# Patient Record
Sex: FEMALE | Race: BLACK OR AFRICAN AMERICAN | NOT HISPANIC OR LATINO
[De-identification: names, ages, dates, MRNs, and addresses within clinical notes are randomized per-mention and may not be internally consistent; named-entity substitution may affect disease eponyms.]

---

## 2017-01-19 ENCOUNTER — APPOINTMENT (OUTPATIENT)
Dept: NEUROLOGY | Facility: CLINIC | Age: 47
End: 2017-01-19

## 2017-01-19 VITALS
WEIGHT: 207 LBS | BODY MASS INDEX: 29.63 KG/M2 | HEIGHT: 70 IN | OXYGEN SATURATION: 100 % | SYSTOLIC BLOOD PRESSURE: 112 MMHG | TEMPERATURE: 98 F | HEART RATE: 72 BPM | DIASTOLIC BLOOD PRESSURE: 76 MMHG

## 2017-01-19 RX ORDER — EPINEPHRINE 0.3 MG/.3ML
0.3 INJECTION INTRAMUSCULAR
Refills: 0 | Status: ACTIVE | COMMUNITY

## 2017-04-17 ENCOUNTER — APPOINTMENT (OUTPATIENT)
Dept: NEUROLOGY | Facility: CLINIC | Age: 47
End: 2017-04-17

## 2017-04-17 DIAGNOSIS — R41.3 OTHER AMNESIA: ICD-10-CM

## 2017-04-19 ENCOUNTER — APPOINTMENT (OUTPATIENT)
Dept: OTOLARYNGOLOGY | Facility: CLINIC | Age: 47
End: 2017-04-19

## 2017-05-09 ENCOUNTER — APPOINTMENT (OUTPATIENT)
Dept: NEUROLOGY | Facility: CLINIC | Age: 47
End: 2017-05-09

## 2017-05-09 VITALS
HEIGHT: 70 IN | HEART RATE: 126 BPM | SYSTOLIC BLOOD PRESSURE: 104 MMHG | DIASTOLIC BLOOD PRESSURE: 65 MMHG | OXYGEN SATURATION: 99 % | BODY MASS INDEX: 28.06 KG/M2 | TEMPERATURE: 98.4 F | WEIGHT: 196 LBS

## 2017-05-09 DIAGNOSIS — R41.0 DISORIENTATION, UNSPECIFIED: ICD-10-CM

## 2017-05-10 ENCOUNTER — APPOINTMENT (OUTPATIENT)
Dept: OTOLARYNGOLOGY | Facility: CLINIC | Age: 47
End: 2017-05-10

## 2017-05-10 VITALS — SYSTOLIC BLOOD PRESSURE: 119 MMHG | HEART RATE: 77 BPM | DIASTOLIC BLOOD PRESSURE: 77 MMHG | OXYGEN SATURATION: 100 %

## 2017-05-10 DIAGNOSIS — H92.09 OTALGIA, UNSPECIFIED EAR: ICD-10-CM

## 2017-05-10 DIAGNOSIS — Z83.52 FAMILY HISTORY OF EAR DISORDERS: ICD-10-CM

## 2017-05-10 DIAGNOSIS — Z84.89 FAMILY HISTORY OF OTHER SPECIFIED CONDITIONS: ICD-10-CM

## 2017-07-12 ENCOUNTER — APPOINTMENT (OUTPATIENT)
Dept: OTOLARYNGOLOGY | Facility: CLINIC | Age: 47
End: 2017-07-12

## 2017-07-12 VITALS
HEART RATE: 113 BPM | SYSTOLIC BLOOD PRESSURE: 134 MMHG | TEMPERATURE: 98.2 F | DIASTOLIC BLOOD PRESSURE: 102 MMHG | OXYGEN SATURATION: 97 %

## 2017-07-24 ENCOUNTER — EMERGENCY (EMERGENCY)
Facility: HOSPITAL | Age: 47
LOS: 1 days | Discharge: HOME | End: 2017-07-24
Attending: EMERGENCY MEDICINE | Admitting: EMERGENCY MEDICINE
Payer: COMMERCIAL

## 2017-07-24 VITALS
OXYGEN SATURATION: 99 % | HEART RATE: 83 BPM | TEMPERATURE: 98 F | WEIGHT: 205.25 LBS | SYSTOLIC BLOOD PRESSURE: 133 MMHG | DIASTOLIC BLOOD PRESSURE: 85 MMHG | RESPIRATION RATE: 18 BRPM

## 2017-07-24 DIAGNOSIS — Z79.899 OTHER LONG TERM (CURRENT) DRUG THERAPY: ICD-10-CM

## 2017-07-24 DIAGNOSIS — Z98.89 OTHER SPECIFIED POSTPROCEDURAL STATES: Chronic | ICD-10-CM

## 2017-07-24 DIAGNOSIS — R10.30 LOWER ABDOMINAL PAIN, UNSPECIFIED: ICD-10-CM

## 2017-07-24 DIAGNOSIS — Z88.8 ALLERGY STATUS TO OTHER DRUGS, MEDICAMENTS AND BIOLOGICAL SUBSTANCES STATUS: ICD-10-CM

## 2017-07-24 DIAGNOSIS — Z79.2 LONG TERM (CURRENT) USE OF ANTIBIOTICS: ICD-10-CM

## 2017-07-24 DIAGNOSIS — M54.5 LOW BACK PAIN: ICD-10-CM

## 2017-07-24 DIAGNOSIS — R19.7 DIARRHEA, UNSPECIFIED: ICD-10-CM

## 2017-07-24 LAB
ALBUMIN SERPL ELPH-MCNC: 4.1 G/DL — SIGNIFICANT CHANGE UP (ref 3.3–5)
ALP SERPL-CCNC: 68 U/L — SIGNIFICANT CHANGE UP (ref 40–120)
ALT FLD-CCNC: 11 U/L — SIGNIFICANT CHANGE UP (ref 10–45)
ANION GAP SERPL CALC-SCNC: 12 MMOL/L — SIGNIFICANT CHANGE UP (ref 5–17)
APPEARANCE UR: CLEAR — SIGNIFICANT CHANGE UP
AST SERPL-CCNC: 14 U/L — SIGNIFICANT CHANGE UP (ref 10–40)
BACTERIA # UR AUTO: PRESENT /HPF
BASOPHILS NFR BLD AUTO: 0.1 % — SIGNIFICANT CHANGE UP (ref 0–2)
BILIRUB SERPL-MCNC: 0.3 MG/DL — SIGNIFICANT CHANGE UP (ref 0.2–1.2)
BILIRUB UR-MCNC: NEGATIVE — SIGNIFICANT CHANGE UP
BUN SERPL-MCNC: 7 MG/DL — SIGNIFICANT CHANGE UP (ref 7–23)
CALCIUM SERPL-MCNC: 9.6 MG/DL — SIGNIFICANT CHANGE UP (ref 8.4–10.5)
CHLORIDE SERPL-SCNC: 102 MMOL/L — SIGNIFICANT CHANGE UP (ref 96–108)
CO2 SERPL-SCNC: 27 MMOL/L — SIGNIFICANT CHANGE UP (ref 22–31)
COLOR SPEC: YELLOW — SIGNIFICANT CHANGE UP
COMMENT - URINE: SIGNIFICANT CHANGE UP
CREAT SERPL-MCNC: 0.7 MG/DL — SIGNIFICANT CHANGE UP (ref 0.5–1.3)
DIFF PNL FLD: (no result)
EOSINOPHIL NFR BLD AUTO: 0.7 % — SIGNIFICANT CHANGE UP (ref 0–6)
EPI CELLS # UR: SIGNIFICANT CHANGE UP /HPF
GLUCOSE SERPL-MCNC: 87 MG/DL — SIGNIFICANT CHANGE UP (ref 70–99)
GLUCOSE UR QL: NEGATIVE — SIGNIFICANT CHANGE UP
HCT VFR BLD CALC: 37.3 % — SIGNIFICANT CHANGE UP (ref 34.5–45)
HGB BLD-MCNC: 12.9 G/DL — SIGNIFICANT CHANGE UP (ref 11.5–15.5)
KETONES UR-MCNC: NEGATIVE — SIGNIFICANT CHANGE UP
LEUKOCYTE ESTERASE UR-ACNC: NEGATIVE — SIGNIFICANT CHANGE UP
LIDOCAIN IGE QN: 22 U/L — SIGNIFICANT CHANGE UP (ref 7–60)
LYMPHOCYTES # BLD AUTO: 38.4 % — SIGNIFICANT CHANGE UP (ref 13–44)
MCHC RBC-ENTMCNC: 27.7 PG — SIGNIFICANT CHANGE UP (ref 27–34)
MCHC RBC-ENTMCNC: 34.6 G/DL — SIGNIFICANT CHANGE UP (ref 32–36)
MCV RBC AUTO: 80.2 FL — SIGNIFICANT CHANGE UP (ref 80–100)
MONOCYTES NFR BLD AUTO: 9 % — SIGNIFICANT CHANGE UP (ref 2–14)
NEUTROPHILS NFR BLD AUTO: 51.8 % — SIGNIFICANT CHANGE UP (ref 43–77)
NITRITE UR-MCNC: NEGATIVE — SIGNIFICANT CHANGE UP
PH UR: 6 — SIGNIFICANT CHANGE UP (ref 5–8)
PLATELET # BLD AUTO: 215 K/UL — SIGNIFICANT CHANGE UP (ref 150–400)
POTASSIUM SERPL-MCNC: 4 MMOL/L — SIGNIFICANT CHANGE UP (ref 3.5–5.3)
POTASSIUM SERPL-SCNC: 4 MMOL/L — SIGNIFICANT CHANGE UP (ref 3.5–5.3)
PROT SERPL-MCNC: 7.6 G/DL — SIGNIFICANT CHANGE UP (ref 6–8.3)
PROT UR-MCNC: NEGATIVE MG/DL — SIGNIFICANT CHANGE UP
RBC # BLD: 4.65 M/UL — SIGNIFICANT CHANGE UP (ref 3.8–5.2)
RBC # FLD: 12.9 % — SIGNIFICANT CHANGE UP (ref 10.3–16.9)
RBC CASTS # UR COMP ASSIST: (no result) /HPF
SODIUM SERPL-SCNC: 141 MMOL/L — SIGNIFICANT CHANGE UP (ref 135–145)
SP GR SPEC: 1.02 — SIGNIFICANT CHANGE UP (ref 1–1.03)
UROBILINOGEN FLD QL: 0.2 E.U./DL — SIGNIFICANT CHANGE UP
WBC # BLD: 6.8 K/UL — SIGNIFICANT CHANGE UP (ref 3.8–10.5)
WBC # FLD AUTO: 6.8 K/UL — SIGNIFICANT CHANGE UP (ref 3.8–10.5)
WBC UR QL: < 5 /HPF — SIGNIFICANT CHANGE UP

## 2017-07-24 PROCEDURE — 99284 EMERGENCY DEPT VISIT MOD MDM: CPT | Mod: 25

## 2017-07-24 PROCEDURE — 80053 COMPREHEN METABOLIC PANEL: CPT

## 2017-07-24 PROCEDURE — 99285 EMERGENCY DEPT VISIT HI MDM: CPT

## 2017-07-24 PROCEDURE — 83690 ASSAY OF LIPASE: CPT

## 2017-07-24 PROCEDURE — 99283 EMERGENCY DEPT VISIT LOW MDM: CPT | Mod: 25

## 2017-07-24 PROCEDURE — 85025 COMPLETE CBC W/AUTO DIFF WBC: CPT

## 2017-07-24 PROCEDURE — 36415 COLL VENOUS BLD VENIPUNCTURE: CPT

## 2017-07-24 PROCEDURE — 74177 CT ABD & PELVIS W/CONTRAST: CPT | Mod: 26

## 2017-07-24 PROCEDURE — 93005 ELECTROCARDIOGRAM TRACING: CPT

## 2017-07-24 PROCEDURE — 74177 CT ABD & PELVIS W/CONTRAST: CPT

## 2017-07-24 PROCEDURE — 96375 TX/PRO/DX INJ NEW DRUG ADDON: CPT | Mod: XU

## 2017-07-24 PROCEDURE — 96374 THER/PROPH/DIAG INJ IV PUSH: CPT | Mod: XU

## 2017-07-24 PROCEDURE — 81001 URINALYSIS AUTO W/SCOPE: CPT

## 2017-07-24 RX ORDER — IOHEXOL 300 MG/ML
50 INJECTION, SOLUTION INTRAVENOUS ONCE
Qty: 0 | Refills: 0 | Status: COMPLETED | OUTPATIENT
Start: 2017-07-24 | End: 2017-07-24

## 2017-07-24 RX ORDER — OXYCODONE AND ACETAMINOPHEN 5; 325 MG/1; MG/1
2 TABLET ORAL ONCE
Qty: 0 | Refills: 0 | Status: DISCONTINUED | OUTPATIENT
Start: 2017-07-24 | End: 2017-07-24

## 2017-07-24 RX ORDER — OXYCODONE AND ACETAMINOPHEN 5; 325 MG/1; MG/1
1 TABLET ORAL ONCE
Qty: 0 | Refills: 0 | Status: DISCONTINUED | OUTPATIENT
Start: 2017-07-24 | End: 2017-07-24

## 2017-07-24 RX ORDER — ONDANSETRON 8 MG/1
4 TABLET, FILM COATED ORAL ONCE
Qty: 0 | Refills: 0 | Status: COMPLETED | OUTPATIENT
Start: 2017-07-24 | End: 2017-07-24

## 2017-07-24 RX ORDER — MORPHINE SULFATE 50 MG/1
6 CAPSULE, EXTENDED RELEASE ORAL ONCE
Qty: 0 | Refills: 0 | Status: DISCONTINUED | OUTPATIENT
Start: 2017-07-24 | End: 2017-07-24

## 2017-07-24 RX ADMIN — IOHEXOL 50 MILLILITER(S): 300 INJECTION, SOLUTION INTRAVENOUS at 17:39

## 2017-07-24 RX ADMIN — MORPHINE SULFATE 6 MILLIGRAM(S): 50 CAPSULE, EXTENDED RELEASE ORAL at 19:27

## 2017-07-24 RX ADMIN — MORPHINE SULFATE 6 MILLIGRAM(S): 50 CAPSULE, EXTENDED RELEASE ORAL at 16:27

## 2017-07-24 RX ADMIN — ONDANSETRON 4 MILLIGRAM(S): 8 TABLET, FILM COATED ORAL at 19:27

## 2017-07-24 RX ADMIN — OXYCODONE AND ACETAMINOPHEN 2 TABLET(S): 5; 325 TABLET ORAL at 23:42

## 2017-07-24 NOTE — ED PROVIDER NOTE - OBJECTIVE STATEMENT
Pt is a 46yo f, h/o djd, herniated cervical disks, hysterectomy for fibroids, gerd, who p/w low back pain and lower abd pain ever since receiving a lumbar facet jt injection on 6/29. Pain sometimes radiates into lower ext, no associated n/t/w. No bowel/ bladder dysfunction. Had diarrhea few days ago, but has had normal bm since then. No vomiting, fever, chills, urinary sx's, flank pain.

## 2017-07-24 NOTE — ED PROVIDER NOTE - PHYSICAL EXAMINATION
VITAL SIGNS: I have reviewed nursing notes and confirm.  CONSTITUTIONAL: Well-developed; well-nourished; in no acute distress.   SKIN:  warm and dry, no acute rash.   HEAD:  normocephalic, atraumatic.  EYES: PERRL, EOM intact; conjunctiva and sclera clear.  ENT: No nasal discharge; airway clear.   NECK: Supple; non tender.  CARD: S1, S2 normal; no murmurs, gallops, or rubs. Regular rate and rhythm.   RESP:  Clear to auscultation b/l, no wheezes, rales or rhonchi.  ABD: Normal bowel sounds; soft; non-distended; + generalized lower abd tenderness, r > l; no guarding/ rebound.  BACK: + ttp to across lower back. No cvat.  EXT: Normal ROM. No clubbing, cyanosis or edema. 2+ pulses to b/l ue/le. + slr rle.   NEURO: Alert, oriented, motor/ sensation grossly unremarkable. Gait steady with walker.  PSYCH: Cooperative, mood and affect appropriate.

## 2017-07-24 NOTE — ED PROVIDER NOTE - PROGRESS NOTE DETAILS
RN reports pt c/o generalized tremor, which she has had intermittently x 2 years, unchanged. ED chart reviewed. Pt awaiting transportation. Pt awake, alert, ROLDAN. No intervention at this time. Repeat VS and FS RN reports pt is now not responding. On exam, pt w/ eyes open and tracking movements of myself / RN. Intermittent blinking. Head tremor. Extremities w/o tremor or convulsive activity. . No tongue. No urination or defecation on self. Pt withdraws from pain. Good tone in all extremities. Pt fails arm drop test b/l (pt does not allow arms to hit her body b/l). Prior inpatient records reviewed - pt had two admissions for Video EEG, both negative for epileptic activity. Pseudoseizure. After several minutes, sx cease spontaneously w/o post-ictal phase, pt reports she was aware of her surroundings. Pt reports she has been suffering w/ these sx for years. Transport arrived. Pt is stable for discharge.

## 2017-07-24 NOTE — ED PROVIDER NOTE - CARE PLAN
Principal Discharge DX:	Abdominal pain, unspecified location  Secondary Diagnosis:	Low back pain, unspecified back pain laterality, unspecified chronicity, with sciatica presence unspecified

## 2017-07-24 NOTE — ED ADULT NURSE NOTE - DISCHARGE TEACHING
Pt instructed if symptoms worsen to return to ED and to f/u with MD in 1-2 days.  Pt verbalized understanding.

## 2017-07-24 NOTE — ED ADULT TRIAGE NOTE - CHIEF COMPLAINT QUOTE
c/o of right lower quadrant pain for a week, reports sharp  pain is getting worst. Reports being treated for cebo. History of back surgery where she had an injection faset injection and lumbar medium branch blocks. No nausea, vomiting, diarrhea.

## 2017-07-24 NOTE — ED PROVIDER NOTE - MEDICAL DECISION MAKING DETAILS
Impression: low back and abd pain ever since receiving facet inj to lumbar spine. Afebrile, hds. Labs reviewed and unremarkable with normal wbc. UA neg for infection, small amt of blood. Do not suspect kidney stone/ pyelo given no cvat and non-colicky appearance. Dissection/ leaking aneurysm unlikely given ongoing constant sx's for almost 4 weeks with no palpable abd masses, normal vs, strong equal pulses b/l. Neuro exam non-focal with no bowel/bladder dysfunction to suggest cauda equina. Will obtain ct a/p to help further differentiate cause of pain. Pt s/o to Dr. Huffman; dispo pending ct results and pt re-assessment.

## 2017-07-24 NOTE — ED ADULT NURSE REASSESSMENT NOTE - NS ED NURSE REASSESS COMMENT FT1
Pt returned from CT. Pt A&Ox3 with no s.s of acute distress at this time Pt states she is experiencing pain of 5:10 at this time in right flank that radiated to right abdomen. Pt states she received Morphine prior to going to CT and it helped the pain but it is maintaining at a 5 at this time. Pt denies dysuria, hematuria or urinary frequency at this time. Pt stable and awaking CT results. MD notified of pt pain at this time. Will continue to monitor.

## 2017-07-25 VITALS
OXYGEN SATURATION: 99 % | RESPIRATION RATE: 17 BRPM | SYSTOLIC BLOOD PRESSURE: 144 MMHG | HEART RATE: 61 BPM | DIASTOLIC BLOOD PRESSURE: 84 MMHG

## 2017-07-25 RX ADMIN — OXYCODONE AND ACETAMINOPHEN 2 TABLET(S): 5; 325 TABLET ORAL at 00:39

## 2017-07-25 NOTE — ED ADULT NURSE REASSESSMENT NOTE - NS ED NURSE REASSESS COMMENT FT1
Pt A&Ox3 OOB with walker with no s.s of acute distress. MD Montesinos cleared pt for safe discharge. Pt denies, numbness or tingling to BL upper and lower extremities, Pt able to hold conversation with no slurred speech.  Pt stable at this and will continue to monitor until pt leaves unit with ambulette

## 2017-07-25 NOTE — ED ADULT NURSE REASSESSMENT NOTE - NS ED NURSE REASSESS COMMENT FT1
Pt await and speaking.  Pt states she has moments in which she feels like she is having tremors and is unable to speak but can hear, and feel everything around her.  Pt states she has been seen by neurologists and ENTs and they told her nothing is wrong.  MD pandey instructed pt has 2 EEGs that are unremarkable. Pt is A&Ox3 with no s.s of acute distress. Pt states "I have been seeing doctors for these symptoms and noone can figure out what it is".  MD Montesinos cleared pt for d/c. Pt stable, pending ambulette for discharge.  Will continue to monitor. Pt awake and speaking.  Pt states she has moments in which she feels like she is having tremors and is unable to speak but can hear, and feel everything around her.  Pt states she has been seen by neurologists and ENTs and they told her nothing is wrong.  MD pandey instructed pt has 2 EEGs that are unremarkable. Pt is A&Ox3 with no s.s of acute distress. Pt states "I have been seeing doctors for these symptoms and noone can figure out what it is".  MD Montesinos cleared pt for d/c. Pt stable, pending ambulette for discharge.  Will continue to monitor.

## 2017-07-25 NOTE — ED ADULT NURSE REASSESSMENT NOTE - NS ED NURSE REASSESS COMMENT FT1
Called over from pt and told "I am having tremors" pt shaking head at this time.  No further shaking noted in body.  PERRLA present. Pt VSS. Pt then became non verbal.  MD Montesinos notified and at bedside to assess. FS done, 114 and in chart. Will continue to monitor.

## 2017-09-14 ENCOUNTER — OTHER (OUTPATIENT)
Age: 47
End: 2017-09-14

## 2018-01-25 ENCOUNTER — APPOINTMENT (OUTPATIENT)
Dept: INTERNAL MEDICINE | Facility: CLINIC | Age: 48
End: 2018-01-25

## 2018-01-30 ENCOUNTER — LABORATORY RESULT (OUTPATIENT)
Age: 48
End: 2018-01-30

## 2018-01-31 ENCOUNTER — APPOINTMENT (OUTPATIENT)
Dept: INTERNAL MEDICINE | Facility: CLINIC | Age: 48
End: 2018-01-31
Payer: MEDICARE

## 2018-01-31 VITALS
SYSTOLIC BLOOD PRESSURE: 126 MMHG | DIASTOLIC BLOOD PRESSURE: 80 MMHG | OXYGEN SATURATION: 98 % | TEMPERATURE: 98.6 F | WEIGHT: 217 LBS | BODY MASS INDEX: 31.07 KG/M2 | HEART RATE: 88 BPM | HEIGHT: 70 IN

## 2018-01-31 DIAGNOSIS — F44.9 DISSOCIATIVE AND CONVERSION DISORDER, UNSPECIFIED: ICD-10-CM

## 2018-01-31 PROCEDURE — 36415 COLL VENOUS BLD VENIPUNCTURE: CPT

## 2018-01-31 PROCEDURE — 99205 OFFICE O/P NEW HI 60 MIN: CPT | Mod: 25

## 2018-02-01 PROBLEM — F44.9 CONVERSION DISORDER: Status: ACTIVE | Noted: 2017-05-09

## 2018-02-05 ENCOUNTER — APPOINTMENT (OUTPATIENT)
Dept: HEART AND VASCULAR | Facility: CLINIC | Age: 48
End: 2018-02-05
Payer: MEDICARE

## 2018-02-05 ENCOUNTER — NON-APPOINTMENT (OUTPATIENT)
Age: 48
End: 2018-02-05

## 2018-02-05 VITALS
DIASTOLIC BLOOD PRESSURE: 76 MMHG | BODY MASS INDEX: 30.78 KG/M2 | WEIGHT: 215 LBS | TEMPERATURE: 98.2 F | HEART RATE: 79 BPM | HEIGHT: 70 IN | SYSTOLIC BLOOD PRESSURE: 124 MMHG | OXYGEN SATURATION: 98 %

## 2018-02-05 DIAGNOSIS — R06.09 OTHER FORMS OF DYSPNEA: ICD-10-CM

## 2018-02-05 PROCEDURE — 93000 ELECTROCARDIOGRAM COMPLETE: CPT

## 2018-02-05 PROCEDURE — 99203 OFFICE O/P NEW LOW 30 MIN: CPT | Mod: 25

## 2018-02-08 LAB
ALBUMIN SERPL ELPH-MCNC: 4 G/DL
ALP BLD-CCNC: 66 U/L
ALT SERPL-CCNC: 14 U/L
ANION GAP SERPL CALC-SCNC: 12 MMOL/L
AST SERPL-CCNC: 13 U/L
BASOPHILS # BLD AUTO: 0.02 K/UL
BASOPHILS NFR BLD AUTO: 0.3 %
BILIRUB SERPL-MCNC: 0.5 MG/DL
BUN SERPL-MCNC: 4 MG/DL
CALCIUM SERPL-MCNC: 9.6 MG/DL
CHLORIDE SERPL-SCNC: 101 MMOL/L
CO2 SERPL-SCNC: 28 MMOL/L
CREAT SERPL-MCNC: 0.71 MG/DL
EOSINOPHIL # BLD AUTO: 0.1 K/UL
EOSINOPHIL NFR BLD AUTO: 1.6 %
GLUCOSE SERPL-MCNC: 90 MG/DL
HCT VFR BLD CALC: 40.2 %
HGB BLD-MCNC: 13.2 G/DL
IMM GRANULOCYTES NFR BLD AUTO: 0 %
LYMPHOCYTES # BLD AUTO: 2.18 K/UL
LYMPHOCYTES NFR BLD AUTO: 34.4 %
MAN DIFF?: NORMAL
MCHC RBC-ENTMCNC: 27.7 PG
MCHC RBC-ENTMCNC: 32.8 GM/DL
MCV RBC AUTO: 84.5 FL
MONOCYTES # BLD AUTO: 0.71 K/UL
MONOCYTES NFR BLD AUTO: 11.2 %
NEUTROPHILS # BLD AUTO: 3.32 K/UL
NEUTROPHILS NFR BLD AUTO: 52.5 %
PLATELET # BLD AUTO: 261 K/UL
POTASSIUM SERPL-SCNC: 4.5 MMOL/L
PROT SERPL-MCNC: 7.2 G/DL
RBC # BLD: 4.76 M/UL
RBC # FLD: 14 %
SODIUM SERPL-SCNC: 141 MMOL/L
WBC # FLD AUTO: 6.33 K/UL

## 2018-02-12 ENCOUNTER — FORM ENCOUNTER (OUTPATIENT)
Age: 48
End: 2018-02-12

## 2018-02-12 ENCOUNTER — OTHER (OUTPATIENT)
Age: 48
End: 2018-02-12

## 2018-02-13 ENCOUNTER — OUTPATIENT (OUTPATIENT)
Dept: OUTPATIENT SERVICES | Facility: HOSPITAL | Age: 48
LOS: 1 days | End: 2018-02-13
Payer: MEDICARE

## 2018-02-13 DIAGNOSIS — R06.09 OTHER FORMS OF DYSPNEA: ICD-10-CM

## 2018-02-13 DIAGNOSIS — Z98.89 OTHER SPECIFIED POSTPROCEDURAL STATES: Chronic | ICD-10-CM

## 2018-02-13 PROCEDURE — 78452 HT MUSCLE IMAGE SPECT MULT: CPT | Mod: 26

## 2018-02-13 PROCEDURE — A9500: CPT

## 2018-02-13 PROCEDURE — 93017 CV STRESS TEST TRACING ONLY: CPT

## 2018-02-13 PROCEDURE — A9505: CPT

## 2018-02-13 PROCEDURE — 93018 CV STRESS TEST I&R ONLY: CPT

## 2018-02-13 PROCEDURE — 78452 HT MUSCLE IMAGE SPECT MULT: CPT

## 2018-02-13 PROCEDURE — 93306 TTE W/DOPPLER COMPLETE: CPT

## 2018-02-13 PROCEDURE — 93016 CV STRESS TEST SUPVJ ONLY: CPT

## 2018-02-13 PROCEDURE — 93306 TTE W/DOPPLER COMPLETE: CPT | Mod: 26

## 2018-02-15 ENCOUNTER — APPOINTMENT (OUTPATIENT)
Dept: HEART AND VASCULAR | Facility: CLINIC | Age: 48
End: 2018-02-15

## 2018-02-19 ENCOUNTER — FORM ENCOUNTER (OUTPATIENT)
Age: 48
End: 2018-02-19

## 2018-02-20 ENCOUNTER — APPOINTMENT (OUTPATIENT)
Dept: ORTHOPEDIC SURGERY | Facility: CLINIC | Age: 48
End: 2018-02-20
Payer: MEDICARE

## 2018-02-20 ENCOUNTER — OUTPATIENT (OUTPATIENT)
Dept: OUTPATIENT SERVICES | Facility: HOSPITAL | Age: 48
LOS: 1 days | End: 2018-02-20
Payer: MEDICARE

## 2018-02-20 VITALS
WEIGHT: 215 LBS | BODY MASS INDEX: 30.78 KG/M2 | HEIGHT: 70 IN | SYSTOLIC BLOOD PRESSURE: 120 MMHG | DIASTOLIC BLOOD PRESSURE: 80 MMHG

## 2018-02-20 DIAGNOSIS — M51.9 UNSPECIFIED THORACIC, THORACOLUMBAR AND LUMBOSACRAL INTERVERTEBRAL DISC DISORDER: ICD-10-CM

## 2018-02-20 DIAGNOSIS — Z98.89 OTHER SPECIFIED POSTPROCEDURAL STATES: Chronic | ICD-10-CM

## 2018-02-20 DIAGNOSIS — Z86.79 PERSONAL HISTORY OF OTHER DISEASES OF THE CIRCULATORY SYSTEM: ICD-10-CM

## 2018-02-20 PROCEDURE — 72082 X-RAY EXAM ENTIRE SPI 2/3 VW: CPT

## 2018-02-20 PROCEDURE — 72100 X-RAY EXAM L-S SPINE 2/3 VWS: CPT | Mod: 26

## 2018-02-20 PROCEDURE — 72080 X-RAY EXAM THORACOLMB 2/> VW: CPT | Mod: 26,59

## 2018-02-20 PROCEDURE — 99204 OFFICE O/P NEW MOD 45 MIN: CPT

## 2018-02-20 PROCEDURE — 72050 X-RAY EXAM NECK SPINE 4/5VWS: CPT

## 2018-02-20 PROCEDURE — 72084 X-RAY EXAM ENTIRE SPI 6/> VW: CPT

## 2018-02-20 PROCEDURE — 72050 X-RAY EXAM NECK SPINE 4/5VWS: CPT | Mod: 26

## 2018-02-20 PROCEDURE — 72100 X-RAY EXAM L-S SPINE 2/3 VWS: CPT

## 2018-03-14 PROBLEM — M51.9 LUMBAR DISC DISEASE: Status: ACTIVE | Noted: 2018-03-14

## 2018-04-10 ENCOUNTER — RESULT CHARGE (OUTPATIENT)
Age: 48
End: 2018-04-10

## 2018-04-11 ENCOUNTER — OTHER (OUTPATIENT)
Age: 48
End: 2018-04-11

## 2018-07-26 ENCOUNTER — APPOINTMENT (OUTPATIENT)
Dept: INTERNAL MEDICINE | Facility: CLINIC | Age: 48
End: 2018-07-26
Payer: MEDICARE

## 2018-07-26 VITALS
SYSTOLIC BLOOD PRESSURE: 101 MMHG | OXYGEN SATURATION: 97 % | HEART RATE: 80 BPM | HEIGHT: 70 IN | BODY MASS INDEX: 30.78 KG/M2 | WEIGHT: 215 LBS | TEMPERATURE: 99.2 F | DIASTOLIC BLOOD PRESSURE: 67 MMHG

## 2018-07-26 PROCEDURE — 99215 OFFICE O/P EST HI 40 MIN: CPT

## 2018-09-26 ENCOUNTER — APPOINTMENT (OUTPATIENT)
Dept: INTERNAL MEDICINE | Facility: CLINIC | Age: 48
End: 2018-09-26
Payer: MEDICARE

## 2018-09-26 VITALS
HEIGHT: 69.29 IN | OXYGEN SATURATION: 98 % | TEMPERATURE: 98.9 F | DIASTOLIC BLOOD PRESSURE: 90 MMHG | WEIGHT: 211 LBS | BODY MASS INDEX: 30.9 KG/M2 | SYSTOLIC BLOOD PRESSURE: 135 MMHG | HEART RATE: 97 BPM

## 2018-09-26 DIAGNOSIS — R50.9 FEVER, UNSPECIFIED: ICD-10-CM

## 2018-09-26 DIAGNOSIS — R25.9 UNSPECIFIED ABNORMAL INVOLUNTARY MOVEMENTS: ICD-10-CM

## 2018-09-26 DIAGNOSIS — Z12.11 ENCOUNTER FOR SCREENING FOR MALIGNANT NEOPLASM OF COLON: ICD-10-CM

## 2018-09-26 PROCEDURE — 36415 COLL VENOUS BLD VENIPUNCTURE: CPT

## 2018-09-26 PROCEDURE — 99214 OFFICE O/P EST MOD 30 MIN: CPT | Mod: 25

## 2018-10-03 LAB
ALBUMIN SERPL ELPH-MCNC: 4.4 G/DL
ALP BLD-CCNC: 78 U/L
ALT SERPL-CCNC: 9 U/L
ANION GAP SERPL CALC-SCNC: 18 MMOL/L
APPEARANCE: CLEAR
AST SERPL-CCNC: 11 U/L
BASOPHILS # BLD AUTO: 0.02 K/UL
BASOPHILS NFR BLD AUTO: 0.4 %
BILIRUB SERPL-MCNC: 0.4 MG/DL
BILIRUBIN URINE: NEGATIVE
BLOOD URINE: NEGATIVE
BUN SERPL-MCNC: 7 MG/DL
CALCIUM SERPL-MCNC: 9.3 MG/DL
CHLORIDE SERPL-SCNC: 102 MMOL/L
CO2 SERPL-SCNC: 22 MMOL/L
COLOR: YELLOW
CREAT SERPL-MCNC: 0.73 MG/DL
EOSINOPHIL # BLD AUTO: 0.09 K/UL
EOSINOPHIL NFR BLD AUTO: 1.7 %
GLUCOSE QUALITATIVE U: NEGATIVE MG/DL
GLUCOSE SERPL-MCNC: 95 MG/DL
HCT VFR BLD CALC: 39.9 %
HGB BLD-MCNC: 12.4 G/DL
HOMOCYSTEINE LEVEL: 8.1 UMOL/L
IMM GRANULOCYTES NFR BLD AUTO: 0.2 %
KETONES URINE: NEGATIVE
LEUKOCYTE ESTERASE URINE: NEGATIVE
LYMPHOCYTES # BLD AUTO: 1.89 K/UL
LYMPHOCYTES NFR BLD AUTO: 34.7 %
MAN DIFF?: NORMAL
MCHC RBC-ENTMCNC: 26.4 PG
MCHC RBC-ENTMCNC: 31.1 GM/DL
MCV RBC AUTO: 84.9 FL
METHYLMALONIC ACID LEVEL: 157 NMOL/L
MONOCYTES # BLD AUTO: 0.45 K/UL
MONOCYTES NFR BLD AUTO: 8.3 %
NEUTROPHILS # BLD AUTO: 2.99 K/UL
NEUTROPHILS NFR BLD AUTO: 54.7 %
NITRITE URINE: NEGATIVE
PH URINE: 6
PLATELET # BLD AUTO: 272 K/UL
POTASSIUM SERPL-SCNC: 4.2 MMOL/L
PROT SERPL-MCNC: 7.4 G/DL
PROTEIN URINE: NEGATIVE MG/DL
RBC # BLD: 4.7 M/UL
RBC # FLD: 14.2 %
SODIUM SERPL-SCNC: 141 MMOL/L
SPECIFIC GRAVITY URINE: 1.02
UROBILINOGEN URINE: 1 MG/DL
VIT B12 SERPL-MCNC: 283 PG/ML
VIT B6 SERPL-MCNC: 4.5 UG/L
WBC # FLD AUTO: 5.45 K/UL

## 2018-12-12 ENCOUNTER — APPOINTMENT (OUTPATIENT)
Dept: INTERNAL MEDICINE | Facility: CLINIC | Age: 48
End: 2018-12-12
Payer: MEDICARE

## 2018-12-12 VITALS
WEIGHT: 223 LBS | HEART RATE: 84 BPM | DIASTOLIC BLOOD PRESSURE: 72 MMHG | TEMPERATURE: 97.9 F | BODY MASS INDEX: 32.66 KG/M2 | OXYGEN SATURATION: 99 % | SYSTOLIC BLOOD PRESSURE: 117 MMHG

## 2018-12-12 PROCEDURE — 99213 OFFICE O/P EST LOW 20 MIN: CPT

## 2019-01-15 ENCOUNTER — APPOINTMENT (OUTPATIENT)
Dept: OBGYN | Facility: CLINIC | Age: 49
End: 2019-01-15
Payer: MEDICARE

## 2019-01-15 ENCOUNTER — LABORATORY RESULT (OUTPATIENT)
Age: 49
End: 2019-01-15

## 2019-01-15 VITALS
HEIGHT: 69.29 IN | SYSTOLIC BLOOD PRESSURE: 130 MMHG | WEIGHT: 232 LBS | BODY MASS INDEX: 33.97 KG/M2 | DIASTOLIC BLOOD PRESSURE: 90 MMHG

## 2019-01-15 DIAGNOSIS — Z01.419 ENCOUNTER FOR GYNECOLOGICAL EXAMINATION (GENERAL) (ROUTINE) W/OUT ABNORMAL FINDINGS: ICD-10-CM

## 2019-01-15 PROCEDURE — G0101: CPT

## 2019-01-15 RX ORDER — CLINDAMYCIN PHOSPHATE 10 MG/ML
1 LOTION TOPICAL
Refills: 0 | Status: COMPLETED | COMMUNITY
End: 2019-01-15

## 2019-01-15 RX ORDER — PREDNISOLONE ACETATE 10 MG/ML
1 SUSPENSION/ DROPS OPHTHALMIC
Refills: 0 | Status: COMPLETED | COMMUNITY
End: 2019-01-15

## 2019-01-15 NOTE — HISTORY OF PRESENT ILLNESS
[Definite:  ___ (Date)] : the last menstrual period was [unfilled] [Post-Menopause, No Sxs] : post-menopausal, currently without symptoms [___ Year(s) Ago] : [unfilled] year(s) ago [Good] : being in good health [Healthy Diet] : a healthy diet [Regular Exercise] : regular exercise [Last Mammogram ___] : Last Mammogram was [unfilled] [Last Colonoscopy ___] : Last colonoscopy [unfilled] [Postmenopausal] : is postmenopausal [Weight Concerns] : no concerns with her weight [Sexually Active] : is not sexually active

## 2019-01-15 NOTE — PHYSICAL EXAM
[Awake] : awake [Alert] : alert [Soft] : soft [Oriented x3] : oriented to person, place, and time [Normal] : vagina [No Bleeding] : there was no active vaginal bleeding [Absent] : absent [Uterine Adnexae] : were not tender and not enlarged [Acute Distress] : no acute distress [Mass] : no breast mass [Nipple Discharge] : no nipple discharge [Axillary LAD] : no axillary lymphadenopathy [Tender] : non tender [Adnexa Tenderness] : were not tender [Ovarian Mass (___ Cm)] : there were no adnexal masses

## 2019-01-16 LAB
HBV SURFACE AG SER QL: NONREACTIVE
HCV AB SER QL: NONREACTIVE
HCV S/CO RATIO: 0.16 S/CO
HIV1+2 AB SPEC QL IA.RAPID: NONREACTIVE
HPV HIGH+LOW RISK DNA PNL CVX: NOT DETECTED
SOURCE AMPLIFICATION: NORMAL
T PALLIDUM AB SER QL IA: NEGATIVE
T VAGINALIS RRNA SPEC QL NAA+PROBE: NOT DETECTED

## 2019-01-22 LAB — CYTOLOGY CVX/VAG DOC THIN PREP: NORMAL

## 2019-01-30 ENCOUNTER — APPOINTMENT (OUTPATIENT)
Dept: INTERNAL MEDICINE | Facility: CLINIC | Age: 49
End: 2019-01-30
Payer: MEDICARE

## 2019-01-30 VITALS
DIASTOLIC BLOOD PRESSURE: 78 MMHG | SYSTOLIC BLOOD PRESSURE: 126 MMHG | TEMPERATURE: 98.3 F | OXYGEN SATURATION: 96 % | HEART RATE: 78 BPM | BODY MASS INDEX: 33.24 KG/M2 | WEIGHT: 227 LBS

## 2019-01-30 DIAGNOSIS — Z13.31 ENCOUNTER FOR SCREENING FOR DEPRESSION: ICD-10-CM

## 2019-01-30 PROCEDURE — 36415 COLL VENOUS BLD VENIPUNCTURE: CPT

## 2019-01-30 PROCEDURE — G0444 DEPRESSION SCREEN ANNUAL: CPT | Mod: 59

## 2019-01-30 PROCEDURE — 99215 OFFICE O/P EST HI 40 MIN: CPT | Mod: 25

## 2019-01-30 RX ORDER — FLUTICASONE PROPIONATE 50 UG/1
50 SPRAY, METERED NASAL DAILY
Qty: 1 | Refills: 0 | Status: DISCONTINUED | COMMUNITY
End: 2019-01-30

## 2019-01-30 RX ORDER — SIMETHICONE 125 MG
125 CAPSULE ORAL
Refills: 0 | Status: DISCONTINUED | COMMUNITY
Start: 2019-01-15 | End: 2019-01-30

## 2019-01-30 RX ORDER — DOCUSATE SODIUM 100 MG
100 TABLET ORAL DAILY
Refills: 0 | Status: DISCONTINUED | COMMUNITY
End: 2019-01-30

## 2019-01-31 ENCOUNTER — RESULT REVIEW (OUTPATIENT)
Age: 49
End: 2019-01-31

## 2019-02-07 LAB
25(OH)D3 SERPL-MCNC: 15.6 NG/ML
ALBUMIN SERPL ELPH-MCNC: 4.1 G/DL
ALP BLD-CCNC: 61 U/L
ALT SERPL-CCNC: 6 U/L
ANION GAP SERPL CALC-SCNC: 11 MMOL/L
AST SERPL-CCNC: 20 U/L
BASOPHILS # BLD AUTO: 0.03 K/UL
BASOPHILS NFR BLD AUTO: 0.6 %
BILIRUB SERPL-MCNC: 0.5 MG/DL
BUN SERPL-MCNC: 8 MG/DL
CALCIUM SERPL-MCNC: 9.8 MG/DL
CHLORIDE SERPL-SCNC: 103 MMOL/L
CHOLEST SERPL-MCNC: 155 MG/DL
CHOLEST/HDLC SERPL: 2.2 RATIO
CO2 SERPL-SCNC: 26 MMOL/L
CREAT SERPL-MCNC: 0.67 MG/DL
EOSINOPHIL # BLD AUTO: 0.11 K/UL
EOSINOPHIL NFR BLD AUTO: 2.4 %
GLUCOSE SERPL-MCNC: 71 MG/DL
HBA1C MFR BLD HPLC: 5.4 %
HCT VFR BLD CALC: 39.7 %
HDLC SERPL-MCNC: 69 MG/DL
HGB BLD-MCNC: 11.7 G/DL
HOMOCYSTEINE LEVEL: 8.3 UMOL/L
IGA SER QL IEP: 248 MG/DL
IMM GRANULOCYTES NFR BLD AUTO: 0 %
LDLC SERPL CALC-MCNC: 78 MG/DL
LYMPHOCYTES # BLD AUTO: 1.83 K/UL
LYMPHOCYTES NFR BLD AUTO: 39.4 %
MAN DIFF?: NORMAL
MCHC RBC-ENTMCNC: 26.6 PG
MCHC RBC-ENTMCNC: 29.5 GM/DL
MCV RBC AUTO: 90.2 FL
METHYLMALONIC ACID LEVEL: 151 NMOL/L
MONOCYTES # BLD AUTO: 0.69 K/UL
MONOCYTES NFR BLD AUTO: 14.9 %
NEUTROPHILS # BLD AUTO: 1.98 K/UL
NEUTROPHILS NFR BLD AUTO: 42.7 %
PLATELET # BLD AUTO: 244 K/UL
POTASSIUM SERPL-SCNC: 4.1 MMOL/L
PROT SERPL-MCNC: 7.1 G/DL
RBC # BLD: 4.4 M/UL
RBC # FLD: 14.8 %
SODIUM SERPL-SCNC: 140 MMOL/L
TRIGL SERPL-MCNC: 42 MG/DL
TSH SERPL-ACNC: 1.08 UIU/ML
TTG IGA SER IA-ACNC: 5.5 UNITS
TTG IGA SER-ACNC: NEGATIVE
TTG IGG SER IA-ACNC: <5 UNITS
TTG IGG SER IA-ACNC: NEGATIVE
VIT B12 SERPL-MCNC: 299 PG/ML
WBC # FLD AUTO: 4.64 K/UL

## 2019-03-04 PROBLEM — R41.3 AMNESIA: Status: ACTIVE | Noted: 2017-04-19

## 2019-04-10 ENCOUNTER — APPOINTMENT (OUTPATIENT)
Dept: INTERNAL MEDICINE | Facility: CLINIC | Age: 49
End: 2019-04-10
Payer: MEDICARE

## 2019-04-10 VITALS
SYSTOLIC BLOOD PRESSURE: 112 MMHG | OXYGEN SATURATION: 100 % | BODY MASS INDEX: 34.31 KG/M2 | WEIGHT: 237 LBS | HEART RATE: 108 BPM | DIASTOLIC BLOOD PRESSURE: 74 MMHG | TEMPERATURE: 98.2 F | HEIGHT: 69.5 IN

## 2019-04-10 PROCEDURE — 99495 TRANSJ CARE MGMT MOD F2F 14D: CPT

## 2019-04-10 RX ORDER — PANTOPRAZOLE 40 MG/1
40 TABLET, DELAYED RELEASE ORAL
Qty: 90 | Refills: 0 | Status: DISCONTINUED | COMMUNITY
End: 2019-04-10

## 2019-06-13 ENCOUNTER — MEDICATION RENEWAL (OUTPATIENT)
Age: 49
End: 2019-06-13

## 2019-08-29 ENCOUNTER — APPOINTMENT (OUTPATIENT)
Dept: INTERNAL MEDICINE | Facility: CLINIC | Age: 49
End: 2019-08-29
Payer: MEDICARE

## 2019-08-29 VITALS
SYSTOLIC BLOOD PRESSURE: 106 MMHG | OXYGEN SATURATION: 100 % | TEMPERATURE: 98.1 F | BODY MASS INDEX: 32.58 KG/M2 | WEIGHT: 225 LBS | DIASTOLIC BLOOD PRESSURE: 69 MMHG | HEIGHT: 69.5 IN | HEART RATE: 83 BPM

## 2019-08-29 DIAGNOSIS — R53.1 WEAKNESS: ICD-10-CM

## 2019-08-29 DIAGNOSIS — R07.89 OTHER CHEST PAIN: ICD-10-CM

## 2019-08-29 DIAGNOSIS — Z87.898 PERSONAL HISTORY OF OTHER SPECIFIED CONDITIONS: ICD-10-CM

## 2019-08-29 PROCEDURE — 99214 OFFICE O/P EST MOD 30 MIN: CPT | Mod: 25

## 2019-08-29 PROCEDURE — 36415 COLL VENOUS BLD VENIPUNCTURE: CPT

## 2019-08-31 LAB
ALBUMIN SERPL ELPH-MCNC: 4.7 G/DL
ALP BLD-CCNC: 82 U/L
ALT SERPL-CCNC: 7 U/L
ANION GAP SERPL CALC-SCNC: 29 MMOL/L
AST SERPL-CCNC: 16 U/L
BASOPHILS # BLD AUTO: 0.04 K/UL
BASOPHILS NFR BLD AUTO: 0.7 %
BILIRUB SERPL-MCNC: 0.2 MG/DL
BUN SERPL-MCNC: 9 MG/DL
CALCIUM SERPL-MCNC: 9.7 MG/DL
CHLORIDE SERPL-SCNC: 106 MMOL/L
CO2 SERPL-SCNC: 16 MMOL/L
CREAT SERPL-MCNC: 0.68 MG/DL
EOSINOPHIL # BLD AUTO: 0.05 K/UL
EOSINOPHIL NFR BLD AUTO: 0.9 %
GLUCOSE SERPL-MCNC: 86 MG/DL
HCT VFR BLD CALC: 40.3 %
HGB BLD-MCNC: 12.7 G/DL
IMM GRANULOCYTES NFR BLD AUTO: 0.2 %
LYMPHOCYTES # BLD AUTO: 2.2 K/UL
LYMPHOCYTES NFR BLD AUTO: 37.8 %
MAN DIFF?: NORMAL
MCHC RBC-ENTMCNC: 27 PG
MCHC RBC-ENTMCNC: 31.5 GM/DL
MCV RBC AUTO: 85.7 FL
MONOCYTES # BLD AUTO: 0.6 K/UL
MONOCYTES NFR BLD AUTO: 10.3 %
NEUTROPHILS # BLD AUTO: 2.92 K/UL
NEUTROPHILS NFR BLD AUTO: 50.1 %
PLATELET # BLD AUTO: 270 K/UL
POTASSIUM SERPL-SCNC: 4.4 MMOL/L
PROT SERPL-MCNC: 8.3 G/DL
RBC # BLD: 4.7 M/UL
RBC # FLD: 14.4 %
SODIUM SERPL-SCNC: 151 MMOL/L
VIT B12 SERPL-MCNC: 950 PG/ML
WBC # FLD AUTO: 5.82 K/UL

## 2019-09-03 DIAGNOSIS — E87.0 HYPEROSMOLALITY AND HYPERNATREMIA: ICD-10-CM

## 2019-09-05 ENCOUNTER — APPOINTMENT (OUTPATIENT)
Dept: INTERNAL MEDICINE | Facility: CLINIC | Age: 49
End: 2019-09-05
Payer: MEDICARE

## 2019-09-05 PROCEDURE — 36415 COLL VENOUS BLD VENIPUNCTURE: CPT

## 2019-09-06 LAB
ANION GAP SERPL CALC-SCNC: 13 MMOL/L
BUN SERPL-MCNC: 7 MG/DL
CALCIUM SERPL-MCNC: 9.4 MG/DL
CHLORIDE SERPL-SCNC: 99 MMOL/L
CO2 SERPL-SCNC: 25 MMOL/L
CREAT SERPL-MCNC: 0.64 MG/DL
GLUCOSE SERPL-MCNC: 86 MG/DL
POTASSIUM SERPL-SCNC: 4 MMOL/L
SODIUM SERPL-SCNC: 137 MMOL/L

## 2019-09-09 ENCOUNTER — LABORATORY RESULT (OUTPATIENT)
Age: 49
End: 2019-09-09

## 2019-09-09 ENCOUNTER — APPOINTMENT (OUTPATIENT)
Dept: DERMATOLOGY | Facility: CLINIC | Age: 49
End: 2019-09-09
Payer: MEDICARE

## 2019-09-09 VITALS — SYSTOLIC BLOOD PRESSURE: 117 MMHG | DIASTOLIC BLOOD PRESSURE: 65 MMHG

## 2019-09-09 DIAGNOSIS — Z91.89 OTHER SPECIFIED PERSONAL RISK FACTORS, NOT ELSEWHERE CLASSIFIED: ICD-10-CM

## 2019-09-09 DIAGNOSIS — R23.4 CHANGES IN SKIN TEXTURE: ICD-10-CM

## 2019-09-09 PROCEDURE — 99203 OFFICE O/P NEW LOW 30 MIN: CPT | Mod: 25

## 2019-09-09 PROCEDURE — 11104 PUNCH BX SKIN SINGLE LESION: CPT

## 2019-09-25 ENCOUNTER — LABORATORY RESULT (OUTPATIENT)
Age: 49
End: 2019-09-25

## 2019-09-25 ENCOUNTER — APPOINTMENT (OUTPATIENT)
Dept: DERMATOLOGY | Facility: CLINIC | Age: 49
End: 2019-09-25
Payer: MEDICARE

## 2019-09-25 DIAGNOSIS — D48.7 NEOPLASM OF UNCERTAIN BEHAVIOR OF OTHER SPECIFIED SITES: ICD-10-CM

## 2019-09-25 DIAGNOSIS — L30.9 DERMATITIS, UNSPECIFIED: ICD-10-CM

## 2019-09-25 PROCEDURE — 11401 EXC TR-EXT B9+MARG 0.6-1 CM: CPT

## 2019-09-25 PROCEDURE — 99214 OFFICE O/P EST MOD 30 MIN: CPT | Mod: 25

## 2019-09-25 PROCEDURE — 12031 INTMD RPR S/A/T/EXT 2.5 CM/<: CPT

## 2019-10-02 ENCOUNTER — APPOINTMENT (OUTPATIENT)
Dept: DERMATOLOGY | Facility: CLINIC | Age: 49
End: 2019-10-02

## 2019-10-10 ENCOUNTER — APPOINTMENT (OUTPATIENT)
Dept: DERMATOLOGY | Facility: CLINIC | Age: 49
End: 2019-10-10
Payer: MEDICARE

## 2019-10-10 DIAGNOSIS — D17.30 BENIGN LIPOMATOUS NEOPLASM OF SKIN AND SUBCUTANEOUS TISSUE OF UNSPECIFIED SITES: ICD-10-CM

## 2019-10-10 PROCEDURE — 99213 OFFICE O/P EST LOW 20 MIN: CPT

## 2019-10-15 ENCOUNTER — APPOINTMENT (OUTPATIENT)
Dept: DERMATOLOGY | Facility: CLINIC | Age: 49
End: 2019-10-15

## 2020-03-25 ENCOUNTER — APPOINTMENT (OUTPATIENT)
Dept: INTERNAL MEDICINE | Facility: CLINIC | Age: 50
End: 2020-03-25
Payer: MEDICARE

## 2020-03-25 DIAGNOSIS — R05 COUGH: ICD-10-CM

## 2020-03-25 PROCEDURE — 99441: CPT

## 2020-05-18 ENCOUNTER — APPOINTMENT (OUTPATIENT)
Dept: INTERNAL MEDICINE | Facility: CLINIC | Age: 50
End: 2020-05-18
Payer: MEDICARE

## 2020-05-18 ENCOUNTER — EMERGENCY (EMERGENCY)
Facility: HOSPITAL | Age: 50
LOS: 1 days | Discharge: HOME | End: 2020-05-18
Attending: EMERGENCY MEDICINE | Admitting: EMERGENCY MEDICINE
Payer: MEDICARE

## 2020-05-18 VITALS
WEIGHT: 190.04 LBS | HEART RATE: 74 BPM | RESPIRATION RATE: 17 BRPM | SYSTOLIC BLOOD PRESSURE: 126 MMHG | TEMPERATURE: 98 F | OXYGEN SATURATION: 98 % | DIASTOLIC BLOOD PRESSURE: 65 MMHG

## 2020-05-18 VITALS
OXYGEN SATURATION: 100 % | RESPIRATION RATE: 16 BRPM | HEART RATE: 59 BPM | TEMPERATURE: 98 F | SYSTOLIC BLOOD PRESSURE: 119 MMHG | DIASTOLIC BLOOD PRESSURE: 83 MMHG

## 2020-05-18 DIAGNOSIS — Z98.89 OTHER SPECIFIED POSTPROCEDURAL STATES: Chronic | ICD-10-CM

## 2020-05-18 DIAGNOSIS — M79.89 OTHER SPECIFIED SOFT TISSUE DISORDERS: ICD-10-CM

## 2020-05-18 LAB
ANION GAP SERPL CALC-SCNC: 10 MMOL/L — SIGNIFICANT CHANGE UP (ref 5–17)
BASOPHILS # BLD AUTO: 0.03 K/UL — SIGNIFICANT CHANGE UP (ref 0–0.2)
BASOPHILS NFR BLD AUTO: 0.5 % — SIGNIFICANT CHANGE UP (ref 0–2)
BUN SERPL-MCNC: 10 MG/DL — SIGNIFICANT CHANGE UP (ref 7–23)
CALCIUM SERPL-MCNC: 9.5 MG/DL — SIGNIFICANT CHANGE UP (ref 8.4–10.5)
CHLORIDE SERPL-SCNC: 104 MMOL/L — SIGNIFICANT CHANGE UP (ref 96–108)
CO2 SERPL-SCNC: 26 MMOL/L — SIGNIFICANT CHANGE UP (ref 22–31)
CREAT SERPL-MCNC: 0.62 MG/DL — SIGNIFICANT CHANGE UP (ref 0.5–1.3)
EOSINOPHIL # BLD AUTO: 0.11 K/UL — SIGNIFICANT CHANGE UP (ref 0–0.5)
EOSINOPHIL NFR BLD AUTO: 1.7 % — SIGNIFICANT CHANGE UP (ref 0–6)
GLUCOSE SERPL-MCNC: 100 MG/DL — HIGH (ref 70–99)
HCT VFR BLD CALC: 35.5 % — SIGNIFICANT CHANGE UP (ref 34.5–45)
HGB BLD-MCNC: 11.6 G/DL — SIGNIFICANT CHANGE UP (ref 11.5–15.5)
IMM GRANULOCYTES NFR BLD AUTO: 0.3 % — SIGNIFICANT CHANGE UP (ref 0–1.5)
LYMPHOCYTES # BLD AUTO: 2.25 K/UL — SIGNIFICANT CHANGE UP (ref 1–3.3)
LYMPHOCYTES # BLD AUTO: 35.3 % — SIGNIFICANT CHANGE UP (ref 13–44)
MCHC RBC-ENTMCNC: 27.8 PG — SIGNIFICANT CHANGE UP (ref 27–34)
MCHC RBC-ENTMCNC: 32.7 GM/DL — SIGNIFICANT CHANGE UP (ref 32–36)
MCV RBC AUTO: 84.9 FL — SIGNIFICANT CHANGE UP (ref 80–100)
MONOCYTES # BLD AUTO: 0.82 K/UL — SIGNIFICANT CHANGE UP (ref 0–0.9)
MONOCYTES NFR BLD AUTO: 12.9 % — SIGNIFICANT CHANGE UP (ref 2–14)
NEUTROPHILS # BLD AUTO: 3.15 K/UL — SIGNIFICANT CHANGE UP (ref 1.8–7.4)
NEUTROPHILS NFR BLD AUTO: 49.3 % — SIGNIFICANT CHANGE UP (ref 43–77)
NRBC # BLD: 0 /100 WBCS — SIGNIFICANT CHANGE UP (ref 0–0)
NT-PROBNP SERPL-SCNC: 168 PG/ML — SIGNIFICANT CHANGE UP (ref 0–300)
PLATELET # BLD AUTO: 214 K/UL — SIGNIFICANT CHANGE UP (ref 150–400)
POTASSIUM SERPL-MCNC: 4.7 MMOL/L — SIGNIFICANT CHANGE UP (ref 3.5–5.3)
POTASSIUM SERPL-SCNC: 4.7 MMOL/L — SIGNIFICANT CHANGE UP (ref 3.5–5.3)
RBC # BLD: 4.18 M/UL — SIGNIFICANT CHANGE UP (ref 3.8–5.2)
RBC # FLD: 13.4 % — SIGNIFICANT CHANGE UP (ref 10.3–14.5)
SODIUM SERPL-SCNC: 140 MMOL/L — SIGNIFICANT CHANGE UP (ref 135–145)
WBC # BLD: 6.38 K/UL — SIGNIFICANT CHANGE UP (ref 3.8–10.5)
WBC # FLD AUTO: 6.38 K/UL — SIGNIFICANT CHANGE UP (ref 3.8–10.5)

## 2020-05-18 PROCEDURE — 93970 EXTREMITY STUDY: CPT | Mod: 26

## 2020-05-18 PROCEDURE — 71046 X-RAY EXAM CHEST 2 VIEWS: CPT | Mod: 26

## 2020-05-18 PROCEDURE — 99285 EMERGENCY DEPT VISIT HI MDM: CPT | Mod: 25

## 2020-05-18 PROCEDURE — 71046 X-RAY EXAM CHEST 2 VIEWS: CPT

## 2020-05-18 PROCEDURE — 83880 ASSAY OF NATRIURETIC PEPTIDE: CPT

## 2020-05-18 PROCEDURE — 36415 COLL VENOUS BLD VENIPUNCTURE: CPT

## 2020-05-18 PROCEDURE — 93970 EXTREMITY STUDY: CPT

## 2020-05-18 PROCEDURE — 99284 EMERGENCY DEPT VISIT MOD MDM: CPT | Mod: 25

## 2020-05-18 PROCEDURE — 80048 BASIC METABOLIC PNL TOTAL CA: CPT

## 2020-05-18 PROCEDURE — 85025 COMPLETE CBC W/AUTO DIFF WBC: CPT

## 2020-05-18 PROCEDURE — 99213 OFFICE O/P EST LOW 20 MIN: CPT

## 2020-05-18 RX ORDER — ACETAMINOPHEN 500 MG
975 TABLET ORAL ONCE
Refills: 0 | Status: COMPLETED | OUTPATIENT
Start: 2020-05-18 | End: 2020-05-18

## 2020-05-18 RX ORDER — FUROSEMIDE 40 MG
1 TABLET ORAL
Qty: 14 | Refills: 0
Start: 2020-05-18 | End: 2020-05-31

## 2020-05-18 RX ADMIN — Medication 975 MILLIGRAM(S): at 17:24

## 2020-05-18 NOTE — ED PROVIDER NOTE - NSFOLLOWUPINSTRUCTIONS_ED_ALL_ED_FT
Leg swelling    Elevate your legs.  Try taking lasix 1 tab once a day as needed for swelling.  Use tylenol as needed for pain.  Follow up with Dr Blas.  Return for increased swelling, difficulty breathing, chest pain, any other concerns.  Leg Edema    WHAT YOU NEED TO KNOW:    Leg edema is swelling caused by fluid buildup. Your legs may swell if you sit or stand for long periods of time, are pregnant, or are injured. Swelling may also occur if you have heart failure or circulation problems. This means that your heart does not pump blood through your body as it should.    DISCHARGE INSTRUCTIONS:    Self-care:     Elevate your legs: Raise your legs above the level of your heart as often as you can. This will help decrease swelling and pain. Prop your legs on pillows or blankets to keep them elevated comfortably.      Wear pressure stockings: These tight stockings put pressure on your legs to promote blood flow and prevent blood clots. Wear the stockings during the day. Do not wear them while you sleep.      Apply heat: Heat helps decrease pain and swelling. Apply heat on the area for 20 to 30 minutes every 2 hours for as many days as directed.       Stay active: Do not stand or sit for long periods of time. Ask your healthcare provider about the best exercise plan for you.      Eat healthy foods: Healthy foods include fruits, vegetables, whole-grain breads, low-fat dairy products, beans, lean meats, and fish. Ask if you need to be on a special diet. Limit salt. Salt will make your body hold even more fluid.    Follow up with your healthcare provider as directed: Write down your questions so you remember to ask them during your visits.     Contact your healthcare provider if:     You have a fever or feel more tired than usual.      The veins in your legs look larger than usual. They may look full or bulging.      Your legs itch or feel heavy.      You have red or white areas or sores on your legs. The skin may also appear dimpled or have indentations.      You are gaining weight.      You have trouble moving your ankles.      The swelling does not go away, or other parts of your body swell.      You have questions or concerns about your condition or care.    Return to the emergency department if:     You cannot walk.      You feel faint or confused.       Your skin turns blue or gray.      Your leg feels warm, tender, and painful. It may be swollen and red.      You have chest pain or trouble breathing that is worse when you lie down.      You suddenly feel lightheaded and have trouble breathing.      You have new and sudden chest pain. You may have more pain when you take deep breaths or cough. You may also cough up blood.

## 2020-05-18 NOTE — ED ADULT NURSE NOTE - CHPI ED NUR SYMPTOMS NEG
no tingling/no nausea/no fever/no pain/no vomiting/no weakness/no chills/no decreased eating/drinking/no dizziness

## 2020-05-18 NOTE — ED PROVIDER NOTE - PATIENT PORTAL LINK FT
You can access the FollowMyHealth Patient Portal offered by [facility] by registering at the following website: [email or web address]. By joining [employer]’s FollowMyHealth portal, you will also be able to view your health information using other applications (apps) compatible with our system.

## 2020-05-18 NOTE — ED PROVIDER NOTE - PMH
Degenerative disc disease, lumbar    GERD (gastroesophageal reflux disease)    Herniated disc, cervical    Mitral valve prolapse    Rheumatic fever    Vasovagal episode    Vertigo

## 2020-05-18 NOTE — ED PROVIDER NOTE - DIAGNOSTIC INTERPRETATION
ER Physician:  Julia Director  CHEST XRAY INTERPRETATION: lungs clear, heart shadow normal, bony structures intact

## 2020-05-18 NOTE — ED PROVIDER NOTE - OBJECTIVE STATEMENT
50 yo female h/o djd, herniated cervical and lumbar disks, vertigo, hysterectomy for fibroids, gerd, rheumatic fever, mitral valve prolapse c/o 50 yo female h/o djd, herniated cervical and lumbar disks, vertigo, hysterectomy for fibroids, gerd, rheumatic fever, mitral valve prolapse, vasovagal syncope c/o 48 yo female h/o djd, herniated cervical and lumbar disks, vertigo, hysterectomy for fibroids, gerd, rheumatic fever, mitral valve prolapse, vasovagal syncope, poss orthostatic hypotension c/o several days anasarca improved upper ext and body swelling but cont le edema L > R w bilat knee/lower leg pain.  + h/o superficial thrombophlebitis R popliteal area s/p vein surgery but no h/o dvt.  Pt tried taking otc diuretic w some improvement in sx.  No h/o chf, renal dysfunction.  Pt reports viral type sx x ~ 7 wk that are now much improved s/p augmentin x 2 wk and mucinex; pt reports neg covid test.  Pt states she has occ cp and sob that are much improved from prior with her viral symptoms.  No abd pain, n/v/d.  Nl urination.  Fever is resolved.  Pt taking otc meds w occasional improvement.

## 2020-05-18 NOTE — ED PROVIDER NOTE - CLINICAL SUMMARY MEDICAL DECISION MAKING FREE TEXT BOX
Pt c/o several days body and le edema w improved body edema.  + L > R LE and calf ttp.  ? dvt, ? chf (no sig sob, cp since improved viral sx to suggest chf), ? renal issue w resulting edema, ? baker's cyst, ? pvd.  Plan labs, cxr, doppler u/s, pain meds, reassess.

## 2020-05-18 NOTE — ED ADULT NURSE NOTE - OBJECTIVE STATEMENT
48 y/o female c/o BLE swelling and right knee pain xweeks + hematomas to both legs sent by PCP for r/o DVT.

## 2020-05-18 NOTE — ED ADULT TRIAGE NOTE - CHIEF COMPLAINT QUOTE
c/o BLE swelling, right knee pain x few weeks.  Denies falls / injury, sob, chest pain.  Pt was told by her pcp to come in to rule out dvt.  Pt verbalized she tested covid negative recently.  Mask applied pta

## 2020-05-18 NOTE — ED PROVIDER NOTE - CARE PROVIDER_API CALL
Devon Blas  INTERNAL MEDICINE  [address] [address]  Phone: [phone]  Fax: [phone]  Follow Up Time:

## 2020-05-18 NOTE — ED PROVIDER NOTE - MUSCULOSKELETAL, MLM
Spine appears normal, range of motion is not limited, nonpitting edema bilat le L > R, + bilat calf ttp, dp 1+

## 2020-05-22 DIAGNOSIS — R60.0 LOCALIZED EDEMA: ICD-10-CM

## 2020-05-22 DIAGNOSIS — Z88.8 ALLERGY STATUS TO OTHER DRUGS, MEDICAMENTS AND BIOLOGICAL SUBSTANCES STATUS: ICD-10-CM

## 2020-05-22 DIAGNOSIS — Z79.899 OTHER LONG TERM (CURRENT) DRUG THERAPY: ICD-10-CM

## 2020-05-22 DIAGNOSIS — R05 COUGH: ICD-10-CM

## 2020-11-12 ENCOUNTER — EMERGENCY (EMERGENCY)
Facility: HOSPITAL | Age: 50
LOS: 1 days | Discharge: HOME | End: 2020-11-12
Attending: EMERGENCY MEDICINE | Admitting: EMERGENCY MEDICINE
Payer: MEDICARE

## 2020-11-12 VITALS
TEMPERATURE: 98 F | RESPIRATION RATE: 18 BRPM | DIASTOLIC BLOOD PRESSURE: 83 MMHG | SYSTOLIC BLOOD PRESSURE: 152 MMHG | OXYGEN SATURATION: 100 % | HEART RATE: 81 BPM

## 2020-11-12 VITALS
RESPIRATION RATE: 18 BRPM | OXYGEN SATURATION: 100 % | SYSTOLIC BLOOD PRESSURE: 137 MMHG | HEIGHT: 70 IN | TEMPERATURE: 98 F | WEIGHT: 216.05 LBS | DIASTOLIC BLOOD PRESSURE: 84 MMHG | HEART RATE: 89 BPM

## 2020-11-12 DIAGNOSIS — Z98.89 OTHER SPECIFIED POSTPROCEDURAL STATES: Chronic | ICD-10-CM

## 2020-11-12 LAB
ALBUMIN SERPL ELPH-MCNC: 3.9 G/DL — SIGNIFICANT CHANGE UP (ref 3.3–5)
ALP SERPL-CCNC: 70 U/L — SIGNIFICANT CHANGE UP (ref 40–120)
ALT FLD-CCNC: <5 U/L — LOW (ref 10–45)
ANION GAP SERPL CALC-SCNC: 9 MMOL/L — SIGNIFICANT CHANGE UP (ref 5–17)
APPEARANCE UR: CLEAR — SIGNIFICANT CHANGE UP
AST SERPL-CCNC: 14 U/L — SIGNIFICANT CHANGE UP (ref 10–40)
BASOPHILS # BLD AUTO: 0.02 K/UL — SIGNIFICANT CHANGE UP (ref 0–0.2)
BASOPHILS NFR BLD AUTO: 0.3 % — SIGNIFICANT CHANGE UP (ref 0–2)
BILIRUB SERPL-MCNC: 0.3 MG/DL — SIGNIFICANT CHANGE UP (ref 0.2–1.2)
BILIRUB UR-MCNC: NEGATIVE — SIGNIFICANT CHANGE UP
BUN SERPL-MCNC: 4 MG/DL — LOW (ref 7–23)
CALCIUM SERPL-MCNC: 9.2 MG/DL — SIGNIFICANT CHANGE UP (ref 8.4–10.5)
CHLORIDE SERPL-SCNC: 103 MMOL/L — SIGNIFICANT CHANGE UP (ref 96–108)
CO2 SERPL-SCNC: 27 MMOL/L — SIGNIFICANT CHANGE UP (ref 22–31)
COLOR SPEC: YELLOW — SIGNIFICANT CHANGE UP
CREAT SERPL-MCNC: 0.69 MG/DL — SIGNIFICANT CHANGE UP (ref 0.5–1.3)
DIFF PNL FLD: NEGATIVE — SIGNIFICANT CHANGE UP
EOSINOPHIL # BLD AUTO: 0.06 K/UL — SIGNIFICANT CHANGE UP (ref 0–0.5)
EOSINOPHIL NFR BLD AUTO: 1 % — SIGNIFICANT CHANGE UP (ref 0–6)
GLUCOSE SERPL-MCNC: 84 MG/DL — SIGNIFICANT CHANGE UP (ref 70–99)
GLUCOSE UR QL: NEGATIVE — SIGNIFICANT CHANGE UP
HCT VFR BLD CALC: 38.7 % — SIGNIFICANT CHANGE UP (ref 34.5–45)
HGB BLD-MCNC: 12.4 G/DL — SIGNIFICANT CHANGE UP (ref 11.5–15.5)
IMM GRANULOCYTES NFR BLD AUTO: 0.3 % — SIGNIFICANT CHANGE UP (ref 0–1.5)
KETONES UR-MCNC: NEGATIVE — SIGNIFICANT CHANGE UP
LACTATE SERPL-SCNC: 1.6 MMOL/L — SIGNIFICANT CHANGE UP (ref 0.5–2)
LEUKOCYTE ESTERASE UR-ACNC: NEGATIVE — SIGNIFICANT CHANGE UP
LYMPHOCYTES # BLD AUTO: 2.19 K/UL — SIGNIFICANT CHANGE UP (ref 1–3.3)
LYMPHOCYTES # BLD AUTO: 36 % — SIGNIFICANT CHANGE UP (ref 13–44)
MCHC RBC-ENTMCNC: 26.7 PG — LOW (ref 27–34)
MCHC RBC-ENTMCNC: 32 GM/DL — SIGNIFICANT CHANGE UP (ref 32–36)
MCV RBC AUTO: 83.2 FL — SIGNIFICANT CHANGE UP (ref 80–100)
MONOCYTES # BLD AUTO: 0.78 K/UL — SIGNIFICANT CHANGE UP (ref 0–0.9)
MONOCYTES NFR BLD AUTO: 12.8 % — SIGNIFICANT CHANGE UP (ref 2–14)
NEUTROPHILS # BLD AUTO: 3.01 K/UL — SIGNIFICANT CHANGE UP (ref 1.8–7.4)
NEUTROPHILS NFR BLD AUTO: 49.6 % — SIGNIFICANT CHANGE UP (ref 43–77)
NITRITE UR-MCNC: NEGATIVE — SIGNIFICANT CHANGE UP
NRBC # BLD: 0 /100 WBCS — SIGNIFICANT CHANGE UP (ref 0–0)
PH UR: 7 — SIGNIFICANT CHANGE UP (ref 5–8)
PLATELET # BLD AUTO: 208 K/UL — SIGNIFICANT CHANGE UP (ref 150–400)
POTASSIUM SERPL-MCNC: 4 MMOL/L — SIGNIFICANT CHANGE UP (ref 3.5–5.3)
POTASSIUM SERPL-SCNC: 4 MMOL/L — SIGNIFICANT CHANGE UP (ref 3.5–5.3)
PROT SERPL-MCNC: 7.3 G/DL — SIGNIFICANT CHANGE UP (ref 6–8.3)
PROT UR-MCNC: NEGATIVE MG/DL — SIGNIFICANT CHANGE UP
RBC # BLD: 4.65 M/UL — SIGNIFICANT CHANGE UP (ref 3.8–5.2)
RBC # FLD: 13 % — SIGNIFICANT CHANGE UP (ref 10.3–14.5)
SARS-COV-2 RNA SPEC QL NAA+PROBE: SIGNIFICANT CHANGE UP
SODIUM SERPL-SCNC: 139 MMOL/L — SIGNIFICANT CHANGE UP (ref 135–145)
SP GR SPEC: 1.01 — SIGNIFICANT CHANGE UP (ref 1–1.03)
UROBILINOGEN FLD QL: 0.2 E.U./DL — SIGNIFICANT CHANGE UP
WBC # BLD: 6.08 K/UL — SIGNIFICANT CHANGE UP (ref 3.8–10.5)
WBC # FLD AUTO: 6.08 K/UL — SIGNIFICANT CHANGE UP (ref 3.8–10.5)

## 2020-11-12 PROCEDURE — 96375 TX/PRO/DX INJ NEW DRUG ADDON: CPT

## 2020-11-12 PROCEDURE — 96365 THER/PROPH/DIAG IV INF INIT: CPT

## 2020-11-12 PROCEDURE — 36415 COLL VENOUS BLD VENIPUNCTURE: CPT

## 2020-11-12 PROCEDURE — U0003: CPT

## 2020-11-12 PROCEDURE — 85025 COMPLETE CBC W/AUTO DIFF WBC: CPT

## 2020-11-12 PROCEDURE — 83605 ASSAY OF LACTIC ACID: CPT

## 2020-11-12 PROCEDURE — 80053 COMPREHEN METABOLIC PANEL: CPT

## 2020-11-12 PROCEDURE — 81003 URINALYSIS AUTO W/O SCOPE: CPT

## 2020-11-12 PROCEDURE — 99285 EMERGENCY DEPT VISIT HI MDM: CPT | Mod: CS

## 2020-11-12 PROCEDURE — 99284 EMERGENCY DEPT VISIT MOD MDM: CPT | Mod: 25

## 2020-11-12 RX ORDER — METOCLOPRAMIDE HCL 10 MG
10 TABLET ORAL ONCE
Refills: 0 | Status: COMPLETED | OUTPATIENT
Start: 2020-11-12 | End: 2020-11-12

## 2020-11-12 RX ORDER — FAMOTIDINE 10 MG/ML
20 INJECTION INTRAVENOUS ONCE
Refills: 0 | Status: COMPLETED | OUTPATIENT
Start: 2020-11-12 | End: 2020-11-12

## 2020-11-12 RX ORDER — FAMOTIDINE 10 MG/ML
1 INJECTION INTRAVENOUS
Qty: 14 | Refills: 0
Start: 2020-11-12 | End: 2020-11-18

## 2020-11-12 RX ORDER — ACETAMINOPHEN 500 MG
1000 TABLET ORAL ONCE
Refills: 0 | Status: COMPLETED | OUTPATIENT
Start: 2020-11-12 | End: 2020-11-12

## 2020-11-12 RX ORDER — SODIUM CHLORIDE 9 MG/ML
1000 INJECTION INTRAMUSCULAR; INTRAVENOUS; SUBCUTANEOUS ONCE
Refills: 0 | Status: COMPLETED | OUTPATIENT
Start: 2020-11-12 | End: 2020-11-12

## 2020-11-12 RX ORDER — ONDANSETRON 8 MG/1
1 TABLET, FILM COATED ORAL
Qty: 9 | Refills: 0
Start: 2020-11-12 | End: 2020-11-14

## 2020-11-12 RX ORDER — ONDANSETRON 8 MG/1
4 TABLET, FILM COATED ORAL ONCE
Refills: 0 | Status: COMPLETED | OUTPATIENT
Start: 2020-11-12 | End: 2020-11-12

## 2020-11-12 RX ADMIN — Medication 1000 MILLIGRAM(S): at 18:00

## 2020-11-12 RX ADMIN — Medication 104 MILLIGRAM(S): at 18:00

## 2020-11-12 RX ADMIN — SODIUM CHLORIDE 1000 MILLILITER(S): 9 INJECTION INTRAMUSCULAR; INTRAVENOUS; SUBCUTANEOUS at 17:09

## 2020-11-12 RX ADMIN — FAMOTIDINE 20 MILLIGRAM(S): 10 INJECTION INTRAVENOUS at 17:09

## 2020-11-12 RX ADMIN — ONDANSETRON 4 MILLIGRAM(S): 8 TABLET, FILM COATED ORAL at 17:22

## 2020-11-12 RX ADMIN — Medication 400 MILLIGRAM(S): at 17:09

## 2020-11-12 NOTE — ED PROVIDER NOTE - OBJECTIVE STATEMENT
The pt is a 49 y/o F, who presents to ED c/o scratchy throat, R ear discomfort, and diarrhea x 1 d. Pt states that initially had loose BMs, but now watery, some lower abd cramping, + nausea, + fatigue, has not taken any meds for symptoms, no recent abx use, no recent travel. Denies fevers, chills, cp, sob, emesis, dizziness, syncope, dysuria, dysphagia, h/a

## 2020-11-12 NOTE — ED ADULT NURSE NOTE - CHIEF COMPLAINT QUOTE
patient complains of diarrhea x 2 days with throat pain and generalized weakness. denies cough, chest pain, sob. she states that she also has been having low grade fever
Declined

## 2020-11-12 NOTE — ED PROVIDER NOTE - CLINICAL SUMMARY MEDICAL DECISION MAKING FREE TEXT BOX
pt c/o vague viral symptoms - scratchy throat, r earache, diarrhea, feels dehydrated. afebrile, non toxic, no c dif risk factors, benign abd, labs wnl, given ivf and tyl plus antiemetics w/symptomatic relief, covid sent, pt unable to provide stool sample for culture, stable for dc, BRAT diet and proper hydration discussed, pt understands and agrees w/plan, strict return precautions given

## 2020-11-12 NOTE — ED ADULT NURSE NOTE - OBJECTIVE STATEMENT
patient complains of nausea/diarrhea (first soft stool, the mixed with liquid greenish stool) for 2 days with throat pain (feeling of lump in the throat) and generalized weakness. denies cough, chest pain, sob. Pt also complains of generalized abdominal pain for 2 days. Pt states that she also has been having low grade fever as high as 100.6 since about 4 days ago along with night sweats.

## 2020-11-12 NOTE — ED PROVIDER NOTE - ATTENDING CONTRIBUTION TO CARE
here with diarrhea, abd cramping, lightheaded/ fatigued. exam non focal, abd soft, no focal tenderness. given ivf/ symptomatic treatment for suspected viral illness with improvement. covid sent and pending. labs with normal wbc, renal function, lytes, ua neg. return precautions discussed

## 2020-11-12 NOTE — ED PROVIDER NOTE - ENMT, MLM
Airway patent, Nasal mucosa clear. Mouth with normal mucosa. Throat has no vesicles, no oropharyngeal exudates and uvula is midline. Ears: Airway patent, Nasal mucosa clear. Mouth with normal mucosa. Throat has no vesicles, no oropharyngeal exudates and uvula is midline. Ears: TMs clear b/l, no cervical lymphadenopathy b/l

## 2020-11-12 NOTE — ED PROVIDER NOTE - PATIENT PORTAL LINK FT
You can access the FollowMyHealth Patient Portal offered by [facility] by registering at the following website: [email or web address]. By joining [employer]’s FollowMyHealth portal, you will also be able to view your health information using other applications (apps) compatible with our system.

## 2020-11-12 NOTE — ED PROVIDER NOTE - NSFOLLOWUPINSTRUCTIONS_ED_ALL_ED_FT
DRINK LOTS OF FLUIDS - WATER, GETORADE, VITAMIN WATER, AVOID COFFEE AND JUICES AND ALCOHOL, EAT BLAND DIET, TYLENOL IF NEEDED, RETURN IMMEDIATELY FOR ANY WORSENING OR CONCERNING SYMPTOMS  Food Choices to Help Relieve Diarrhea, Adult  When you have diarrhea, the foods you eat and your eating habits are very important. Choosing the right foods and drinks can help:  •Relieve diarrhea.  •Replace lost fluids and nutrients.  •Prevent dehydration.  What general guidelines should I follow?  Relieving diarrhea   •Choose foods with less than 2 g or .07 oz. of fiber per serving.  •Limit fats to less than 8 tsp (38 g or 1.34 oz.) a day.  •Avoid the following:  •Foods and beverages sweetened with high-fructose corn syrup, honey, or sugar alcohols such as xylitol, sorbitol, and mannitol.  •Foods that contain a lot of fat or sugar.  •Fried, greasy, or spicy foods.  •High-fiber grains, breads, and cereals.  •Raw fruits and vegetables.  •Eat foods that are rich in probiotics. These foods include dairy products such as yogurt and fermented milk products. They help increase healthy bacteria in the stomach and intestines (gastrointestinal tract, or GI tract).  •If you have lactose intolerance, avoid dairy products. These may make your diarrhea worse.  •Take medicine to help stop diarrhea (antidiarrheal medicine) only as told by your health care provider.  Replacing nutrients   •Eat small meals or snacks every 3–4 hours.  •Eat bland foods, such as white rice, toast, or baked potato, until your diarrhea starts to get better. Gradually reintroduce nutrient-rich foods as tolerated or as told by your health care provider. This includes:  •Well-cooked protein foods.  •Peeled, seeded, and soft-cooked fruits and vegetables.  •Low-fat dairy products.  •Take vitamin and mineral supplements as told by your health care provider.  Preventing dehydration   •Start by sipping water or a special solution to prevent dehydration (oral rehydration solution, ORS). Urine that is clear or pale yellow means that you are getting enough fluid.  •Try to drink at least 8–10 cups of fluid each day to help replace lost fluids.  •You may add other liquids in addition to water, such as clear juice or decaffeinated sports drinks, as tolerated or as told by your health care provider.  •Avoid drinks with caffeine, such as coffee, tea, or soft drinks.  •Avoid alcohol.  What foods are recommended?  The items listed may not be a complete list. Talk with your health care provider about what dietary choices are best for you.  Grains   White rice. White, Finnish, or aicha breads (fresh or toasted), including plain rolls, buns, or bagels. White pasta. Saltine, soda, or erin crackers. Pretzels. Low-fiber cereal. Cooked cereals made with water (such as cornmeal, farina, or cream cereals). Plain muffins. Matzo. [place] toast. Zwieback.  Vegetables   Potatoes (without the skin). Most well-cooked and canned vegetables without skins or seeds. Tender lettuce.  Fruits   Apple sauce. Fruits canned in juice. Cooked apricots, cherries, grapefruit, peaches, pears, or plums. Fresh bananas and cantaloupe.  Meats and other protein foods   Baked or boiled chicken. Eggs. Tofu. Fish. Seafood. Smooth nut butters. Ground or well-cooked tender beef, ham, veal, lamb, pork, or poultry.  Dairy   Plain yogurt, kefir, and unsweetened liquid yogurt. Lactose-free milk, buttermilk, skim milk, or soy milk. Low-fat or nonfat hard cheese.  Beverages   Water. Low-calorie sports drinks. Fruit juices without pulp. Strained tomato and vegetable juices. Decaffeinated teas. Sugar-free beverages not sweetened with sugar alcohols. Oral rehydration solutions, if approved by your health care provider.  Seasoning and other foods   Bouillon, broth, or soups made from recommended foods.  What foods are not recommended?  The items listed may not be a complete list. Talk with your health care provider about what dietary choices are best for you.  Grains   Whole grain, whole wheat, bran, or rye breads, rolls, pastas, and crackers. Wild or brown rice. Whole grain or bran cereals. Barley. Oats and oatmeal. Corn tortillas or taco shells. Granola. Popcorn.  Vegetables   Raw vegetables. Fried vegetables. Cabbage, broccoli, [place] sprouts, artichokes, baked beans, beet greens, corn, kale, legumes, peas, sweet potatoes, and yams. Potato skins. Cooked spinach and cabbage.  Fruits   Dried fruit, including raisins and dates. Raw fruits. Stewed or dried prunes. Canned fruits with syrup.  Meat and other protein foods   Fried or fatty meats. Deli meats. [place] nut butters. Nuts and seeds. Beans and lentils. Vogt. Hot dogs. Sausage.  Dairy   High-fat cheeses. Whole milk, chocolate milk, and beverages made with milk, such as milk shakes. Half-and-half. Cream. sour cream. Ice cream.  Beverages  Caffeinated beverages (such as coffee, tea, soda, or energy drinks). Alcoholic beverages. Fruit juices with pulp. Prune juice. Soft drinks sweetened with high-fructose corn syrup or sugar alcohols. High-calorie sports drinks  Fats and oils   Butter. Cream sauces. Margarine. Salad oils. Plain salad dressings. Olives. Avocados. Mayonnaise.  Sweets and desserts   Sweet rolls, doughnuts, and sweet breads. Sugar-free desserts sweetened with sugar alcohols such as xylitol and sorbitol.  Seasoning and other foods   Honey. Hot sauce. Chili powder. Gravy. Cream-based or milk-based soups. Pancakes and waffles.  Summary  •When you have diarrhea, the foods you eat and your eating habits are very important.  •Make sure you get at least 8–10 cups of fluid each day, or enough to keep your urine clear or pale yellow.  •Eat bland foods and gradually reintroduce healthy, nutrient-rich foods as tolerated, or as told by your health care provider.  •Avoid high-fiber, fried, greasy, or spicy foods.

## 2020-11-15 DIAGNOSIS — R19.7 DIARRHEA, UNSPECIFIED: ICD-10-CM

## 2020-11-15 DIAGNOSIS — Z20.828 CONTACT WITH AND (SUSPECTED) EXPOSURE TO OTHER VIRAL COMMUNICABLE DISEASES: ICD-10-CM

## 2020-11-15 DIAGNOSIS — B34.9 VIRAL INFECTION, UNSPECIFIED: ICD-10-CM

## 2021-07-29 ENCOUNTER — APPOINTMENT (OUTPATIENT)
Dept: INTERNAL MEDICINE | Facility: CLINIC | Age: 51
End: 2021-07-29

## 2021-08-10 ENCOUNTER — LABORATORY RESULT (OUTPATIENT)
Age: 51
End: 2021-08-10

## 2021-08-10 ENCOUNTER — APPOINTMENT (OUTPATIENT)
Dept: INTERNAL MEDICINE | Facility: CLINIC | Age: 51
End: 2021-08-10
Payer: MEDICARE

## 2021-08-10 VITALS
SYSTOLIC BLOOD PRESSURE: 122 MMHG | OXYGEN SATURATION: 99 % | WEIGHT: 221 LBS | DIASTOLIC BLOOD PRESSURE: 83 MMHG | HEIGHT: 69 IN | TEMPERATURE: 97.2 F | BODY MASS INDEX: 32.73 KG/M2 | HEART RATE: 87 BPM

## 2021-08-10 DIAGNOSIS — R10.13 EPIGASTRIC PAIN: ICD-10-CM

## 2021-08-10 DIAGNOSIS — K59.09 OTHER CONSTIPATION: ICD-10-CM

## 2021-08-10 PROCEDURE — 99215 OFFICE O/P EST HI 40 MIN: CPT | Mod: 25

## 2021-08-10 PROCEDURE — 36415 COLL VENOUS BLD VENIPUNCTURE: CPT

## 2021-08-11 LAB
25(OH)D3 SERPL-MCNC: 24.5 NG/ML
ALBUMIN SERPL ELPH-MCNC: 4.7 G/DL
ALP BLD-CCNC: 83 U/L
ALT SERPL-CCNC: 6 U/L
ANION GAP SERPL CALC-SCNC: 11 MMOL/L
APPEARANCE: ABNORMAL
AST SERPL-CCNC: 16 U/L
BASOPHILS # BLD AUTO: 0.04 K/UL
BASOPHILS NFR BLD AUTO: 0.6 %
BILIRUB SERPL-MCNC: 0.2 MG/DL
BILIRUBIN URINE: NEGATIVE
BLOOD URINE: NEGATIVE
BUN SERPL-MCNC: 12 MG/DL
CALCIUM SERPL-MCNC: 10 MG/DL
CHLORIDE SERPL-SCNC: 102 MMOL/L
CHOLEST SERPL-MCNC: 230 MG/DL
CO2 SERPL-SCNC: 26 MMOL/L
COLOR: YELLOW
CREAT SERPL-MCNC: 0.72 MG/DL
CRP SERPL-MCNC: 20 MG/L
EOSINOPHIL # BLD AUTO: 0.07 K/UL
EOSINOPHIL # BLD MANUAL: 90 /UL
EOSINOPHIL NFR BLD AUTO: 1.1 %
ERYTHROCYTE [SEDIMENTATION RATE] IN BLOOD BY WESTERGREN METHOD: 73 MM/HR
ESTIMATED AVERAGE GLUCOSE: 108 MG/DL
FERRITIN SERPL-MCNC: 92 NG/ML
GLUCOSE QUALITATIVE U: NEGATIVE
GLUCOSE SERPL-MCNC: 95 MG/DL
HBA1C MFR BLD HPLC: 5.4 %
HCT VFR BLD CALC: 42.8 %
HDLC SERPL-MCNC: 62 MG/DL
HGB BLD-MCNC: 13.1 G/DL
IMM GRANULOCYTES NFR BLD AUTO: 0.2 %
KETONES URINE: NORMAL
LDLC SERPL CALC-MCNC: 137 MG/DL
LEUKOCYTE ESTERASE URINE: NEGATIVE
LYMPHOCYTES # BLD AUTO: 2.91 K/UL
LYMPHOCYTES NFR BLD AUTO: 44.9 %
MAN DIFF?: NORMAL
MCHC RBC-ENTMCNC: 27.3 PG
MCHC RBC-ENTMCNC: 30.6 GM/DL
MCV RBC AUTO: 89.4 FL
MONOCYTES # BLD AUTO: 0.66 K/UL
MONOCYTES NFR BLD AUTO: 10.2 %
NEUTROPHILS # BLD AUTO: 2.79 K/UL
NEUTROPHILS NFR BLD AUTO: 43 %
NITRITE URINE: NEGATIVE
NONHDLC SERPL-MCNC: 169 MG/DL
PH URINE: 5.5
PLATELET # BLD AUTO: 293 K/UL
POTASSIUM SERPL-SCNC: 4.2 MMOL/L
PROT SERPL-MCNC: 7.9 G/DL
PROTEIN URINE: ABNORMAL
RBC # BLD: 4.79 M/UL
RBC # FLD: 13.8 %
SODIUM SERPL-SCNC: 140 MMOL/L
SPECIFIC GRAVITY URINE: 1.04
TRIGL SERPL-MCNC: 162 MG/DL
TSH SERPL-ACNC: 2.22 UIU/ML
UROBILINOGEN URINE: ABNORMAL
VIT B12 SERPL-MCNC: 737 PG/ML
WBC # FLD AUTO: 6.48 K/UL

## 2021-08-12 LAB — IGE SER-MCNC: 98 KU/L

## 2021-08-13 ENCOUNTER — NON-APPOINTMENT (OUTPATIENT)
Age: 51
End: 2021-08-13

## 2021-08-13 DIAGNOSIS — R31.29 OTHER MICROSCOPIC HEMATURIA: ICD-10-CM

## 2021-08-21 ENCOUNTER — EMERGENCY (EMERGENCY)
Facility: HOSPITAL | Age: 51
LOS: 1 days | Discharge: HOME | End: 2021-08-21
Admitting: EMERGENCY MEDICINE
Payer: MEDICARE

## 2021-08-21 VITALS
RESPIRATION RATE: 18 BRPM | HEART RATE: 81 BPM | WEIGHT: 220.9 LBS | DIASTOLIC BLOOD PRESSURE: 85 MMHG | OXYGEN SATURATION: 98 % | SYSTOLIC BLOOD PRESSURE: 134 MMHG | HEIGHT: 70 IN | TEMPERATURE: 98 F

## 2021-08-21 DIAGNOSIS — Z98.89 OTHER SPECIFIED POSTPROCEDURAL STATES: Chronic | ICD-10-CM

## 2021-08-21 LAB
APPEARANCE UR: CLEAR — SIGNIFICANT CHANGE UP
BACTERIA # UR AUTO: PRESENT /HPF
BILIRUB UR-MCNC: NEGATIVE — SIGNIFICANT CHANGE UP
COLOR SPEC: YELLOW — SIGNIFICANT CHANGE UP
COMMENT - URINE: SIGNIFICANT CHANGE UP
COMMENT - URINE: SIGNIFICANT CHANGE UP
DIFF PNL FLD: ABNORMAL
EPI CELLS # UR: ABNORMAL /HPF (ref 0–5)
GLUCOSE UR QL: NEGATIVE — SIGNIFICANT CHANGE UP
KETONES UR-MCNC: NEGATIVE — SIGNIFICANT CHANGE UP
LEUKOCYTE ESTERASE UR-ACNC: ABNORMAL
NITRITE UR-MCNC: NEGATIVE — SIGNIFICANT CHANGE UP
PH UR: 6 — SIGNIFICANT CHANGE UP (ref 5–8)
PROT UR-MCNC: ABNORMAL MG/DL
RBC CASTS # UR COMP ASSIST: < 5 /HPF — SIGNIFICANT CHANGE UP
SP GR SPEC: 1.02 — SIGNIFICANT CHANGE UP (ref 1–1.03)
UROBILINOGEN FLD QL: 0.2 E.U./DL — SIGNIFICANT CHANGE UP
WBC UR QL: ABNORMAL /HPF

## 2021-08-21 PROCEDURE — 87086 URINE CULTURE/COLONY COUNT: CPT

## 2021-08-21 PROCEDURE — 99283 EMERGENCY DEPT VISIT LOW MDM: CPT

## 2021-08-21 PROCEDURE — 81001 URINALYSIS AUTO W/SCOPE: CPT

## 2021-08-21 RX ORDER — FLUCONAZOLE 150 MG/1
1 TABLET ORAL
Qty: 1 | Refills: 0
Start: 2021-08-21 | End: 2021-08-21

## 2021-08-21 NOTE — ED PROVIDER NOTE - PATIENT PORTAL LINK FT
You can access the FollowMyHealth Patient Portal offered by [facility] by registering at the following website: [email or web address]. By joining [employer]’s FollowMyHealth portal, you will also be able to view your health information using other applications (apps) compatible with our system.

## 2021-08-21 NOTE — ED ADULT NURSE NOTE - NSIMPLEMENTINTERV_GEN_ALL_ED
Implemented All Universal Safety Interventions:  [place] to call system. Call bell, personal items and telephone within reach. Instruct patient to call for assistance. Room bathroom lighting operational. Non-slip footwear when patient is off stretcher. Physically safe environment: no spills, clutter or unnecessary equipment. Stretcher in lowest position, wheels locked, appropriate side rails in place.

## 2021-08-21 NOTE — ED PROVIDER NOTE - CLINICAL SUMMARY MEDICAL DECISION MAKING FREE TEXT BOX
pt was on augmentin x 2 wks, now w/vag irritation and dc, not sexually active and no concern for std, exam consistent w/candidiasis, urine sent, will tx w/diflucan, to f/u w/gyn, pt understands and agrees w/plan, strict return precautions given

## 2021-08-21 NOTE — ED PROVIDER NOTE - OBJECTIVE STATEMENT
The pt is a 50 y/o M, who presents to ED c/o vag dc and irritation x 1 d. Pt has been on augmentin over past 2 wks. Reports thick dc, vaginal irritation, has not used any OTC meds for symptoms. Denies abd pain, fever, dysuria, n/v/d. Not sexually active, no concern for STD - declining testing.

## 2021-08-21 NOTE — ED PROVIDER NOTE - NSFOLLOWUPINSTRUCTIONS_ED_ALL_ED_FT
Vaginal Yeast Infection, Adult  Vaginal yeast infection is a condition that causes vaginal discharge as well as soreness, swelling, and redness (inflammation) of the vagina. This is a common condition. Some women get this infection frequently.  What are the causes?  This condition is caused by a change in the normal balance of the yeast (candida) and bacteria that live in the vagina. This change causes an overgrowth of yeast, which causes the inflammation  What increases the risk?  The condition is more likely to develop in women who:  •Take antibiotic medicines.  •Have diabetes.  •Take birth control pills.  •Are pregnant.  •Douche often.  •Have a weak body defense system (immune system).  •Have been taking steroid medicines for a long time.  •Frequently wear tight clothing.  What are the signs or symptoms?  Symptoms of this condition include:  •White, thick, creamy vaginal discharge.  •Swelling, itching, redness, and irritation of the vagina. The lips of the vagina (vulva) may be affected as well.  •Pain or a burning feeling while urinating.  •Pain during sex.  How is this diagnosed?  This condition is diagnosed based on:  •Your medical history.  •A physical exam.  •A pelvic exam. Your health care provider will examine a sample of your vaginal discharge under a microscope. Your health care provider may send this sample for testing to confirm the diagnosis.  How is this treated?  This condition is treated with medicine. Medicines may be over-the-counter or prescription. You may be told to use one or more of the following:  •Medicine that is taken by mouth (orally).  •Medicine that is applied as a cream (topically).  •Medicine that is inserted directly into the vagina (suppository).  Follow these instructions at home:  Lifestyle   • Do not have sex until your health care provider approves. Tell your sex partner that you have a yeast infection. That person should go to his or her health care provider and ask if they should also be treated.  •Wear breathable cotton underwear.  General instructions   •Take or apply over-the-counter and prescription medicines only as told by your health care provider.  •Eat more yogurt. This may help to keep your yeast infection from returning.  • Do not use tampons until your health care provider approves.  •Try taking a sitz bath to help with discomfort. This is a warm water bath that is taken while you are sitting down. The water should only come up to your hips and should cover your buttocks. Do this 3–4 times per day or as told by your health care provider.  • Do not douche.  •If you have diabetes, keep your blood sugar levels under control.  •Keep all follow-up visits as told by your health care provider. This is important.  Contact a health care provider if  •You have a fever.  •Your symptoms go away and then return.  •Your symptoms do not get better with treatment.  •Your symptoms get worse.  •You have new symptoms.  •You develop blisters in or around your vagina.  •You have blood coming from your vagina and it is not your menstrual period.  •You develop pain in your abdomen.  Summary  •Vaginal yeast infection is a condition that causes discharge as well as soreness, swelling, and redness (inflammation) of the vagina.  •This condition is treated with medicine. Medicines may be over-the-counter or prescription.  •Take or apply over-the-counter and prescription medicines only as told by your health care provider.  • Do not douche. Do not have sex or use tampons until your health care provider approves.      •Contact a health care provider if your symptoms do not get better with treatment or your symptoms go away and then return.

## 2021-08-21 NOTE — ED ADULT NURSE NOTE - OBJECTIVE STATEMENT
Patient AOX4 c/o vaginal discharge, dryness, and irritation x this AM. Patient reports recently completing a course of antibiotics. Speaking in full, complete sentences. Answering questions and following verbal commands appropriately.

## 2021-08-22 LAB
CULTURE RESULTS: SIGNIFICANT CHANGE UP
SPECIMEN SOURCE: SIGNIFICANT CHANGE UP

## 2021-08-25 DIAGNOSIS — B37.3 CANDIDIASIS OF VULVA AND VAGINA: ICD-10-CM

## 2021-08-25 DIAGNOSIS — N89.8 OTHER SPECIFIED NONINFLAMMATORY DISORDERS OF VAGINA: ICD-10-CM

## 2021-08-25 DIAGNOSIS — E66.3 OVERWEIGHT: ICD-10-CM

## 2021-08-25 DIAGNOSIS — Z88.8 ALLERGY STATUS TO OTHER DRUGS, MEDICAMENTS AND BIOLOGICAL SUBSTANCES: ICD-10-CM

## 2021-08-25 DIAGNOSIS — Z88.1 ALLERGY STATUS TO OTHER ANTIBIOTIC AGENTS STATUS: ICD-10-CM

## 2021-09-02 ENCOUNTER — EMERGENCY (EMERGENCY)
Facility: HOSPITAL | Age: 51
LOS: 1 days | Discharge: HOME | End: 2021-09-02
Admitting: STUDENT IN AN ORGANIZED HEALTH CARE EDUCATION/TRAINING PROGRAM
Payer: MEDICARE

## 2021-09-02 VITALS
OXYGEN SATURATION: 99 % | SYSTOLIC BLOOD PRESSURE: 159 MMHG | DIASTOLIC BLOOD PRESSURE: 85 MMHG | RESPIRATION RATE: 18 BRPM | HEIGHT: 70 IN | WEIGHT: 220.9 LBS | TEMPERATURE: 98 F | HEART RATE: 82 BPM

## 2021-09-02 DIAGNOSIS — Z98.89 OTHER SPECIFIED POSTPROCEDURAL STATES: Chronic | ICD-10-CM

## 2021-09-02 LAB — SARS-COV-2 RNA SPEC QL NAA+PROBE: SIGNIFICANT CHANGE UP

## 2021-09-02 PROCEDURE — U0003: CPT

## 2021-09-02 PROCEDURE — 99283 EMERGENCY DEPT VISIT LOW MDM: CPT

## 2021-09-02 PROCEDURE — 99282 EMERGENCY DEPT VISIT SF MDM: CPT | Mod: CS

## 2021-09-02 PROCEDURE — U0005: CPT

## 2021-09-02 NOTE — ED ADULT NURSE NOTE - NSICDXPASTMEDICALHX_GEN_ALL_CORE_FT
PAST MEDICAL HISTORY:  Degenerative disc disease, lumbar     GERD (gastroesophageal reflux disease)     Herniated disc, cervical     Mitral valve prolapse     Rheumatic fever     Vasovagal episode     Vertigo

## 2021-09-02 NOTE — ED PROVIDER NOTE - OBJECTIVE STATEMENT
Patient is presenting to the Emergency Department with a request to have COVID-19 testing--visiting sister in ICU and needs results.  Denies symptoms, including cough, shortness of breath, fever, chills, bodyaches or sore throat.

## 2021-09-02 NOTE — ED ADULT NURSE NOTE - NSICDXPASTSURGICALHX_GEN_ALL_CORE_FT
PAST SURGICAL HISTORY:  H/O myomectomy     History of partial hysterectomy     S/P arthroscopic knee surgery

## 2021-09-02 NOTE — ED PROVIDER NOTE - PATIENT PORTAL LINK FT
You can access the FollowMyHealth Patient Portal offered by [facility] by registering at the following website: [email or web address]. By joining [email or web address]’s FollowMyHealth portal, you will also be able to view your health information using other applications (apps) compatible with our system.

## 2021-09-05 DIAGNOSIS — Z88.8 ALLERGY STATUS TO OTHER DRUGS, MEDICAMENTS AND BIOLOGICAL SUBSTANCES: ICD-10-CM

## 2021-09-05 DIAGNOSIS — Z20.822 CONTACT WITH AND (SUSPECTED) EXPOSURE TO COVID-19: ICD-10-CM

## 2021-09-09 RX ORDER — DICLOFENAC SODIUM 1% 10 MG/G
1 GEL TOPICAL
Qty: 1 | Refills: 0 | Status: ACTIVE | COMMUNITY
Start: 2021-08-10 | End: 1900-01-01

## 2021-10-20 ENCOUNTER — APPOINTMENT (OUTPATIENT)
Dept: ORTHOPEDIC SURGERY | Facility: CLINIC | Age: 51
End: 2021-10-20
Payer: MEDICARE

## 2021-10-20 VITALS
OXYGEN SATURATION: 98 % | WEIGHT: 221 LBS | DIASTOLIC BLOOD PRESSURE: 80 MMHG | HEIGHT: 69 IN | HEART RATE: 85 BPM | BODY MASS INDEX: 32.73 KG/M2 | SYSTOLIC BLOOD PRESSURE: 120 MMHG | TEMPERATURE: 97.9 F

## 2021-10-20 PROCEDURE — 99204 OFFICE O/P NEW MOD 45 MIN: CPT

## 2021-10-24 ENCOUNTER — OUTPATIENT (OUTPATIENT)
Dept: OUTPATIENT SERVICES | Facility: HOSPITAL | Age: 51
LOS: 1 days | End: 2021-10-24

## 2021-10-24 ENCOUNTER — APPOINTMENT (OUTPATIENT)
Dept: MRI IMAGING | Facility: CLINIC | Age: 51
End: 2021-10-24
Payer: MEDICARE

## 2021-10-24 DIAGNOSIS — Z98.89 OTHER SPECIFIED POSTPROCEDURAL STATES: Chronic | ICD-10-CM

## 2021-10-24 PROCEDURE — 72141 MRI NECK SPINE W/O DYE: CPT | Mod: 26,ME

## 2021-10-24 PROCEDURE — G1004: CPT

## 2021-11-01 NOTE — PHYSICAL EXAM
[de-identified] : Physical Exam:\par \par General: patient is well developed, well nourished, in no acute \par distress, alert and oriented x 3. \par \par Mood and affect: normal\par \par Respiratory: no respiratory distress noted\par \par Skin: no scars over spine, skin intact, no erythema, increased warmth\par \par Alignment:The spine is well compensated in the coronal and sagittal plane. \par \par Gait: The patient is able to toe walk and heel walk without difficulty. \par \par Palpation: no tenderness to palpation cervical spine or paraspinal region\par \par Range of motion: Cervical spine ROM is restricted\par \par Neurologic Exam:\par Motor: Manual Muscle testing in the upper and lower extremities is 5 out of 5 in all muscle groups. There is no evidence of muscular atrophy in the upper extremities. Sensory: Sensation to light touch is grossly intact in the upper and lower extremities\par \par Reflexes: DTR are present and symmetric throughout, negative toscano bilaterally, negative inverted radial reflex bilaterally, no clonus, plantar responses are flexor\par \par Special tests: Spurlings sign absent. Lhermitte's sign is absent. .\par \par \par  [de-identified] : XR AP/lateral cervical 9/2021: mild multilevel degenerative changes, no spondylolisthesis, no fractures seen

## 2021-11-01 NOTE — HISTORY OF PRESENT ILLNESS
[de-identified] : Ms. SCHMIDT is a very pleasant 51 year old female who complains of chronic neck pain, worse over the past 2 months, onset while sleeping. Patient states pain originates in left shoulder/trapezius and radiates to the left side of her neck. Pain on onset was 8/10 severity, now 5/10 severity. Denies pain that radiates to upper extremities. Denies upper extremity numbness/weakness/paresthesias. Saw pain management and neurology.\par \par The patient no history of previous spine surgery.\par \par The patient has no history of unexpected weight loss, no history of active cancer, no history bladder or bowel dysfunction, no night pain, no fevers or chills.\par \par The past medical history, surgical history, family history, allergies, medications, 10+ point review of systems, family history and social history were reviewed and non contributory.\par \par

## 2021-11-01 NOTE — DISCUSSION/SUMMARY
[de-identified] : Discussed my findings with the patient. I am recommending an MRI cervical without contras,t order given. The patient will follow up after imaging is completed, sooner if there is an issue. All questions answered.

## 2021-11-03 ENCOUNTER — APPOINTMENT (OUTPATIENT)
Dept: ORTHOPEDIC SURGERY | Facility: CLINIC | Age: 51
End: 2021-11-03
Payer: MEDICARE

## 2021-11-03 VITALS — BODY MASS INDEX: 32.73 KG/M2 | HEIGHT: 69 IN | WEIGHT: 221 LBS

## 2021-11-03 PROCEDURE — 99214 OFFICE O/P EST MOD 30 MIN: CPT

## 2021-11-08 NOTE — HISTORY OF PRESENT ILLNESS
[de-identified] : Follow up 11/3/21: Patient presents for follow up. Denies new neurologic symptoms. Here to review imaging. \par \par Initial 10/20/21: Ms. SCHMIDT is a very pleasant 51 year old female who complains of chronic neck pain, worse over the past 2 months, onset while sleeping. Patient states pain originates in left shoulder/trapezius and radiates to the left side of her neck. Pain on onset was 8/10 severity, now 5/10 severity. Denies pain that radiates to upper extremities. Denies upper extremity numbness/weakness/paresthesias. Saw pain management and neurology.\par \par The patient no history of previous spine surgery.\par \par The patient has no history of unexpected weight loss, no history of active cancer, no history bladder or bowel dysfunction, no night pain, no fevers or chills.\par \par The past medical history, surgical history, family history, allergies, medications, 10+ point review of systems, family history and social history were reviewed and non contributory.\par \par

## 2021-11-08 NOTE — PHYSICAL EXAM
[de-identified] : Physical Exam:\par \par General: patient is well developed, well nourished, in no acute \par distress, alert and oriented x 3. \par \par Mood and affect: normal\par \par Respiratory: no respiratory distress noted\par \par Skin: no scars over spine, skin intact, no erythema, increased warmth\par \par Alignment:The spine is well compensated in the coronal and sagittal plane. \par \par Gait: The patient is able to toe walk and heel walk without difficulty. \par \par Palpation: no tenderness to palpation cervical spine or paraspinal region\par \par Range of motion: Cervical spine ROM is restricted\par \par Neurologic Exam:\par Motor: Manual Muscle testing in the upper and lower extremities is 5 out of 5 in all muscle groups. There is no evidence of muscular atrophy in the upper extremities. Sensory: Sensation to light touch is grossly intact in the upper and lower extremities\par \par Reflexes: DTR are present and symmetric throughout, negative toscano bilaterally, negative inverted radial reflex bilaterally, no clonus, plantar responses are flexor\par \par Special tests: Spurlings sign absent. Lhermitte's sign is absent. .\par \par \par  [de-identified] : MRI cervical 10/2021: C3/C4 small left sided disc herniation and facet artrosis with severe left and moderate right foramianl stenosis; no cord compression or cord signal change\par \par XR AP/lateral cervical 9/2021: mild multilevel degenerative changes, no spondylolisthesis, no fractures seen

## 2021-11-08 NOTE — DISCUSSION/SUMMARY
[de-identified] : Discussed my findings with the patient. MRI cervical shows left C3/C4 foraminal stenosis which may be contributing to her neck and left shoulder pain. There is no indication for surgical intervention at this time. She can continue physical therapy. Is seeing pain management specialist with [facility], can return for consideration of injections. Also given order for soft cervical collar, instructed to wear full time for 1 week, then nighttime only for 1 week. Patient will follow up with me as needed. All questions answered.

## 2022-01-14 ENCOUNTER — LABORATORY RESULT (OUTPATIENT)
Age: 52
End: 2022-01-14

## 2022-01-14 ENCOUNTER — NON-APPOINTMENT (OUTPATIENT)
Age: 52
End: 2022-01-14

## 2022-01-14 ENCOUNTER — APPOINTMENT (OUTPATIENT)
Dept: INTERNAL MEDICINE | Facility: CLINIC | Age: 52
End: 2022-01-14
Payer: MEDICARE

## 2022-01-14 VITALS
DIASTOLIC BLOOD PRESSURE: 75 MMHG | BODY MASS INDEX: 33.18 KG/M2 | HEART RATE: 77 BPM | OXYGEN SATURATION: 99 % | SYSTOLIC BLOOD PRESSURE: 124 MMHG | TEMPERATURE: 97.5 F | HEIGHT: 69 IN | WEIGHT: 224 LBS

## 2022-01-14 DIAGNOSIS — Z87.19 PERSONAL HISTORY OF OTHER DISEASES OF THE DIGESTIVE SYSTEM: ICD-10-CM

## 2022-01-14 DIAGNOSIS — Z12.39 ENCOUNTER FOR OTHER SCREENING FOR MALIGNANT NEOPLASM OF BREAST: ICD-10-CM

## 2022-01-14 DIAGNOSIS — E55.9 VITAMIN D DEFICIENCY, UNSPECIFIED: ICD-10-CM

## 2022-01-14 DIAGNOSIS — R14.0 ABDOMINAL DISTENSION (GASEOUS): ICD-10-CM

## 2022-01-14 PROCEDURE — 99215 OFFICE O/P EST HI 40 MIN: CPT | Mod: 25

## 2022-01-14 PROCEDURE — 36415 COLL VENOUS BLD VENIPUNCTURE: CPT

## 2022-01-14 RX ORDER — SIMETHICONE 125 MG/1
125 TABLET, CHEWABLE ORAL
Qty: 120 | Refills: 0 | Status: ACTIVE | COMMUNITY
Start: 2018-07-26 | End: 1900-01-01

## 2022-01-14 RX ORDER — ADHESIVE TAPE 3"X 2.3 YD
50 MCG TAPE, NON-MEDICATED TOPICAL
Qty: 90 | Refills: 0 | Status: ACTIVE | COMMUNITY
Start: 2018-07-26 | End: 1900-01-01

## 2022-01-15 LAB
25(OH)D3 SERPL-MCNC: 24.6 NG/ML
ALBUMIN SERPL ELPH-MCNC: 4.3 G/DL
ALP BLD-CCNC: 78 U/L
ALT SERPL-CCNC: 8 U/L
ANION GAP SERPL CALC-SCNC: 15 MMOL/L
APPEARANCE: ABNORMAL
AST SERPL-CCNC: 14 U/L
BASOPHILS # BLD AUTO: 0.03 K/UL
BASOPHILS NFR BLD AUTO: 0.4 %
BILIRUB SERPL-MCNC: 0.3 MG/DL
BILIRUBIN URINE: NEGATIVE
BLOOD URINE: NEGATIVE
BUN SERPL-MCNC: 11 MG/DL
CALCIUM SERPL-MCNC: 9.4 MG/DL
CHLORIDE SERPL-SCNC: 101 MMOL/L
CHOLEST SERPL-MCNC: 175 MG/DL
CO2 SERPL-SCNC: 23 MMOL/L
COLOR: YELLOW
CREAT SERPL-MCNC: 0.67 MG/DL
CRP SERPL-MCNC: 12 MG/L
EOSINOPHIL # BLD AUTO: 0.1 K/UL
EOSINOPHIL NFR BLD AUTO: 1.3 %
ERYTHROCYTE [SEDIMENTATION RATE] IN BLOOD BY WESTERGREN METHOD: 26 MM/HR
FERRITIN SERPL-MCNC: 111 NG/ML
GLUCOSE QUALITATIVE U: NEGATIVE
GLUCOSE SERPL-MCNC: 92 MG/DL
HCT VFR BLD CALC: 40.7 %
HDLC SERPL-MCNC: 56 MG/DL
HGB BLD-MCNC: 12.5 G/DL
IMM GRANULOCYTES NFR BLD AUTO: 0.1 %
IRON SATN MFR SERPL: 12 %
IRON SERPL-MCNC: 34 UG/DL
KETONES URINE: NEGATIVE
LDLC SERPL CALC-MCNC: 101 MG/DL
LEUKOCYTE ESTERASE URINE: NEGATIVE
LYMPHOCYTES # BLD AUTO: 2.37 K/UL
LYMPHOCYTES NFR BLD AUTO: 31.4 %
MAN DIFF?: NORMAL
MCHC RBC-ENTMCNC: 26.8 PG
MCHC RBC-ENTMCNC: 30.7 GM/DL
MCV RBC AUTO: 87.2 FL
MONOCYTES # BLD AUTO: 0.89 K/UL
MONOCYTES NFR BLD AUTO: 11.8 %
NEUTROPHILS # BLD AUTO: 4.14 K/UL
NEUTROPHILS NFR BLD AUTO: 55 %
NITRITE URINE: NEGATIVE
NONHDLC SERPL-MCNC: 120 MG/DL
PH URINE: 6
PLATELET # BLD AUTO: 271 K/UL
POTASSIUM SERPL-SCNC: 4.2 MMOL/L
PROT SERPL-MCNC: 7.4 G/DL
PROTEIN URINE: ABNORMAL
RBC # BLD: 4.67 M/UL
RBC # FLD: 13.4 %
SODIUM SERPL-SCNC: 140 MMOL/L
SPECIFIC GRAVITY URINE: 1.03
TIBC SERPL-MCNC: 275 UG/DL
TRIGL SERPL-MCNC: 95 MG/DL
TSH SERPL-ACNC: 2.19 UIU/ML
UIBC SERPL-MCNC: 241 UG/DL
UROBILINOGEN URINE: ABNORMAL
VIT B12 SERPL-MCNC: 559 PG/ML
WBC # FLD AUTO: 7.54 K/UL

## 2022-01-24 ENCOUNTER — NON-APPOINTMENT (OUTPATIENT)
Age: 52
End: 2022-01-24

## 2022-02-03 ENCOUNTER — APPOINTMENT (OUTPATIENT)
Dept: INTERNAL MEDICINE | Facility: CLINIC | Age: 52
End: 2022-02-03

## 2022-02-04 ENCOUNTER — NON-APPOINTMENT (OUTPATIENT)
Age: 52
End: 2022-02-04

## 2022-02-10 ENCOUNTER — APPOINTMENT (OUTPATIENT)
Dept: INTERNAL MEDICINE | Facility: CLINIC | Age: 52
End: 2022-02-10
Payer: MEDICARE

## 2022-02-10 ENCOUNTER — LABORATORY RESULT (OUTPATIENT)
Age: 52
End: 2022-02-10

## 2022-02-10 VITALS
TEMPERATURE: 97.2 F | SYSTOLIC BLOOD PRESSURE: 121 MMHG | OXYGEN SATURATION: 95 % | WEIGHT: 222 LBS | HEART RATE: 95 BPM | BODY MASS INDEX: 32.78 KG/M2 | DIASTOLIC BLOOD PRESSURE: 80 MMHG

## 2022-02-10 DIAGNOSIS — M79.10 MYALGIA, UNSPECIFIED SITE: ICD-10-CM

## 2022-02-10 DIAGNOSIS — E53.8 DEFICIENCY OF OTHER SPECIFIED B GROUP VITAMINS: ICD-10-CM

## 2022-02-10 DIAGNOSIS — Z86.79 PERSONAL HISTORY OF OTHER DISEASES OF THE CIRCULATORY SYSTEM: ICD-10-CM

## 2022-02-10 DIAGNOSIS — R10.9 UNSPECIFIED ABDOMINAL PAIN: ICD-10-CM

## 2022-02-10 PROCEDURE — 99215 OFFICE O/P EST HI 40 MIN: CPT | Mod: 25

## 2022-02-10 PROCEDURE — 93000 ELECTROCARDIOGRAM COMPLETE: CPT

## 2022-02-10 PROCEDURE — 36415 COLL VENOUS BLD VENIPUNCTURE: CPT

## 2022-02-11 LAB
ALBUMIN SERPL ELPH-MCNC: 4.5 G/DL
ALP BLD-CCNC: 79 U/L
ALT SERPL-CCNC: 7 U/L
ANION GAP SERPL CALC-SCNC: 14 MMOL/L
APPEARANCE: ABNORMAL
AST SERPL-CCNC: 14 U/L
BASOPHILS # BLD AUTO: 0.05 K/UL
BASOPHILS NFR BLD AUTO: 0.9 %
BILIRUB SERPL-MCNC: 0.3 MG/DL
BILIRUBIN URINE: NEGATIVE
BLOOD URINE: NORMAL
BUN SERPL-MCNC: 13 MG/DL
CALCIUM SERPL-MCNC: 9.8 MG/DL
CHLORIDE SERPL-SCNC: 104 MMOL/L
CK SERPL-CCNC: 90 U/L
CO2 SERPL-SCNC: 24 MMOL/L
COLOR: YELLOW
CREAT SERPL-MCNC: 0.73 MG/DL
CRP SERPL-MCNC: 13 MG/L
EOSINOPHIL # BLD AUTO: 0.08 K/UL
EOSINOPHIL NFR BLD AUTO: 1.4 %
ERYTHROCYTE [SEDIMENTATION RATE] IN BLOOD BY WESTERGREN METHOD: 49 MM/HR
GLUCOSE QUALITATIVE U: NEGATIVE
GLUCOSE SERPL-MCNC: 102 MG/DL
HCT VFR BLD CALC: 39.2 %
HGB BLD-MCNC: 11.9 G/DL
IMM GRANULOCYTES NFR BLD AUTO: 0.2 %
KETONES URINE: NEGATIVE
LEUKOCYTE ESTERASE URINE: NEGATIVE
LYMPHOCYTES # BLD AUTO: 2.2 K/UL
LYMPHOCYTES NFR BLD AUTO: 37.6 %
MAN DIFF?: NORMAL
MCHC RBC-ENTMCNC: 27.2 PG
MCHC RBC-ENTMCNC: 30.4 GM/DL
MCV RBC AUTO: 89.7 FL
MONOCYTES # BLD AUTO: 0.6 K/UL
MONOCYTES NFR BLD AUTO: 10.3 %
NEUTROPHILS # BLD AUTO: 2.91 K/UL
NEUTROPHILS NFR BLD AUTO: 49.6 %
NITRITE URINE: NEGATIVE
PH URINE: 5.5
PLATELET # BLD AUTO: 279 K/UL
POTASSIUM SERPL-SCNC: 4.2 MMOL/L
PROT SERPL-MCNC: 7.5 G/DL
PROTEIN URINE: NORMAL
RBC # BLD: 4.37 M/UL
RBC # FLD: 14.3 %
SODIUM SERPL-SCNC: 141 MMOL/L
SPECIFIC GRAVITY URINE: 1.03
UROBILINOGEN URINE: NORMAL
WBC # FLD AUTO: 5.85 K/UL

## 2022-02-14 ENCOUNTER — NON-APPOINTMENT (OUTPATIENT)
Age: 52
End: 2022-02-14

## 2022-02-24 DIAGNOSIS — Z11.59 ENCOUNTER FOR SCREENING FOR OTHER VIRAL DISEASES: ICD-10-CM

## 2022-02-26 ENCOUNTER — NON-APPOINTMENT (OUTPATIENT)
Age: 52
End: 2022-02-26

## 2022-02-26 ENCOUNTER — APPOINTMENT (OUTPATIENT)
Dept: OTOLARYNGOLOGY | Facility: CLINIC | Age: 52
End: 2022-02-26
Payer: MEDICARE

## 2022-02-26 VITALS
TEMPERATURE: 97.3 F | HEIGHT: 69 IN | BODY MASS INDEX: 33.18 KG/M2 | SYSTOLIC BLOOD PRESSURE: 113 MMHG | DIASTOLIC BLOOD PRESSURE: 74 MMHG | OXYGEN SATURATION: 99 % | WEIGHT: 224 LBS | HEART RATE: 68 BPM

## 2022-02-26 PROCEDURE — 99215 OFFICE O/P EST HI 40 MIN: CPT

## 2022-02-26 NOTE — HISTORY OF PRESENT ILLNESS
[de-identified] : Since 3/14, she has experienced 30 minutes to nine days of a feeling of spinning. During this time she also has trouble with ambulation. Nothing makes these better or worse and motion doesn't provoke the issue. Denies hearing loss or tinnitus. A sister has similar issues. Has been dx w/ migraines & is managed by an [facility] neurologist (Dr. Goode) with a working differential (per old notes) of functional dizziness vs. MAV.  \par Also w/ involuntary eye opening/closing with body shaking.

## 2022-02-26 NOTE — DATA REVIEWED
[de-identified] : VNG 11/14: R ear weak on calorics\par VNG 3/15: R ear weak on calorics\par 5/17: normal hearing; type Ad AD, A AS\par 6/17: nl rotary chair testing [de-identified] : CTA brain 8/15: normal\par MRI 9/15: R vestibule enhancement\par MRI 3/16: normal

## 2022-02-26 NOTE — PHYSICAL EXAM
[Hearing Loss Right Only] : normal [Hearing Loss Left Only] : normal [Rinne Test Air Conduction Persists > Bone Conduction Right] : air conduction greater than bone conduction on the right [Hearing Dash Test (Tuning Fork On Forehead)] : no lateralization of tone [Rinne Test Air Conduction Persists > Bone Conduction Left] : air conduction greater than bone conduction on the left [Nystagmus] : ~T no ~M nystagmus was seen [Fistula Sign] : Fistula Sign: Negative [de-identified] : some corrective saccades with smooth pursuit [de-identified] : uses walker [FreeTextEntry1] : unable to test Rhomberg, gait, Xiao [Normal] : mucosa is normal

## 2022-02-26 NOTE — ASSESSMENT
[FreeTextEntry1] : We extensively discussed her condition and reviewed the results of prior testing; she is understandably frustrated at the lack of progress that she's made. Given her lack of improvement over time and with vestibular therapy coupled with the normal rotary chair testing results referenced in my earlier notes (actual results are missing from the chart and we will be asking for these from [facility]), I agree with her neurologist that the pt doesn't seem to have a peripheral source of her vertigo. She may consider asking her neuro about a prophylactic migraine med trial. \par Discussed reasons to RTC

## 2022-04-11 PROBLEM — Z11.59 SCREENING FOR VIRAL DISEASE: Status: ACTIVE | Noted: 2022-02-24

## 2022-04-13 ENCOUNTER — APPOINTMENT (OUTPATIENT)
Dept: ORTHOPEDIC SURGERY | Facility: CLINIC | Age: 52
End: 2022-04-13
Payer: MEDICARE

## 2022-04-13 DIAGNOSIS — M48.02 SPINAL STENOSIS, CERVICAL REGION: ICD-10-CM

## 2022-04-13 LAB
COVID-19 NUCLEOCAPSID  GAM ANTIBODY INTERPRETATION: POSITIVE
COVID-19 SPIKE DOMAIN ANTIBODY INTERPRETATION: POSITIVE
SARS-COV-2 AB SERPL IA-ACNC: >250 U/ML
SARS-COV-2 AB SERPL QL IA: 41.3 INDEX

## 2022-04-13 PROCEDURE — 99213 OFFICE O/P EST LOW 20 MIN: CPT

## 2022-04-13 NOTE — DISCUSSION/SUMMARY
[de-identified] : multilevel foraminal stenosis cervical with cervical radiculopathy; has had pt with some relief.  still with 4-5/10 pain.  recommend JOSE trial.  seeing dr. swenson today.  follow up in 3 months.

## 2022-04-13 NOTE — PHYSICAL EXAM
[de-identified] : Physical Exam:\par \par General: patient is well developed, well nourished, in no acute \par distress, alert and oriented x 3. \par \par Mood and affect: normal\par \par Respiratory: no respiratory distress noted\par \par Skin: no scars over spine, skin intact, no erythema, increased warmth\par \par Alignment:The spine is well compensated in the coronal and sagittal plane. \par \par Gait: The patient is able to toe walk and heel walk without difficulty. \par \par Palpation: no tenderness to palpation cervical spine or paraspinal region\par \par Range of motion: Cervical spine ROM is restricted\par \par Neurologic Exam:\par Motor: Manual Muscle testing in the upper and lower extremities is 5 out of 5 in all muscle groups. There is no evidence of muscular atrophy in the upper extremities. Sensory: Sensation to light touch is grossly intact in the upper and lower extremities\par \par Reflexes: DTR are present and symmetric throughout, negative toscano bilaterally, negative inverted radial reflex bilaterally, no clonus, plantar responses are flexor\par \par Special tests: Spurlings sign positive on left.  Lhermitte's sign is absent. . [de-identified] : MRI cervical 10/2021: C3/C4 small left sided disc herniation and facet artrosis with severe left and moderate right foramianl stenosis; no cord compression or cord signal change; C4/5 moderate bilateral foraminal steonsis; C5/6: no significant stenosis; C6/7: left disc osteophyte with some compression of the left c7 root.\par \par XR AP/lateral cervical 9/2021: mild multilevel degenerative changes, no spondylolisthesis, no fractures seen.

## 2022-04-13 NOTE — HISTORY OF PRESENT ILLNESS
[de-identified] : Follow up 4/13/22: having neck pain to the left shoulder, more recently over the past 3 weeks, down to the left thumb.  in PT.  pain level initially 8/10, now 4-5/10 on average.  tried duloxetine, but was allergic. no injections to date.  has pain management physician, Dr. Felipe Andrews, has appt for today.\par \par Follow up 11/3/21: Patient presents for follow up. Denies new neurologic symptoms. Here to review imaging. \par \par Initial 10/20/21: Ms. SCHMIDT is a very pleasant 51 year old female who complains of chronic neck pain, worse over the past 2 months, onset while sleeping. Patient states pain originates in left shoulder/trapezius and radiates to the left side of her neck. Pain on onset was 8/10 severity, now 5/10 severity. Denies pain that radiates to upper extremities. Denies upper extremity numbness/weakness/paresthesias. Saw pain management and neurology.\par \par The patient no history of previous spine surgery.\par \par The patient has no history of unexpected weight loss, no history of active cancer, no history bladder or bowel dysfunction, no night pain, no fevers or chills.\par \par The past medical history, surgical history, family history, allergies, medications, 10+ point review of systems, family history and social history were reviewed and non contributory.\par \par

## 2022-04-16 ENCOUNTER — EMERGENCY (EMERGENCY)
Facility: HOSPITAL | Age: 52
LOS: 1 days | Discharge: HOME | End: 2022-04-16
Attending: EMERGENCY MEDICINE | Admitting: EMERGENCY MEDICINE
Payer: MEDICARE

## 2022-04-16 VITALS
HEART RATE: 72 BPM | SYSTOLIC BLOOD PRESSURE: 130 MMHG | TEMPERATURE: 98 F | RESPIRATION RATE: 17 BRPM | DIASTOLIC BLOOD PRESSURE: 79 MMHG | OXYGEN SATURATION: 100 %

## 2022-04-16 VITALS
SYSTOLIC BLOOD PRESSURE: 148 MMHG | RESPIRATION RATE: 18 BRPM | TEMPERATURE: 98 F | OXYGEN SATURATION: 96 % | HEIGHT: 70 IN | HEART RATE: 87 BPM | WEIGHT: 229.06 LBS | DIASTOLIC BLOOD PRESSURE: 63 MMHG

## 2022-04-16 DIAGNOSIS — M25.551 PAIN IN RIGHT HIP: ICD-10-CM

## 2022-04-16 DIAGNOSIS — M25.552 PAIN IN LEFT HIP: ICD-10-CM

## 2022-04-16 DIAGNOSIS — Z88.1 ALLERGY STATUS TO OTHER ANTIBIOTIC AGENTS STATUS: ICD-10-CM

## 2022-04-16 DIAGNOSIS — M54.50 LOW BACK PAIN, UNSPECIFIED: ICD-10-CM

## 2022-04-16 DIAGNOSIS — Z87.42 PERSONAL HISTORY OF OTHER DISEASES OF THE FEMALE GENITAL TRACT: ICD-10-CM
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
## 2022-04-16 DIAGNOSIS — Z87.19 PERSONAL HISTORY OF OTHER DISEASES OF THE DIGESTIVE SYSTEM: ICD-10-CM

## 2022-04-16 DIAGNOSIS — M51.36 OTHER INTERVERTEBRAL DISC DEGENERATION, LUMBAR REGION: ICD-10-CM

## 2022-04-16 DIAGNOSIS — I34.1 NONRHEUMATIC MITRAL (VALVE) PROLAPSE: ICD-10-CM

## 2022-04-16 DIAGNOSIS — Z88.8 ALLERGY STATUS TO OTHER DRUGS, MEDICAMENTS AND BIOLOGICAL SUBSTANCES: ICD-10-CM
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
## 2022-04-16 DIAGNOSIS — M25.571 PAIN IN RIGHT ANKLE AND JOINTS OF RIGHT FOOT: ICD-10-CM

## 2022-04-16 DIAGNOSIS — Z98.89 OTHER SPECIFIED POSTPROCEDURAL STATES: Chronic | ICD-10-CM

## 2022-04-16 DIAGNOSIS — M79.671 PAIN IN RIGHT FOOT: ICD-10-CM

## 2022-04-16 DIAGNOSIS — M25.562 PAIN IN LEFT KNEE: ICD-10-CM

## 2022-04-16 DIAGNOSIS — Z90.711 ACQUIRED ABSENCE OF UTERUS WITH REMAINING CERVICAL STUMP: ICD-10-CM

## 2022-04-16 DIAGNOSIS — Z88.5 ALLERGY STATUS TO NARCOTIC AGENT: ICD-10-CM

## 2022-04-16 DIAGNOSIS — M79.672 PAIN IN LEFT FOOT: ICD-10-CM
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
## 2022-04-16 DIAGNOSIS — M25.572 PAIN IN LEFT ANKLE AND JOINTS OF LEFT FOOT: ICD-10-CM

## 2022-04-16 DIAGNOSIS — M25.561 PAIN IN RIGHT KNEE: ICD-10-CM

## 2022-04-16 PROCEDURE — 73521 X-RAY EXAM HIPS BI 2 VIEWS: CPT

## 2022-04-16 PROCEDURE — 72100 X-RAY EXAM L-S SPINE 2/3 VWS: CPT | Mod: 26

## 2022-04-16 PROCEDURE — 73564 X-RAY EXAM KNEE 4 OR MORE: CPT

## 2022-04-16 PROCEDURE — 73630 X-RAY EXAM OF FOOT: CPT | Mod: 26,LT,RT

## 2022-04-16 PROCEDURE — 73610 X-RAY EXAM OF ANKLE: CPT

## 2022-04-16 PROCEDURE — 96372 THER/PROPH/DIAG INJ SC/IM: CPT

## 2022-04-16 PROCEDURE — 72170 X-RAY EXAM OF PELVIS: CPT | Mod: 26,59

## 2022-04-16 PROCEDURE — 72100 X-RAY EXAM L-S SPINE 2/3 VWS: CPT

## 2022-04-16 PROCEDURE — 73565 X-RAY EXAM OF KNEES: CPT | Mod: 26

## 2022-04-16 PROCEDURE — 73502 X-RAY EXAM HIP UNI 2-3 VIEWS: CPT | Mod: 26

## 2022-04-16 PROCEDURE — 99284 EMERGENCY DEPT VISIT MOD MDM: CPT | Mod: FS,25

## 2022-04-16 PROCEDURE — 99284 EMERGENCY DEPT VISIT MOD MDM: CPT | Mod: 25

## 2022-04-16 PROCEDURE — 73610 X-RAY EXAM OF ANKLE: CPT | Mod: 26,50

## 2022-04-16 PROCEDURE — 73630 X-RAY EXAM OF FOOT: CPT | Mod: 26,50

## 2022-04-16 PROCEDURE — 73522 X-RAY EXAM HIPS BI 3-4 VIEWS: CPT

## 2022-04-16 PROCEDURE — 73610 X-RAY EXAM OF ANKLE: CPT | Mod: 26,LT,RT

## 2022-04-16 PROCEDURE — 72170 X-RAY EXAM OF PELVIS: CPT

## 2022-04-16 PROCEDURE — 73630 X-RAY EXAM OF FOOT: CPT

## 2022-04-16 PROCEDURE — 73564 X-RAY EXAM KNEE 4 OR MORE: CPT | Mod: 26,50

## 2022-04-16 PROCEDURE — 73521 X-RAY EXAM HIPS BI 2 VIEWS: CPT | Mod: 26

## 2022-04-16 RX ORDER — GABAPENTIN 400 MG/1
300 CAPSULE ORAL ONCE
Refills: 0 | Status: COMPLETED | OUTPATIENT
Start: 2022-04-16 | End: 2022-04-16

## 2022-04-16 RX ORDER — CYCLOBENZAPRINE HYDROCHLORIDE 10 MG/1
10 TABLET, FILM COATED ORAL ONCE
Refills: 0 | Status: COMPLETED | OUTPATIENT
Start: 2022-04-16 | End: 2022-04-16

## 2022-04-16 RX ORDER — CYCLOBENZAPRINE HYDROCHLORIDE 10 MG/1
5 TABLET, FILM COATED ORAL ONCE
Refills: 0 | Status: COMPLETED | OUTPATIENT
Start: 2022-04-16 | End: 2022-04-16

## 2022-04-16 RX ORDER — ACETAMINOPHEN 500 MG
2 TABLET ORAL
Qty: 30 | Refills: 0
Start: 2022-04-16 | End: 2022-04-20

## 2022-04-16 RX ORDER — ACETAMINOPHEN 500 MG
1000 TABLET ORAL ONCE
Refills: 0 | Status: COMPLETED | OUTPATIENT
Start: 2022-04-16 | End: 2022-04-16

## 2022-04-16 RX ORDER — KETOROLAC TROMETHAMINE 30 MG/ML
30 SYRINGE (ML) INJECTION ONCE
Refills: 0 | Status: DISCONTINUED | OUTPATIENT
Start: 2022-04-16 | End: 2022-04-16

## 2022-04-16 RX ORDER — ACETAMINOPHEN 500 MG
975 TABLET ORAL ONCE
Refills: 0 | Status: COMPLETED | OUTPATIENT
Start: 2022-04-16 | End: 2022-04-16

## 2022-04-16 RX ADMIN — CYCLOBENZAPRINE HYDROCHLORIDE 10 MILLIGRAM(S): 10 TABLET, FILM COATED ORAL at 13:25

## 2022-04-16 RX ADMIN — Medication 975 MILLIGRAM(S): at 18:49

## 2022-04-16 RX ADMIN — Medication 1000 MILLIGRAM(S): at 11:22

## 2022-04-16 RX ADMIN — GABAPENTIN 300 MILLIGRAM(S): 400 CAPSULE ORAL at 13:25

## 2022-04-16 RX ADMIN — CYCLOBENZAPRINE HYDROCHLORIDE 5 MILLIGRAM(S): 10 TABLET, FILM COATED ORAL at 21:08

## 2022-04-16 RX ADMIN — GABAPENTIN 300 MILLIGRAM(S): 400 CAPSULE ORAL at 21:08

## 2022-04-16 RX ADMIN — Medication 30 MILLIGRAM(S): at 13:26

## 2022-04-16 NOTE — ED PROVIDER NOTE - NSFOLLOWUPINSTRUCTIONS_ED_ALL_ED_FT
Please continue meds as needed for pain and follow up with your PMD/ortho. Return to the Emergency Department if you have any new or worsening symptoms, or if you have any concerns.    Please reach out to Muriel Mejia ([facility] ED clinical referral coordinator) to assist you with your follow-up appointment.     Monday - Friday 11am-7pm  [phone]  [email or web address]        Musculoskeletal Pain      Musculoskeletal pain refers to aches and pains in your bones, joints, muscles, and the tissues that surround them. This pain can occur in any part of the body. It can last for a short time (acute) or a long time (chronic).    A physical exam, lab tests, and imaging studies may be done to find the cause of your musculoskeletal pain.      Follow these instructions at home:    Lifestyle   •Try to control or lower your stress levels. Stress increases muscle tension and can worsen musculoskeletal pain. It is important to recognize when you are anxious or stressed and learn ways to manage it. This may include:  •Meditation or yoga.      •Cognitive or behavioral therapy.      •Acupuncture or massage therapy.        •You may continue all activities unless the activities cause more pain. When the pain gets better, slowly resume your normal activities. Gradually increase the intensity and duration of your activities or exercise.        Managing pain, stiffness, and swelling                   •Treatment may include medicines for pain and inflammation that are taken by mouth or applied to the skin. Take over-the-counter and prescription medicines only as told by your health care provider.      •When your pain is severe, bed rest may be helpful. Lie or sit in any position that is comfortable, but get out of bed and walk around at least every couple of hours.    •If directed, apply heat to the affected area as often as told by your health care provider. Use the heat source that your health care provider recommends, such as a moist heat pack or a heating pad.  •Place a towel between your skin and the heat source.      •Leave the heat on for 20–30 minutes.      •Remove the heat if your skin turns bright red. This is especially important if you are unable to feel pain, heat, or cold. You may have a greater risk of getting burned.      •If directed, put ice on the painful area. To do this:  •Put ice in a plastic bag.      •Place a towel between your skin and the bag.      •Leave the ice on for 20 minutes, 2–3 times a day.      •Remove the ice if your skin turns bright red. This is very important. If you cannot feel pain, heat, or cold, you have a greater risk of damage to the area.        General instructions     •Your health care provider may recommend that you see a physical therapist. This person can help you come up with a safe exercise program.      •If told by your health care provider, do physical therapy exercises to improve movement and strength in the affected area.      •Keep all follow-up visits. This is important. This includes any physical therapy visits.        Contact a health care provider if:    •Your pain gets worse.      •Medicines do not help ease your pain.      •You cannot use the part of your body that hurts, such as your arm, leg, or neck.      •You have trouble sleeping.      •You have trouble doing your normal activities.        Get help right away if:    •You have a new injury and your pain is worse or different.      •You feel numb or you have tingling in the painful area.        Summary    •Musculoskeletal pain refers to aches and pains in your bones, joints, muscles, and the tissues that surround them.      •This pain can occur in any part of the body.      •Your health care provider may recommend that you see a physical therapist. This person can help you come up with a safe exercise program. Do any exercises as told by your physical therapist.      •Lower your stress level. Stress can worsen musculoskeletal pain. Ways to lower stress may include meditation, yoga, cognitive or behavioral therapy, acupuncture, and massage therapy.      This information is not intended to replace advice given to you by your health care provider. Make sure you discuss any questions you have with your health care provider.      Document Revised: 04/22/2021 Document Reviewed: 03/31/2021    Elsevier Patient Education © 2022 [employer]

## 2022-04-16 NOTE — ED ADULT TRIAGE NOTE - DOMESTIC TRAVEL HIGH RISK QUESTION
No
seroquel see interval and summary data for updates  trazodone for insomnia
seroquel see interval and summary data for updates
seroquel see interval and summary data for updates  trazodone for insomnia
seroquel see interval and summary data for updates
seroquel see interval and summary data for updates  trazodone for insomnia
seroquel see interval and summary data for updates  trazodone for insomnia

## 2022-04-16 NOTE — ED PROVIDER NOTE - ATTENDING CONTRIBUTION TO CARE
52 yo female h/o Lumbar DJD and HNP s/p mult back procedures (followed by pain mgmt), GERD, MVP, Rheumatic Fever and PSHx of partial hysterectomy and S/P arthroscopic knee surgery c/o mechanical fall on the street when she didn't see something in the street while walking.  Pt fell forwards and broke fall w hands and knees but felt a severe jolt in her entire body, now has pain "everywhere." Pt has been able to ambulate since the fall. No meds pta for pain.  No head injury, loc, HA, dizziness, chest pain, sob, difficulty breathing, abd pain, n/v, new numbness/weakness in ext, skin breaks, bleeding, neck pain, saddle anesthesia, incontinence  Pain worse w moving.  Well appearing, nad, nc/at, lung cta, heart reg, abd soft, nt, ext no gross deformity, neck and back nontender midline, no gross neuro deficits Pt w mechanical fall now having body pain.  I suspect pt w exacerbation of underlying back issues due to fall; no neuro deficits to warrant emergent imaging.  Low concern for fx based on exam. Plan xray, pain meds; reassess.

## 2022-04-16 NOTE — ED PROVIDER NOTE - MDM ORDERS SUBMITTED SELECTION
Imaging Studies/Medications
Principal Discharge DX:	Cholelithiasis  Secondary Diagnosis:	Umbilical hernia

## 2022-04-16 NOTE — ED ADULT NURSE NOTE - NSIMPLEMENTINTERV_GEN_ALL_ED
[de-identified] : cough and congestion x few days - failed hearing test at school today- hx of fever last week - low -grade today- went to urgent care last week due to dysuria was given abx- child does not tolerate medication well and did not complete full round 
Implemented All Fall Risk Interventions:  [place] to call system. Call bell, personal items and telephone within reach. Instruct patient to call for assistance. Room bathroom lighting operational. Non-slip footwear when patient is off stretcher. Physically safe environment: no spills, clutter or unnecessary equipment. Stretcher in lowest position, wheels locked, appropriate side rails in place. Provide visual cue, wrist band, yellow gown, etc. Monitor gait and stability. Monitor for mental status changes and reorient to person, place, and time. Review medications for side effects contributing to fall risk. Reinforce activity limits and safety measures with patient and family.

## 2022-04-16 NOTE — ED PROVIDER NOTE - MUSCULOSKELETAL, MLM
Spine appears normal, range of motion is not limited, no muscle or joint tenderness. No step offs. B/L LE WITH NEGATIVE HEEL STRIKE WITH KNEE WITH GOOD ROM AND NO LAXITY, GOOD ROM TO HIP WITHOUT TTP TO TROCHANTER,

## 2022-04-16 NOTE — ED PROVIDER NOTE - PATIENT PORTAL LINK FT
You can access the FollowMyHealth Patient Portal offered by [facility] by registering at the following website: [email or web address]. By joining [employer]’s FollowMyHealth portal, you will also be able to view your health information using other applications (apps) compatible with our system.

## 2022-04-16 NOTE — ED PROVIDER NOTE - OBJECTIVE STATEMENT
50 y/o female with a PMHx of Lumbar DJD and HNP, GERD, MVP, Rheumatic Fever and PSHx of partial hysterectomy and S/P arthroscopic knee surgery is here in the ED c/o 52 y/o female with a PMHx of Lumbar DJD and HNP, GERD, MVP, Rheumatic Fever and PSHx of partial hysterectomy and S/P arthroscopic knee surgery is here in the ED c/o b/l hip, knee, leg and foot pain s/p mechanical fall today. Pt states she was walking down the street when she tripped due to uneven pavement and fell forward and was able to break the fall with her wrist. She was assisted immediately off the floor by bystanders and has been able to ambulate since the fall. She denies taking anything for the pain and she denies LOC, head injury, HA, dizziness, chest pain, sob, difficulty breathing, skin breaks, bleeding, neck pain, loss of urine/bm, saddle anesthesia, numbness/tingling of extremities.

## 2022-04-16 NOTE — ED PROVIDER NOTE - CLINICAL SUMMARY MEDICAL DECISION MAKING FREE TEXT BOX
50 y/o female s/p mechanical fall here in the ED c/o lower back, hip, knee and foot pain. Pain treated and improved. The patient has xrays that are negative for acute fracture or dislocation; however, I did discuss with the patient the possibility of an occult or hairline fracture that is not immediately apparent on initial xray and that the patient will need follow up xrays within a week if pain persists; the patient does understand this.  The patient also has normal distal m/s/s and pulses; NVID; no ev of compartment syndrome or limb ischemia or neurovasc/nerve damage, and the patient was made aware of ssx that would be concerning for this and need for immediate return to ER.  I have discussed the discharge plan with the patient. The patient agrees with the plan, as discussed.  The patient understands Emergency Department diagnosis is a preliminary diagnosis often based on limited information and that the patient must adhere to the follow-up plan as discussed.  The patient understands that if the symptoms worsen or if prescribed medications do not have the desired/planned effect that the patient may return to the Emergency Department at any time for further evaluation and treatment.

## 2022-04-16 NOTE — ED ADULT NURSE NOTE - OBJECTIVE STATEMENT
51y female presents to ED s/p fall. Pt states she was getting off a bus and tripped, bracing her fall with hands. Endorsing pain to bilateral arms, lower back, and bilateral lower legs. Pt has brace on left ankle from previous fall weeks ago, has been evaluated by orthopedist and pain management. Pt ambulatory with cane. ROLDAN. No signs of bleeding/deformity. Denies head injury, LOC, loss of bowel/bladder continence, numbness/tingling. A&Ox4.

## 2022-04-27 ENCOUNTER — NON-APPOINTMENT (OUTPATIENT)
Age: 52
End: 2022-04-27

## 2022-04-28 ENCOUNTER — APPOINTMENT (OUTPATIENT)
Dept: INTERNAL MEDICINE | Facility: CLINIC | Age: 52
End: 2022-04-28
Payer: MEDICARE

## 2022-04-28 ENCOUNTER — NON-APPOINTMENT (OUTPATIENT)
Age: 52
End: 2022-04-28

## 2022-04-28 PROCEDURE — 99443: CPT | Mod: 95

## 2022-05-04 ENCOUNTER — RESULT REVIEW (OUTPATIENT)
Age: 52
End: 2022-05-04

## 2022-05-04 ENCOUNTER — APPOINTMENT (OUTPATIENT)
Dept: MRI IMAGING | Facility: CLINIC | Age: 52
End: 2022-05-04
Payer: MEDICARE

## 2022-05-04 ENCOUNTER — OUTPATIENT (OUTPATIENT)
Dept: OUTPATIENT SERVICES | Facility: HOSPITAL | Age: 52
LOS: 1 days | End: 2022-05-04

## 2022-05-04 ENCOUNTER — APPOINTMENT (OUTPATIENT)
Dept: MRI IMAGING | Facility: CLINIC | Age: 52
End: 2022-05-04

## 2022-05-04 ENCOUNTER — APPOINTMENT (OUTPATIENT)
Dept: ORTHOPEDIC SURGERY | Facility: CLINIC | Age: 52
End: 2022-05-04
Payer: MEDICARE

## 2022-05-04 DIAGNOSIS — Z98.89 OTHER SPECIFIED POSTPROCEDURAL STATES: Chronic | ICD-10-CM

## 2022-05-04 DIAGNOSIS — M54.9 DORSALGIA, UNSPECIFIED: ICD-10-CM

## 2022-05-04 PROCEDURE — 99213 OFFICE O/P EST LOW 20 MIN: CPT

## 2022-05-04 PROCEDURE — G1004: CPT

## 2022-05-04 PROCEDURE — 72146 MRI CHEST SPINE W/O DYE: CPT | Mod: 26,ME

## 2022-05-04 PROCEDURE — 73721 MRI JNT OF LWR EXTRE W/O DYE: CPT | Mod: 26,LT,MH

## 2022-05-05 ENCOUNTER — NON-APPOINTMENT (OUTPATIENT)
Age: 52
End: 2022-05-05

## 2022-05-13 ENCOUNTER — APPOINTMENT (OUTPATIENT)
Dept: INTERNAL MEDICINE | Facility: CLINIC | Age: 52
End: 2022-05-13
Payer: MEDICARE

## 2022-05-13 VITALS
DIASTOLIC BLOOD PRESSURE: 82 MMHG | BODY MASS INDEX: 32.88 KG/M2 | TEMPERATURE: 97.2 F | HEART RATE: 94 BPM | SYSTOLIC BLOOD PRESSURE: 132 MMHG | OXYGEN SATURATION: 100 % | WEIGHT: 222 LBS | HEIGHT: 69 IN
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
## 2022-05-13 PROCEDURE — 99214 OFFICE O/P EST MOD 30 MIN: CPT

## 2022-05-16 NOTE — PHYSICAL EXAM
[de-identified] : General: patient is well developed, well nourished, in no acute \par distress, alert and oriented x 3. \par \par Mood and affect: normal\par \par Respiratory: no respiratory distress noted\par \par Alignment:The spine is well compensated in the coronal and sagittal plane. \par \par Gait: The patient is able to toe walk and heel walk without difficulty. \par \par Palpation: no tenderness to palpation spine or paraspinal region\par \par Range of motion: Lumbar spine ROM is restricted\par \par Neurologic Exam:\par Motor: Manual Muscle testing in the lower extremities is 5 out of 5 in all muscle groups. There is no evidence of muscular atrophy in the lower extremities. Sensory: Sensation to light touch is grossly intact in the lower extremities\par \par Hip Exam: No pain with internal or external rotation of hips bilaterally\par \par Special tests: Straight leg raise test negative. Cross straight leg test negative.  [de-identified] : XR Lumbar spine AP/Lateral 4/16/22 (my read): Degenerative disc disease of the lumbar spine, particularly of L2/3/4/5/S1. There is no spondylolisthesis, no fracture seen. There is significant facet arthropathy seen, multilevel.\par \par MRI cervical 10/2021: C3/C4 small left sided disc herniation and facet arthrosis with severe left and moderate right foraminal stenosis; no cord compression or cord signal change; C4/5 moderate bilateral foraminal stenosis; C5/6: no significant stenosis; C6/7: left disc osteophyte with some compression of the left C7 root.\par \par XR AP/lateral cervical 9/2021: mild multilevel degenerative changes, no spondylolisthesis, no fractures seen.

## 2022-05-16 NOTE — ADDENDUM
[FreeTextEntry1] : I, Shirley Potter, assisted with documentation on 05/06/2022  acting as scribe for Dr. Fernando Mosquera.

## 2022-05-16 NOTE — DISCUSSION/SUMMARY
[de-identified] : Diagnosis: degenerative disc disease, lower back pain\par \par I discussed with the patient that I would recommend further evaluation with imaging of her lumbar spine. I have ordered a lumbar MRI noncontrast and we will call her with the results of the MRI. She will continue to follow up with Dr. Andrews of pain management. I discussed with her that Dr. Andrews should be the sole person performing oral pain management in her case and I would not want to cause conflict in this regard.

## 2022-06-28 ENCOUNTER — APPOINTMENT (OUTPATIENT)
Dept: ORTHOPEDIC SURGERY | Facility: CLINIC | Age: 52
End: 2022-06-28

## 2022-06-29 ENCOUNTER — APPOINTMENT (OUTPATIENT)
Dept: INTERNAL MEDICINE | Facility: CLINIC | Age: 52
End: 2022-06-29

## 2022-06-29 ENCOUNTER — NON-APPOINTMENT (OUTPATIENT)
Age: 52
End: 2022-06-29

## 2022-06-29 PROCEDURE — 99443: CPT | Mod: 95

## 2022-07-06 ENCOUNTER — APPOINTMENT (OUTPATIENT)
Dept: ORTHOPEDIC SURGERY | Facility: CLINIC | Age: 52
End: 2022-07-06

## 2022-07-06 DIAGNOSIS — M51.9 UNSPECIFIED THORACIC, THORACOLUMBAR AND LUMBOSACRAL INTERVERTEBRAL DISC DISORDER: ICD-10-CM

## 2022-07-06 DIAGNOSIS — M50.90 CERVICAL DISC DISORDER, UNSPECIFIED, UNSPECIFIED CERVICAL REGION: ICD-10-CM

## 2022-07-06 DIAGNOSIS — M54.2 CERVICALGIA: ICD-10-CM

## 2022-07-06 DIAGNOSIS — G89.29 CERVICALGIA: ICD-10-CM

## 2022-07-06 PROCEDURE — 99213 OFFICE O/P EST LOW 20 MIN: CPT

## 2022-07-11 PROBLEM — M54.2 CHRONIC NECK PAIN: Status: ACTIVE | Noted: 2021-10-20

## 2022-07-11 PROBLEM — M51.9 THORACIC DISC DISEASE: Status: ACTIVE | Noted: 2022-07-11

## 2022-07-11 PROBLEM — M50.90 CERVICAL DISC DISEASE: Status: ACTIVE | Noted: 2022-07-11

## 2022-07-18 NOTE — PHYSICAL EXAM
[de-identified] : General: patient is well developed, well nourished, in no acute \par distress, alert and oriented x 3. \par \par Mood and affect: normal\par \par Respiratory: no respiratory distress noted\par \par Alignment:The spine is well compensated in the coronal and sagittal plane. \par \par Gait: The patient is able to toe walk and heel walk without difficulty. \par \par Palpation: no tenderness to palpation spine or paraspinal region\par \par Range of motion: Lumbar spine ROM is restricted\par \par Neurologic Exam:\par Motor: Manual Muscle testing in the lower extremities is 5 out of 5 in all muscle groups. There is no evidence of muscular atrophy in the lower extremities. Sensory: Sensation to light touch is grossly intact in the lower extremities\par \par Hip Exam: No pain with internal or external rotation of hips bilaterally\par \par Special tests: Straight leg raise test negative. Cross straight leg test negative.  [de-identified] : MRI thoracic spine 5/4/22 (my read): multilevel thoracic and lower cervical disc degeneration, no spinal cord compression, no significant neurocompressive lesion\par \par XR Lumbar spine AP/Lateral 4/16/22 (my read): Degenerative disc disease of the lumbar spine, particularly of L2/3/4/5/S1. There is no spondylolisthesis, no fracture seen. There is significant facet arthropathy seen, multilevel.\par \par MRI cervical 10/2021: C3/C4 small left sided disc herniation and facet arthrosis with severe left and moderate right foraminal stenosis; no cord compression or cord signal change; C4/5 moderate bilateral foraminal stenosis; C5/6: no significant stenosis; C6/7: left disc osteophyte with some compression of the left C7 root.\par \par XR AP/lateral cervical 9/2021: mild multilevel degenerative changes, no spondylolisthesis, no fractures seen.

## 2022-07-18 NOTE — HISTORY OF PRESENT ILLNESS
[de-identified] : Follow up 7/6/22: Patient presents for follow up. She continues to have mid and low back pain but primary symptom is left-sided nonradiating low back pain. She also continues to have left-sided neck pain that radiates down her left upper extremity to her thumb, which has mildly improved since last. Denies new neurologic symptoms. Here to review imaging.\par \par Follow up 5/4/22: Patient returns for follow up, this time complaining of low back pain. She continues to follow with Dr. Andrews for pain management and has been undergoing injections in her cervical sine with Dr. Vasquez. She reports severe pain of her lower back. She denies any weakness or numbness in the lower extremities.\par \par Follow up 4/13/22: having neck pain to the left shoulder, more recently over the past 3 weeks, down to the left thumb.  in PT.  pain level initially 8/10, now 4-5/10 on average.  tried duloxetine, but was allergic. no injections to date.  has pain management physician, Dr. Felipe Andrews, has appt for today.\par \par Follow up 11/3/21: Patient presents for follow up. Denies new neurologic symptoms. Here to review imaging. \par \par Initial 10/20/21: Ms. SCHMIDT is a very pleasant 51 year old female who complains of chronic neck pain, worse over the past 2 months, onset while sleeping. Patient states pain originates in left shoulder/trapezius and radiates to the left side of her neck. Pain on onset was 8/10 severity, now 5/10 severity. Denies pain that radiates to upper extremities. Denies upper extremity numbness/weakness/paresthesias. Saw pain management and neurology.\par \par The patient no history of previous spine surgery.\par \par The patient has no history of unexpected weight loss, no history of active cancer, no history bladder or bowel dysfunction, no night pain, no fevers or chills.\par \par The past medical history, surgical history, family history, allergies, medications, 10+ point review of systems, family history and social history were reviewed and non contributory.\par \par

## 2022-07-18 NOTE — DISCUSSION/SUMMARY
[de-identified] : Diagnosis: cervical spine pain, mid back pain, lower back pain, cervical disc disease, thoracic disc disease\par \par Discussed my findings with the patient. I have recommended that she complete an MRI lumbar spine without contrast, order given. She will follow up with me to formulate treatment plan after imaging has been completed, sooner if there is an issue. All questions answered.

## 2022-07-28 ENCOUNTER — APPOINTMENT (OUTPATIENT)
Dept: DERMATOLOGY | Facility: CLINIC | Age: 52
End: 2022-07-28

## 2022-09-30 ENCOUNTER — APPOINTMENT (OUTPATIENT)
Dept: INTERNAL MEDICINE | Facility: CLINIC | Age: 52
End: 2022-09-30

## 2022-09-30 DIAGNOSIS — G43.909 MIGRAINE, UNSPECIFIED, NOT INTRACTABLE, W/OUT STATUS MIGRAINOSUS: ICD-10-CM

## 2022-09-30 DIAGNOSIS — Z82.49 FAMILY HISTORY OF ISCHEMIC HEART DISEASE AND OTHER DISEASES OF THE CIRCULATORY SYSTEM: ICD-10-CM

## 2022-09-30 DIAGNOSIS — F43.21 ADJUSTMENT DISORDER WITH DEPRESSED MOOD: ICD-10-CM

## 2022-09-30 PROCEDURE — 99443: CPT | Mod: 95

## 2022-10-04 ENCOUNTER — RESULT REVIEW (OUTPATIENT)
Age: 52
End: 2022-10-04

## 2022-10-04 ENCOUNTER — OUTPATIENT (OUTPATIENT)
Dept: OUTPATIENT SERVICES | Facility: HOSPITAL | Age: 52
LOS: 1 days | End: 2022-10-04

## 2022-10-04 ENCOUNTER — APPOINTMENT (OUTPATIENT)
Dept: MRI IMAGING | Facility: CLINIC | Age: 52
End: 2022-10-04

## 2022-10-04 DIAGNOSIS — Z98.89 OTHER SPECIFIED POSTPROCEDURAL STATES: Chronic | ICD-10-CM

## 2022-10-04 PROCEDURE — 72148 MRI LUMBAR SPINE W/O DYE: CPT | Mod: 26,MH

## 2022-11-11 ENCOUNTER — NON-APPOINTMENT (OUTPATIENT)
Age: 52
End: 2022-11-11

## 2022-11-11 RX ORDER — AMOXICILLIN AND CLAVULANATE POTASSIUM 875; 125 MG/1; MG/1
875-125 TABLET, COATED ORAL
Qty: 14 | Refills: 0 | Status: DISCONTINUED | COMMUNITY
Start: 2022-06-30 | End: 2022-11-11

## 2022-11-11 RX ORDER — FAMOTIDINE 20 MG/1
20 TABLET, FILM COATED ORAL TWICE DAILY
Refills: 0 | Status: ACTIVE | COMMUNITY
Start: 2021-08-10

## 2022-11-11 RX ORDER — KRILL/OM-3/DHA/EPA/PHOSPHO/AST 1000-230MG
81 CAPSULE ORAL
Refills: 0 | Status: ACTIVE | COMMUNITY
Start: 2022-11-11

## 2022-11-18 ENCOUNTER — NON-APPOINTMENT (OUTPATIENT)
Age: 52
End: 2022-11-18

## 2022-11-18 ENCOUNTER — EMERGENCY (EMERGENCY)
Facility: HOSPITAL | Age: 52
LOS: 1 days | Discharge: HOME | End: 2022-11-18
Attending: STUDENT IN AN ORGANIZED HEALTH CARE EDUCATION/TRAINING PROGRAM | Admitting: STUDENT IN AN ORGANIZED HEALTH CARE EDUCATION/TRAINING PROGRAM
Payer: MEDICARE

## 2022-11-18 VITALS
DIASTOLIC BLOOD PRESSURE: 84 MMHG | HEIGHT: 69 IN | HEART RATE: 102 BPM | TEMPERATURE: 98 F | WEIGHT: 220.02 LBS | OXYGEN SATURATION: 100 % | SYSTOLIC BLOOD PRESSURE: 138 MMHG | RESPIRATION RATE: 18 BRPM

## 2022-11-18 VITALS
DIASTOLIC BLOOD PRESSURE: 84 MMHG | HEART RATE: 80 BPM | SYSTOLIC BLOOD PRESSURE: 134 MMHG | RESPIRATION RATE: 17 BRPM | OXYGEN SATURATION: 99 % | TEMPERATURE: 98 F

## 2022-11-18 DIAGNOSIS — Z88.8 ALLERGY STATUS TO OTHER DRUGS, MEDICAMENTS AND BIOLOGICAL SUBSTANCES: ICD-10-CM

## 2022-11-18 DIAGNOSIS — Z98.89 OTHER SPECIFIED POSTPROCEDURAL STATES: Chronic | ICD-10-CM

## 2022-11-18 DIAGNOSIS — M51.36 OTHER INTERVERTEBRAL DISC DEGENERATION, LUMBAR REGION: ICD-10-CM

## 2022-11-18 DIAGNOSIS — Z87.39 PERSONAL HISTORY OF OTHER DISEASES OF THE MUSCULOSKELETAL SYSTEM AND CONNECTIVE TISSUE: ICD-10-CM

## 2022-11-18 DIAGNOSIS — Z87.828 PERSONAL HISTORY OF OTHER (HEALED) PHYSICAL INJURY AND TRAUMA: ICD-10-CM

## 2022-11-18 DIAGNOSIS — R10.31 RIGHT LOWER QUADRANT PAIN: ICD-10-CM

## 2022-11-18 DIAGNOSIS — Z88.5 ALLERGY STATUS TO NARCOTIC AGENT: ICD-10-CM

## 2022-11-18 DIAGNOSIS — U07.1 COVID-19: ICD-10-CM

## 2022-11-18 DIAGNOSIS — K21.9 GASTRO-ESOPHAGEAL REFLUX DISEASE WITHOUT ESOPHAGITIS: ICD-10-CM

## 2022-11-18 DIAGNOSIS — R09.81 NASAL CONGESTION: ICD-10-CM

## 2022-11-18 DIAGNOSIS — M25.572 PAIN IN LEFT ANKLE AND JOINTS OF LEFT FOOT: ICD-10-CM

## 2022-11-18 DIAGNOSIS — M25.522 PAIN IN LEFT ELBOW: ICD-10-CM

## 2022-11-18 DIAGNOSIS — Z90.710 ACQUIRED ABSENCE OF BOTH CERVIX AND UTERUS: ICD-10-CM

## 2022-11-18 DIAGNOSIS — I34.1 NONRHEUMATIC MITRAL (VALVE) PROLAPSE: ICD-10-CM

## 2022-11-18 DIAGNOSIS — Z88.1 ALLERGY STATUS TO OTHER ANTIBIOTIC AGENTS STATUS: ICD-10-CM

## 2022-11-18 DIAGNOSIS — Z87.42 PERSONAL HISTORY OF OTHER DISEASES OF THE FEMALE GENITAL TRACT: ICD-10-CM

## 2022-11-18 LAB
ALBUMIN SERPL ELPH-MCNC: 4.4 G/DL — SIGNIFICANT CHANGE UP (ref 3.3–5)
ALP SERPL-CCNC: 82 U/L — SIGNIFICANT CHANGE UP (ref 40–120)
ALT FLD-CCNC: 10 U/L — SIGNIFICANT CHANGE UP (ref 10–45)
ANION GAP SERPL CALC-SCNC: 9 MMOL/L — SIGNIFICANT CHANGE UP (ref 5–17)
AST SERPL-CCNC: 15 U/L — SIGNIFICANT CHANGE UP (ref 10–40)
BASOPHILS # BLD AUTO: 0.03 K/UL — SIGNIFICANT CHANGE UP (ref 0–0.2)
BASOPHILS NFR BLD AUTO: 0.4 % — SIGNIFICANT CHANGE UP (ref 0–2)
BILIRUB SERPL-MCNC: 0.2 MG/DL — SIGNIFICANT CHANGE UP (ref 0.2–1.2)
BUN SERPL-MCNC: 11 MG/DL — SIGNIFICANT CHANGE UP (ref 7–23)
CALCIUM SERPL-MCNC: 10.3 MG/DL — SIGNIFICANT CHANGE UP (ref 8.4–10.5)
CHLORIDE SERPL-SCNC: 100 MMOL/L — SIGNIFICANT CHANGE UP (ref 96–108)
CO2 SERPL-SCNC: 29 MMOL/L — SIGNIFICANT CHANGE UP (ref 22–31)
CREAT SERPL-MCNC: 0.79 MG/DL — SIGNIFICANT CHANGE UP (ref 0.5–1.3)
EGFR: 89 ML/MIN/1.73M2 — SIGNIFICANT CHANGE UP
EOSINOPHIL # BLD AUTO: 0.1 K/UL — SIGNIFICANT CHANGE UP (ref 0–0.5)
EOSINOPHIL NFR BLD AUTO: 1.4 % — SIGNIFICANT CHANGE UP (ref 0–6)
FLUAV AG NPH QL: SIGNIFICANT CHANGE UP
FLUBV AG NPH QL: SIGNIFICANT CHANGE UP
GLUCOSE SERPL-MCNC: 106 MG/DL — HIGH (ref 70–99)
HCT VFR BLD CALC: 41.6 % — SIGNIFICANT CHANGE UP (ref 34.5–45)
HGB BLD-MCNC: 13.5 G/DL — SIGNIFICANT CHANGE UP (ref 11.5–15.5)
IMM GRANULOCYTES NFR BLD AUTO: 0.3 % — SIGNIFICANT CHANGE UP (ref 0–0.9)
LYMPHOCYTES # BLD AUTO: 2.9 K/UL — SIGNIFICANT CHANGE UP (ref 1–3.3)
LYMPHOCYTES # BLD AUTO: 39.3 % — SIGNIFICANT CHANGE UP (ref 13–44)
MCHC RBC-ENTMCNC: 26.5 PG — LOW (ref 27–34)
MCHC RBC-ENTMCNC: 32.5 GM/DL — SIGNIFICANT CHANGE UP (ref 32–36)
MCV RBC AUTO: 81.6 FL — SIGNIFICANT CHANGE UP (ref 80–100)
MONOCYTES # BLD AUTO: 0.75 K/UL — SIGNIFICANT CHANGE UP (ref 0–0.9)
MONOCYTES NFR BLD AUTO: 10.2 % — SIGNIFICANT CHANGE UP (ref 2–14)
NEUTROPHILS # BLD AUTO: 3.58 K/UL — SIGNIFICANT CHANGE UP (ref 1.8–7.4)
NEUTROPHILS NFR BLD AUTO: 48.4 % — SIGNIFICANT CHANGE UP (ref 43–77)
NRBC # BLD: 0 /100 WBCS — SIGNIFICANT CHANGE UP (ref 0–0)
PLATELET # BLD AUTO: 295 K/UL — SIGNIFICANT CHANGE UP (ref 150–400)
POTASSIUM SERPL-MCNC: 4.2 MMOL/L — SIGNIFICANT CHANGE UP (ref 3.5–5.3)
POTASSIUM SERPL-SCNC: 4.2 MMOL/L — SIGNIFICANT CHANGE UP (ref 3.5–5.3)
PROT SERPL-MCNC: 8.2 G/DL — SIGNIFICANT CHANGE UP (ref 6–8.3)
RBC # BLD: 5.1 M/UL — SIGNIFICANT CHANGE UP (ref 3.8–5.2)
RBC # FLD: 13.2 % — SIGNIFICANT CHANGE UP (ref 10.3–14.5)
RSV RNA NPH QL NAA+NON-PROBE: SIGNIFICANT CHANGE UP
SARS-COV-2 RNA SPEC QL NAA+PROBE: DETECTED
SODIUM SERPL-SCNC: 138 MMOL/L — SIGNIFICANT CHANGE UP (ref 135–145)
WBC # BLD: 7.38 K/UL — SIGNIFICANT CHANGE UP (ref 3.8–10.5)
WBC # FLD AUTO: 7.38 K/UL — SIGNIFICANT CHANGE UP (ref 3.8–10.5)

## 2022-11-18 PROCEDURE — 96374 THER/PROPH/DIAG INJ IV PUSH: CPT

## 2022-11-18 PROCEDURE — 80053 COMPREHEN METABOLIC PANEL: CPT

## 2022-11-18 PROCEDURE — 71046 X-RAY EXAM CHEST 2 VIEWS: CPT | Mod: 26

## 2022-11-18 PROCEDURE — 87637 SARSCOV2&INF A&B&RSV AMP PRB: CPT

## 2022-11-18 PROCEDURE — 85025 COMPLETE CBC W/AUTO DIFF WBC: CPT

## 2022-11-18 PROCEDURE — 71046 X-RAY EXAM CHEST 2 VIEWS: CPT

## 2022-11-18 PROCEDURE — 99284 EMERGENCY DEPT VISIT MOD MDM: CPT | Mod: CS

## 2022-11-18 PROCEDURE — 99285 EMERGENCY DEPT VISIT HI MDM: CPT | Mod: 25

## 2022-11-18 PROCEDURE — 36415 COLL VENOUS BLD VENIPUNCTURE: CPT

## 2022-11-18 RX ORDER — PSEUDOEPHEDRINE HCL 30 MG
30 TABLET ORAL ONCE
Refills: 0 | Status: COMPLETED | OUTPATIENT
Start: 2022-11-18 | End: 2022-11-18

## 2022-11-18 RX ORDER — IPRATROPIUM BROMIDE 21 MCG
2 AEROSOL, SPRAY (ML) NASAL
Qty: 1 | Refills: 0
Start: 2022-11-18

## 2022-11-18 RX ORDER — IBUPROFEN 200 MG
1 TABLET ORAL
Qty: 56 | Refills: 0
Start: 2022-11-18 | End: 2022-12-01

## 2022-11-18 RX ORDER — KETOROLAC TROMETHAMINE 30 MG/ML
30 SYRINGE (ML) INJECTION ONCE
Refills: 0 | Status: DISCONTINUED | OUTPATIENT
Start: 2022-11-18 | End: 2022-11-18

## 2022-11-18 RX ORDER — ACETAMINOPHEN 500 MG
2 TABLET ORAL
Qty: 80 | Refills: 0
Start: 2022-11-18 | End: 2022-11-27

## 2022-11-18 RX ORDER — SODIUM CHLORIDE 9 MG/ML
1000 INJECTION INTRAMUSCULAR; INTRAVENOUS; SUBCUTANEOUS ONCE
Refills: 0 | Status: COMPLETED | OUTPATIENT
Start: 2022-11-18 | End: 2022-11-18

## 2022-11-18 RX ADMIN — Medication 30 MILLIGRAM(S): at 19:42

## 2022-11-18 RX ADMIN — SODIUM CHLORIDE 1000 MILLILITER(S): 9 INJECTION INTRAMUSCULAR; INTRAVENOUS; SUBCUTANEOUS at 18:13

## 2022-11-18 NOTE — ED PROVIDER NOTE - PHYSICAL EXAMINATION
GENERAL: no acute distress; well-developed  HEAD:  Atraumatic, Normocephalic  EYES: EOMI, PERRLA, conjunctiva and sclera clear  ENT: MMM; oropharynx clear  NECK: Supple, No JVD  CHEST/LUNG: Clear to auscultation bilaterally; No wheeze  HEART: Regular rate and rhythm; No murmurs, rubs, or gallops  ABDOMEN: Soft, Nontender, Nondistended; Bowel sounds present  EXTREMITIES:  2+ Peripheral Pulses, No clubbing, cyanosis, or edema  PSYCH: AAOx3  NEUROLOGY: no focal motor or sensory deficits. 5/5 muscle strength in all extremities.   SKIN: No rashes or lesions

## 2022-11-18 NOTE — ED PROVIDER NOTE - PROGRESS NOTE DETAILS
No infiltrate noted on imaging. Will treat congestion symptomatically with pseudoephedrine & ipratropium spray- the later sent to her pharmacy No infiltrate noted on imaging. Will treat congestion symptomatically with pseudoephedrine & ipratropium spray- the latter sent to her pharmacy

## 2022-11-18 NOTE — ED PROVIDER NOTE - PATIENT PORTAL LINK FT
You can access the FollowMyHealth Patient Portal offered by [facility] by registering at the following website: [email or web address]. By joining [employer]’s FollowMyHealth portal, you will also be able to view your health information using other applications (apps) compatible with our system.

## 2022-11-18 NOTE — ED PROVIDER NOTE - CROS ED ENMT ALL NEG
Discharge Planning Assessment  [facility]     Patient Name: Belen Allen  MRN: [id number]  Today's Date: 6/1/2021    Admit Date: 5/31/2021    Discharge Needs Assessment     Row Name 06/01/21 1923       Living Environment    Lives With  child(manuel), dependent;spouse    Name(s) of Who Lives With Patient  lives with fiance and 8 and 10 year old children    Current Living Arrangements  home/apartment/condo    Primary Care Provided by  self    Provides Primary Care For  child(manuel)    Family Caregiver if Needed  spouse    Family Caregiver Names  Significant other, Ced Aquino [phone]    Quality of Family Relationships  helpful;involved       Resource/Environmental Concerns    Resource/Environmental Concerns  none    Transportation Concerns  car, none       Transition Planning    Patient/Family Anticipates Transition to  home with family    Patient/Family Anticipated Services at Transition  none    Transportation Anticipated  family or friend will provide       Discharge Needs Assessment    Readmission Within the Last 30 Days  no previous admission in last 30 days    Equipment Currently Used at Home  none    Concerns to be Addressed  denies needs/concerns at this time;no discharge needs identified    Anticipated Changes Related to Illness  none    Equipment Needed After Discharge  none    Provided Post Acute Provider List?  N/A    N/A Provider List Comment  patient denies discharge planning needs at this time        Discharge Plan     Row Name 06/01/21 1926       Plan    Plan  Home via fiance. Denies needs    Patient/Family in Agreement with Plan  yes    Plan Comments  CCP met with patient at bedside. Introduced self, explained role, and verified face sheet. Patient sees Sahil Cornelius for PCP and fills prescriptions at [facility] in [facility]. Patient lives in an apartment with her firayne and 2 children ages 8 and 10. Patient states there are about 9 steps to enter her apartment and she does sometimes have  issues, but that there is a partial hand rail and her fiancé can assist her as needed. There are no stairs to access her bedroom. Patient reports she is IADL and drives. She has no DME, HH, or SNF history. She denies any discharge planning needs. She plans to return home via her fiancé at discharge who can assist her as needed. Lyn Carlson RN/CCP        Continued Care and Services - Admitted Since 5/31/2021    Coordination has not been started for this encounter.       Expected Discharge Date and Time     Expected Discharge Date Expected Discharge Time    Jun 2, 2021         Demographic Summary     Row Name 06/01/21 1923       General Information    Admission Type  inpatient    Arrived From  home    Referral Source  admission list    Reason for Consult  discharge planning    Preferred Language  English        Functional Status     Row Name 06/01/21 1923       Functional Status    Usual Activity Tolerance  moderate    Current Activity Tolerance  poor       Functional Status, IADL    Medications  independent    Meal Preparation  independent    Housekeeping  independent    Laundry  independent    Shopping  independent        Psychosocial    No documentation.       Abuse/Neglect    No documentation.       Legal    No documentation.       Substance Abuse    No documentation.       Patient Forms    No documentation.           Christiana Carlson     - - -

## 2022-11-18 NOTE — ED PROVIDER NOTE - CLINICAL SUMMARY MEDICAL DECISION MAKING FREE TEXT BOX
53 y/o female with a PMHx of Lumbar DJD, GERD, MVP, Rheumatic Fever and PSHx of partial hysterectomy and S/P arthroscopic knee surgery presenting with concern for pneumonia after recently being treated for Covid-19 with increased productive cough and nasal congestion.   Will check CXR, screening labs, and Flu/RSV to concurrent viral infection  No sinus tenderness to suggest bacterial sinusitis.   No chest pain or shortness of breath   Nonspecific abdominal pain ongoing for > 1 week. 53 y/o female with a PMHx of Lumbar DJD, GERD, MVP, Rheumatic Fever and PSHx of partial hysterectomy and S/P arthroscopic knee surgery presenting with concern for pneumonia after recently being treated for Covid-19 with increased productive cough and nasal congestion.   Will check CXR, screening labs, and Flu/RSV to concurrent viral infection  No sinus tenderness to suggest bacterial sinusitis.   No chest pain or shortness of breath   Nonspecific abdominal pain ongoing for > 1 week. Non-tender on exam.

## 2022-11-18 NOTE — ED PROVIDER NOTE - NSFOLLOWUPINSTRUCTIONS_ED_ALL_ED_FT
Take Tylenol 650 mg every 6-8 hours or Motrin 600 mg every 8 hours as need for pain  Use Sudafed (pseudoephedrine) as needed for congestion and ipratropium spray. Take Tylenol 650 mg every 6-8 hours or Motrin 600 mg every 8 hours as need for pain  Use Sudafed (pseudoephedrine) as needed for congestion and ipratropium spray.  Follow up with your primary care doctor.

## 2022-11-18 NOTE — ED ADULT NURSE NOTE - NSFALLRSKHARMRISK_ED_ALL_ED
Post-Care Instructions: I reviewed with the patient in detail post-care instructions. Patient is to wear sunprotection, and avoid picking at any of the treated lesions. Pt may apply Vaseline to crusted or scabbing areas. Render Post-Care Instructions In Note?: yes Render In Bullet Format When Appropriate: No Duration Of Freeze Thaw-Cycle (Seconds): 0 Consent: The patient's consent was obtained including but not limited to risks of crusting, scabbing, blistering, scarring, darker or lighter pigmentary change, recurrence, incomplete removal and infection. Detail Level: Zone Total Number Of Aks Treated: 2 no

## 2022-11-18 NOTE — ED PROVIDER NOTE - OBJECTIVE STATEMENT
51 y/o female with a PMHx of Lumbar DJD, GERD, MVP, Rheumatic Fever and PSHx of partial hysterectomy and S/P arthroscopic knee surgery presenting with concern for pneumonia.     Patient was diagnosed w/Covid-19 on 11/9 and finished five day course of Paxlovid, but is still experiencing nasal congestion and productive cough with yellow/green phlegm. She denies any fevers/chills (notes had some at beginning of infection). She was taking Mucinex, but stopped due to nausea that it caused.   Denies any diarrhea. She notes some R inguinal pain that has been going on since before 11/9- not worsening or changing in character and also notes nonspecific muscle pain in left elbow and L ankle after she suffered orthopedic injury last month. Reports sore throat. No chest pain or shortness of breath. 51 y/o female with a PMHx of Lumbar DJD, GERD, MVP, Rheumatic Fever and PSHx of partial hysterectomy and S/P arthroscopic knee surgery presenting with concern for pneumonia.     Patient was diagnosed w/Covid-19 on 11/9 and finished five day course of Paxlovid, but is still experiencing nasal congestion and productive cough with yellow/green phlegm. She denies any fevers/chills (notes had some at beginning of infection). She was taking Mucinex, but stopped due to nausea that it caused.   Denies any diarrhea. She notes some R inguinal pain that has been going on since before 11/9- not worsening or changing in character and also notes nonspecific muscle pain in left elbow and L ankle after she suffered orthopedic injury last month. Reports sore throat. No chest pain or shortness of breath. No nausea/vomiting/diarrhea. No history of ovarian cysts.

## 2022-11-18 NOTE — ED ADULT NURSE NOTE - OBJECTIVE STATEMENT
Pt. a&ox4 ambulatory with steady gait, hx of rheumatic fever, heart murmur, covid in 2020, comes to ED after testing covid+ 11/9, for continuing productive cough with yellow mucus, secondary SOB, and post-tussive left sided chest discomfort, nonradiating. Denies f/c since day 1-2 after testing positive. Denies palpitations, HA, n/v/d, abdominal pain. Airway patent, breathing is spontaneous and unlabored, speaking in clear coherent sentences. Pt. breathing becomes slightly labored when coughing up mucus.

## 2022-11-18 NOTE — ED ADULT TRIAGE NOTE - CHIEF COMPLAINT QUOTE
Pt c/o yellow phlegm and L sided chest discomfort x1wk. +covid 11/9-- took Paxlovid last dose yesterday. Sent in by Dr. Blas for r/o PNA. Denies fevers, chills, n/v, SOB.

## 2022-11-19 RX ORDER — ACETAMINOPHEN 500 MG
2 TABLET ORAL
Qty: 50 | Refills: 0
Start: 2022-11-19

## 2022-11-19 RX ORDER — PSEUDOEPHEDRINE HCL 30 MG
1 TABLET ORAL
Qty: 20 | Refills: 0
Start: 2022-11-19

## 2022-11-19 RX ORDER — IBUPROFEN 200 MG
1 TABLET ORAL
Qty: 50 | Refills: 0
Start: 2022-11-19

## 2022-12-02 ENCOUNTER — APPOINTMENT (OUTPATIENT)
Dept: INTERNAL MEDICINE | Facility: CLINIC | Age: 52
End: 2022-12-02

## 2022-12-02 DIAGNOSIS — R53.83 OTHER FATIGUE: ICD-10-CM

## 2022-12-02 DIAGNOSIS — Z87.898 PERSONAL HISTORY OF OTHER SPECIFIED CONDITIONS: ICD-10-CM

## 2022-12-02 PROCEDURE — 99443: CPT | Mod: 95

## 2022-12-02 RX ORDER — BLOOD-GLUCOSE METER
KIT MISCELLANEOUS
Qty: 1 | Refills: 0 | Status: ACTIVE | COMMUNITY
Start: 2022-12-02 | End: 1900-01-01

## 2022-12-04 PROBLEM — R53.83 FATIGUE: Status: ACTIVE | Noted: 2022-12-04

## 2022-12-04 PROBLEM — Z87.898 HISTORY OF FATIGUE: Status: RESOLVED | Noted: 2022-12-02 | Resolved: 2022-12-04

## 2022-12-13 ENCOUNTER — APPOINTMENT (OUTPATIENT)
Dept: INTERNAL MEDICINE | Facility: CLINIC | Age: 52
End: 2022-12-13

## 2022-12-13 VITALS
SYSTOLIC BLOOD PRESSURE: 120 MMHG | WEIGHT: 228 LBS | DIASTOLIC BLOOD PRESSURE: 80 MMHG | HEIGHT: 69 IN | TEMPERATURE: 97.2 F | BODY MASS INDEX: 33.77 KG/M2 | HEART RATE: 69 BPM | OXYGEN SATURATION: 100 %

## 2022-12-13 DIAGNOSIS — M79.602 PAIN IN LEFT ARM: ICD-10-CM

## 2022-12-13 DIAGNOSIS — U07.1 COVID-19: ICD-10-CM

## 2022-12-13 PROCEDURE — 99215 OFFICE O/P EST HI 40 MIN: CPT | Mod: CS

## 2022-12-13 RX ORDER — OMEGA-3/DHA/EPA/FISH OIL 35-113.5MG
1000 TABLET,CHEWABLE ORAL DAILY
Qty: 90 | Refills: 1 | Status: ACTIVE | COMMUNITY
Start: 2019-04-10 | End: 1900-01-01

## 2022-12-13 RX ORDER — NIRMATRELVIR AND RITONAVIR 300-100 MG
20 X 150 MG & KIT ORAL
Qty: 1 | Refills: 0 | Status: DISCONTINUED | COMMUNITY
Start: 2022-11-11 | End: 2022-12-13

## 2022-12-14 ENCOUNTER — NON-APPOINTMENT (OUTPATIENT)
Age: 52
End: 2022-12-14

## 2023-01-11 ENCOUNTER — APPOINTMENT (OUTPATIENT)
Dept: MRI IMAGING | Facility: CLINIC | Age: 53
End: 2023-01-11

## 2023-02-27 ENCOUNTER — EMERGENCY (EMERGENCY)
Facility: HOSPITAL | Age: 53
LOS: 1 days | Discharge: HOME | End: 2023-02-27
Attending: EMERGENCY MEDICINE | Admitting: EMERGENCY MEDICINE
Payer: MEDICARE

## 2023-02-27 VITALS
DIASTOLIC BLOOD PRESSURE: 82 MMHG | RESPIRATION RATE: 18 BRPM | OXYGEN SATURATION: 99 % | TEMPERATURE: 99 F | HEART RATE: 85 BPM | SYSTOLIC BLOOD PRESSURE: 138 MMHG

## 2023-02-27 VITALS
HEART RATE: 77 BPM | WEIGHT: 233.91 LBS | OXYGEN SATURATION: 100 % | DIASTOLIC BLOOD PRESSURE: 78 MMHG | HEIGHT: 69 IN | SYSTOLIC BLOOD PRESSURE: 146 MMHG | TEMPERATURE: 98 F | RESPIRATION RATE: 18 BRPM

## 2023-02-27 DIAGNOSIS — Z98.89 OTHER SPECIFIED POSTPROCEDURAL STATES: Chronic | ICD-10-CM

## 2023-02-27 LAB
ALBUMIN SERPL ELPH-MCNC: 4.3 G/DL — SIGNIFICANT CHANGE UP (ref 3.3–5)
ALP SERPL-CCNC: 88 U/L — SIGNIFICANT CHANGE UP (ref 40–120)
ALT FLD-CCNC: 9 U/L — LOW (ref 10–45)
ANION GAP SERPL CALC-SCNC: 11 MMOL/L — SIGNIFICANT CHANGE UP (ref 5–17)
APTT BLD: 34.2 SEC — SIGNIFICANT CHANGE UP (ref 27.5–35.5)
AST SERPL-CCNC: 15 U/L — SIGNIFICANT CHANGE UP (ref 10–40)
BASOPHILS # BLD AUTO: 0.03 K/UL — SIGNIFICANT CHANGE UP (ref 0–0.2)
BASOPHILS NFR BLD AUTO: 0.5 % — SIGNIFICANT CHANGE UP (ref 0–2)
BILIRUB SERPL-MCNC: 0.2 MG/DL — SIGNIFICANT CHANGE UP (ref 0.2–1.2)
BUN SERPL-MCNC: 14 MG/DL — SIGNIFICANT CHANGE UP (ref 7–23)
CALCIUM SERPL-MCNC: 9.9 MG/DL — SIGNIFICANT CHANGE UP (ref 8.4–10.5)
CHLORIDE SERPL-SCNC: 100 MMOL/L — SIGNIFICANT CHANGE UP (ref 96–108)
CK MB CFR SERPL CALC: <1 NG/ML — SIGNIFICANT CHANGE UP (ref 0–6.7)
CK SERPL-CCNC: 87 U/L — SIGNIFICANT CHANGE UP (ref 25–170)
CO2 SERPL-SCNC: 27 MMOL/L — SIGNIFICANT CHANGE UP (ref 22–31)
CREAT SERPL-MCNC: 0.75 MG/DL — SIGNIFICANT CHANGE UP (ref 0.5–1.3)
EGFR: 95 ML/MIN/1.73M2 — SIGNIFICANT CHANGE UP
EOSINOPHIL # BLD AUTO: 0.1 K/UL — SIGNIFICANT CHANGE UP (ref 0–0.5)
EOSINOPHIL NFR BLD AUTO: 1.6 % — SIGNIFICANT CHANGE UP (ref 0–6)
GLUCOSE SERPL-MCNC: 87 MG/DL — SIGNIFICANT CHANGE UP (ref 70–99)
HCT VFR BLD CALC: 39 % — SIGNIFICANT CHANGE UP (ref 34.5–45)
HGB BLD-MCNC: 12.9 G/DL — SIGNIFICANT CHANGE UP (ref 11.5–15.5)
IMM GRANULOCYTES NFR BLD AUTO: 0.2 % — SIGNIFICANT CHANGE UP (ref 0–0.9)
INR BLD: 1.06 — SIGNIFICANT CHANGE UP (ref 0.88–1.16)
LYMPHOCYTES # BLD AUTO: 2.48 K/UL — SIGNIFICANT CHANGE UP (ref 1–3.3)
LYMPHOCYTES # BLD AUTO: 39.8 % — SIGNIFICANT CHANGE UP (ref 13–44)
MCHC RBC-ENTMCNC: 27.2 PG — SIGNIFICANT CHANGE UP (ref 27–34)
MCHC RBC-ENTMCNC: 33.1 GM/DL — SIGNIFICANT CHANGE UP (ref 32–36)
MCV RBC AUTO: 82.3 FL — SIGNIFICANT CHANGE UP (ref 80–100)
MONOCYTES # BLD AUTO: 0.66 K/UL — SIGNIFICANT CHANGE UP (ref 0–0.9)
MONOCYTES NFR BLD AUTO: 10.6 % — SIGNIFICANT CHANGE UP (ref 2–14)
NEUTROPHILS # BLD AUTO: 2.95 K/UL — SIGNIFICANT CHANGE UP (ref 1.8–7.4)
NEUTROPHILS NFR BLD AUTO: 47.3 % — SIGNIFICANT CHANGE UP (ref 43–77)
NRBC # BLD: 0 /100 WBCS — SIGNIFICANT CHANGE UP (ref 0–0)
PLATELET # BLD AUTO: 266 K/UL — SIGNIFICANT CHANGE UP (ref 150–400)
POTASSIUM SERPL-MCNC: 4 MMOL/L — SIGNIFICANT CHANGE UP (ref 3.5–5.3)
POTASSIUM SERPL-SCNC: 4 MMOL/L — SIGNIFICANT CHANGE UP (ref 3.5–5.3)
PROT SERPL-MCNC: 7.6 G/DL — SIGNIFICANT CHANGE UP (ref 6–8.3)
PROTHROM AB SERPL-ACNC: 12.6 SEC — SIGNIFICANT CHANGE UP (ref 10.5–13.4)
RBC # BLD: 4.74 M/UL — SIGNIFICANT CHANGE UP (ref 3.8–5.2)
RBC # FLD: 13.1 % — SIGNIFICANT CHANGE UP (ref 10.3–14.5)
SARS-COV-2 RNA SPEC QL NAA+PROBE: NEGATIVE — SIGNIFICANT CHANGE UP
SODIUM SERPL-SCNC: 138 MMOL/L — SIGNIFICANT CHANGE UP (ref 135–145)
TROPONIN T SERPL-MCNC: 0.01 NG/ML — SIGNIFICANT CHANGE UP (ref 0–0.01)
WBC # BLD: 6.23 K/UL — SIGNIFICANT CHANGE UP (ref 3.8–10.5)
WBC # FLD AUTO: 6.23 K/UL — SIGNIFICANT CHANGE UP (ref 3.8–10.5)

## 2023-02-27 PROCEDURE — 84484 ASSAY OF TROPONIN QUANT: CPT

## 2023-02-27 PROCEDURE — 85610 PROTHROMBIN TIME: CPT

## 2023-02-27 PROCEDURE — 71045 X-RAY EXAM CHEST 1 VIEW: CPT

## 2023-02-27 PROCEDURE — 96375 TX/PRO/DX INJ NEW DRUG ADDON: CPT

## 2023-02-27 PROCEDURE — 82550 ASSAY OF CK (CPK): CPT

## 2023-02-27 PROCEDURE — 99285 EMERGENCY DEPT VISIT HI MDM: CPT | Mod: 25

## 2023-02-27 PROCEDURE — 36415 COLL VENOUS BLD VENIPUNCTURE: CPT

## 2023-02-27 PROCEDURE — 82553 CREATINE MB FRACTION: CPT

## 2023-02-27 PROCEDURE — 85730 THROMBOPLASTIN TIME PARTIAL: CPT

## 2023-02-27 PROCEDURE — 93005 ELECTROCARDIOGRAM TRACING: CPT

## 2023-02-27 PROCEDURE — 85025 COMPLETE CBC W/AUTO DIFF WBC: CPT

## 2023-02-27 PROCEDURE — 96374 THER/PROPH/DIAG INJ IV PUSH: CPT

## 2023-02-27 PROCEDURE — 80053 COMPREHEN METABOLIC PANEL: CPT

## 2023-02-27 PROCEDURE — 99285 EMERGENCY DEPT VISIT HI MDM: CPT | Mod: CS

## 2023-02-27 PROCEDURE — 87635 SARS-COV-2 COVID-19 AMP PRB: CPT

## 2023-02-27 PROCEDURE — 71045 X-RAY EXAM CHEST 1 VIEW: CPT | Mod: 26

## 2023-02-27 RX ORDER — FAMOTIDINE 10 MG/ML
20 INJECTION INTRAVENOUS ONCE
Refills: 0 | Status: COMPLETED | OUTPATIENT
Start: 2023-02-27 | End: 2023-02-27

## 2023-02-27 RX ORDER — SODIUM CHLORIDE 9 MG/ML
1000 INJECTION INTRAMUSCULAR; INTRAVENOUS; SUBCUTANEOUS ONCE
Refills: 0 | Status: COMPLETED | OUTPATIENT
Start: 2023-02-27 | End: 2023-02-27

## 2023-02-27 RX ADMIN — SODIUM CHLORIDE 1000 MILLILITER(S): 9 INJECTION INTRAMUSCULAR; INTRAVENOUS; SUBCUTANEOUS at 14:41

## 2023-02-27 RX ADMIN — Medication 125 MILLIGRAM(S): at 14:42

## 2023-02-27 RX ADMIN — FAMOTIDINE 20 MILLIGRAM(S): 10 INJECTION INTRAVENOUS at 14:42

## 2023-02-27 NOTE — ED PROVIDER NOTE - OBJECTIVE STATEMENT
51 y/o F with PMHx multiple allergies, migraine headaches, degenerative joint disease of l-spine, GERD, MVP, and partial hysterectomy presents to ED c/o throat and chest tightness after taking sumatriptan for the first time. Pt took sumatriptan for the first time 2 hours pta and developed symptoms 1 hour later. Pt takes zyrtec (only name brand, cannot be generic) and famotidine on a daily basis. She also caries an epipen but did not use it today. Denies hives or pruritis.

## 2023-02-27 NOTE — ED PROVIDER NOTE - DR. NAME
Addended by: GLENYS ROY on: 9/26/2022 11:42 AM     Modules accepted: Orders    
Jayla Desai (Attending)

## 2023-02-27 NOTE — ED PROVIDER NOTE - CLINICAL SUMMARY MEDICAL DECISION MAKING FREE TEXT BOX
51 y/o F with PMHx multiple allergies presents to ED with possible allergic reaction to sumatriptan. VSS. Airway is patent and lungs are clear. Will give pepcid, solumedrol, IV fluids, and reevaluate. 53 y/o F with PMHx multiple allergies presents to ED with possible allergic reaction to sumatriptan. VSS. Airway is patent and lungs are clear. Will give pepcid, solumedrol, IV fluids, and reevaluate.    Pt feeling better in ED.  Discussed with Dr. Devon Blas and ok with dc home.  Agrees with prednisone for +1-2 days.

## 2023-02-27 NOTE — ED ADULT NURSE NOTE - NSFALLRSKOUTCOME_ED_ALL_ED
Patient is a 7y4m old  Female who presents with a chief complaint of Acute vomiting to rule out obstruction (12 Mar 2020 11:57)      Interval History:  CRRT -100cc/hr. Urine output has been zero while on CRRT. Yesterday prior to starting CRRT tacro level was timed appropriately but was undetectable. Repeated overnight 3 hours delayed, but also undetectable. Yesterday afternoon tacro trough was sent and was 3.9 and tacro peak was 24.7. vEEG without any seizure activity.    MEDICATIONS  (STANDING):  ALBUTerol  Intermittent Nebulization - Peds 2.5 milliGRAM(s) Nebulizer every 8 hours  amLODIPine Oral Tab/Cap - Peds 5 milliGRAM(s) Oral <User Schedule>  baclofen Oral Liquid - Peds 15 milliGRAM(s) Oral every 8 hours  buDESOnide   for Nebulization - Peds 0.5 milliGRAM(s) Nebulizer every 12 hours  cannabidiol Oral Liquid - Peds 270 milliGRAM(s) Oral every 12 hours  chlorhexidine 0.12% Oral Liquid - Peds 15 milliLiter(s) Swish and Spit two times a day  cholecalciferol Oral Liquid - Peds 1200 Unit(s) Enteral Tube <User Schedule>  cloNIDine 0.2 mG/24Hr(s) Transdermal Patch - Peds 2 Patch Transdermal <User Schedule>  CRRT Treatment - Pediatric    <Continuous>  dexMEDEtomidine Infusion - Peds 1.7 MICROgram(s)/kG/Hr (15.3 mL/Hr) IV Continuous <Continuous>  epoetin mague Injection - Peds 3000 Unit(s) SubCutaneous <User Schedule>  eslicarbazepine Oral Tab/Cap - Peds 200 milliGRAM(s) Oral <User Schedule>  ferrous sulfate Oral Liquid - Peds 65 milliGRAM(s) Elemental Iron Enteral Tube <User Schedule>  heparin   Infusion for CRRT - Pediatric 10 Unit(s)/kG/Hr (0.9 mL/Hr) CRRT <Continuous>  lacosamide  Oral Liquid - Peds 200 milliGRAM(s) Oral two times a day  loperamide Oral Liquid - Peds 1 milliGRAM(s) Oral two times a day  mycophenolate mofetil  Oral Liquid - Peds 400 milliGRAM(s) Enteral Tube <User Schedule>  petrolatum, white/mineral oil Ophthalmic Ointment - Peds 1 Application(s) Both EYES two times a day  PrismaSATE Dialysate BGK 4 / 2.5 - Pediatric 5000 milliLiter(s) (1000 mL/Hr) CRRT <Continuous>  PrismaSOL Filtration BGK 4 / 2.5 - Pediatric 5000 milliLiter(s) (400 mL/Hr) CRRT <Continuous>  PrismaSOL Filtration BGK 4 / 2.5 - Pediatric 5000 milliLiter(s) (400 mL/Hr) CRRT <Continuous>  sodium chloride 3% for Nebulization - Peds 4 milliLiter(s) Nebulizer three times a day  tacrolimus  Oral Liquid - Peds 1.5 milliGRAM(s) Oral <User Schedule>    MEDICATIONS  (PRN):  acetaminophen   Oral Liquid - Peds. 400 milliGRAM(s) Oral every 4 hours PRN Temp greater or equal to 38 C (100.4 F), Moderate Pain (4 - 6), Severe Pain (7 - 10)  ALBUTerol  Intermittent Nebulization - Peds 2.5 milliGRAM(s) Nebulizer every 6 hours PRN Shortness of Breath and/or Wheezing  hydrALAZINE IV Intermittent - Peds 7 milliGRAM(s) IV Intermittent every 4 hours PRN BP >130/90  NIFEdipine Oral Liquid - Peds 7.5 milliGRAM(s) Oral every 4 hours PRN BP >130/90      Vital Signs Last 24 Hrs  T(C): 36.9 (12 Mar 2020 14:00), Max: 36.9 (12 Mar 2020 14:00)  T(F): 98.4 (12 Mar 2020 14:00), Max: 98.4 (12 Mar 2020 14:00)  HR: 69 (12 Mar 2020 14:00) (53 - 107)  BP: 109/62 (12 Mar 2020 14:00) (99/64 - 123/66)  BP(mean): 74 (12 Mar 2020 14:00) (63 - 101)  RR: 22 (12 Mar 2020 14:00) (15 - 28)  SpO2: 100% (12 Mar 2020 14:00) (97% - 100%)  I&O's Detail    11 Mar 2020 07:01  -  12 Mar 2020 07:00  --------------------------------------------------------  IN:    dexmedetomidine Infusion - Peds: 27 mL    dexmedetomidine Infusion - Peds: 205 mL    dexmedetomidine Infusion - Peds: 61.2 mL    dextrose 5% + sodium chloride 0.9%. - Pediatric: 85 mL    Enteral Tube Flush: 120 mL    IV PiggyBack: 13.5 mL    Suplena: 935 mL  Total IN: 1446.7 mL    OUT:    Ileostomy: 559 mL    Other: 1389 mL  Total OUT: 1948 mL    Total NET: -501.3 mL      12 Mar 2020 07:01  -  12 Mar 2020 14:18  --------------------------------------------------------  IN:    dexmedetomidine Infusion - Peds: 122.4 mL    Suplena: 336 mL  Total IN: 458.4 mL    OUT:    Ileostomy: 50 mL    Other: 631 mL  Total OUT: 681 mL    Total NET: -222.6 mL        Daily     Daily     Physical Exam  General: intermittently screaming and crying  HEENT: EEG leads in place, +facial edema, clear conjunctiva, dysconjugate eye movements  Heart: RRR, no murmur  Lungs: Vented, coarse breath sounds bilaterally  Abdomen: Soft, nontender, GT in place, ileostomy bag with stool  Extremities: Warm, no edema  Skin: no rashes or lesions  Neuro: Awake, nonverbal at baseline      Lab Results:                        9.5    16.68 )-----------( 225      ( 12 Mar 2020 08:51 )             31.7     12 Mar 2020 08:51    142    |  101    |  16     ----------------------------<  82     4.4     |  23     |  1.40   11 Mar 2020 09:30    137    |  96     |  34     ----------------------------<  85     4.4     |  22     |  2.16     Ca    10.3       12 Mar 2020 08:51  Ca    10.0       11 Mar 2020 09:30  Phos  2.0       12 Mar 2020 08:51  Phos  2.8       11 Mar 2020 09:30  Mg     2.8       12 Mar 2020 08:51  Mg     2.6       11 Mar 2020 09:30    TPro  7.5    /  Alb  4.1    /  TBili  < 0.2  /  DBili  x      /  AST  22     /  ALT  20     /  AlkPhos  141    08 Mar 2020 03:25  TPro  8.1    /  Alb  4.5    /  TBili  0.3    /  DBili  x      /  AST  26     /  ALT  21     /  AlkPhos  156    06 Mar 2020 08:00    LIVER FUNCTIONS - ( 08 Mar 2020 03:25 )  Alb: 4.1 g/dL / Pro: 7.5 g/dL / ALK PHOS: 141 u/L / ALT: 20 u/L / AST: 22 u/L / GGT: x         LIVER FUNCTIONS - ( 06 Mar 2020 08:00 )  Alb: 4.5 g/dL / Pro: 8.1 g/dL / ALK PHOS: 156 u/L / ALT: 21 u/L / AST: 26 u/L / GGT: x           PT/INR - ( 12 Mar 2020 08:51 )   PT: 12.5 SEC;   INR: 1.09          PTT - ( 12 Mar 2020 08:51 )  PTT:63.9 SEC  Urinalysis Basic - ( 11 Mar 2020 02:00 )    Color: RED / Appearance: Lt TURBID / S.015 / pH: 6.0  Gluc: NEGATIVE / Ketone: NEGATIVE  / Bili: NEGATIVE / Urobili: NORMAL   Blood: LARGE / Protein: 200 / Nitrite: NEGATIVE   Leuk Esterase: TRACE / RBC: >50 / WBC 11-25   Sq Epi: FEW / Non Sq Epi: x / Bacteria: NEGATIVE    Tacrolimus trough: 3.9    Tacrolimus peak: 24.7    Radiology: none Patient is a 7y4m old  Female who presents with a chief complaint of Acute vomiting to rule out obstruction (12 Mar 2020 11:57)      Interval History:  CRRT -100cc/hr. Urine output has been zero while on CRRT. Yesterday prior to starting CRRT tacro level was timed appropriately but was undetectable. Repeated overnight 3 hours delayed, but also undetectable. Yesterday afternoon tacro trough was sent and was 3.9 and tacro peak was 24.7. vEEG without any seizure activity.    MEDICATIONS  (STANDING):  ALBUTerol  Intermittent Nebulization - Peds 2.5 milliGRAM(s) Nebulizer every 8 hours  amLODIPine Oral Tab/Cap - Peds 5 milliGRAM(s) Oral <User Schedule>  baclofen Oral Liquid - Peds 15 milliGRAM(s) Oral every 8 hours  buDESOnide   for Nebulization - Peds 0.5 milliGRAM(s) Nebulizer every 12 hours  cannabidiol Oral Liquid - Peds 270 milliGRAM(s) Oral every 12 hours  chlorhexidine 0.12% Oral Liquid - Peds 15 milliLiter(s) Swish and Spit two times a day  cholecalciferol Oral Liquid - Peds 1200 Unit(s) Enteral Tube <User Schedule>  cloNIDine 0.2 mG/24Hr(s) Transdermal Patch - Peds 2 Patch Transdermal <User Schedule>  CRRT Treatment - Pediatric    <Continuous>  dexMEDEtomidine Infusion - Peds 1.7 MICROgram(s)/kG/Hr (15.3 mL/Hr) IV Continuous <Continuous>  epoetin mague Injection - Peds 3000 Unit(s) SubCutaneous <User Schedule>  eslicarbazepine Oral Tab/Cap - Peds 200 milliGRAM(s) Oral <User Schedule>  ferrous sulfate Oral Liquid - Peds 65 milliGRAM(s) Elemental Iron Enteral Tube <User Schedule>  heparin   Infusion for CRRT - Pediatric 10 Unit(s)/kG/Hr (0.9 mL/Hr) CRRT <Continuous>  lacosamide  Oral Liquid - Peds 200 milliGRAM(s) Oral two times a day  loperamide Oral Liquid - Peds 1 milliGRAM(s) Oral two times a day  mycophenolate mofetil  Oral Liquid - Peds 400 milliGRAM(s) Enteral Tube <User Schedule>  petrolatum, white/mineral oil Ophthalmic Ointment - Peds 1 Application(s) Both EYES two times a day  PrismaSATE Dialysate BGK 4 / 2.5 - Pediatric 5000 milliLiter(s) (1000 mL/Hr) CRRT <Continuous>  PrismaSOL Filtration BGK 4 / 2.5 - Pediatric 5000 milliLiter(s) (400 mL/Hr) CRRT <Continuous>  PrismaSOL Filtration BGK 4 / 2.5 - Pediatric 5000 milliLiter(s) (400 mL/Hr) CRRT <Continuous>  sodium chloride 3% for Nebulization - Peds 4 milliLiter(s) Nebulizer three times a day  tacrolimus  Oral Liquid - Peds 1.5 milliGRAM(s) Oral <User Schedule>    MEDICATIONS  (PRN):  acetaminophen   Oral Liquid - Peds. 400 milliGRAM(s) Oral every 4 hours PRN Temp greater or equal to 38 C (100.4 F), Moderate Pain (4 - 6), Severe Pain (7 - 10)  ALBUTerol  Intermittent Nebulization - Peds 2.5 milliGRAM(s) Nebulizer every 6 hours PRN Shortness of Breath and/or Wheezing  hydrALAZINE IV Intermittent - Peds 7 milliGRAM(s) IV Intermittent every 4 hours PRN BP >130/90  NIFEdipine Oral Liquid - Peds 7.5 milliGRAM(s) Oral every 4 hours PRN BP >130/90      Vital Signs Last 24 Hrs  T(C): 36.9 (12 Mar 2020 14:00), Max: 36.9 (12 Mar 2020 14:00)  T(F): 98.4 (12 Mar 2020 14:00), Max: 98.4 (12 Mar 2020 14:00)  HR: 69 (12 Mar 2020 14:00) (53 - 107)  BP: 109/62 (12 Mar 2020 14:00) (99/64 - 123/66)  BP(mean): 74 (12 Mar 2020 14:00) (63 - 101)  RR: 22 (12 Mar 2020 14:00) (15 - 28)  SpO2: 100% (12 Mar 2020 14:00) (97% - 100%)  I&O's Detail    11 Mar 2020 07:01  -  12 Mar 2020 07:00  --------------------------------------------------------  IN:    dexmedetomidine Infusion - Peds: 27 mL    dexmedetomidine Infusion - Peds: 205 mL    dexmedetomidine Infusion - Peds: 61.2 mL    dextrose 5% + sodium chloride 0.9%. - Pediatric: 85 mL    Enteral Tube Flush: 120 mL    IV PiggyBack: 13.5 mL    Suplena: 935 mL  Total IN: 1446.7 mL    OUT:    Ileostomy: 559 mL    Other: 1389 mL  Total OUT: 1948 mL    Total NET: -501.3 mL      12 Mar 2020 07:01  -  12 Mar 2020 14:18  --------------------------------------------------------  IN:    dexmedetomidine Infusion - Peds: 122.4 mL    Suplena: 336 mL  Total IN: 458.4 mL    OUT:    Ileostomy: 50 mL    Other: 631 mL  Total OUT: 681 mL    Total NET: -222.6 mL        Daily     Daily     Physical Exam  General: intermittently screaming and crying  HEENT: EEG leads in place, +facial edema, clear conjunctiva, dysconjugate eye movements  Heart: RRR, no murmur  Lungs: Vented, coarse breath sounds bilaterally  Abdomen: Soft, nontender, GT in place, ileostomy bag with stool  Extremities: Warm, no edema  Skin: no rashes or lesions  Neuro: Awake, nonverbal at baseline      Lab Results:                        9.5    16.68 )-----------( 225      ( 12 Mar 2020 08:51 )             31.7     12 Mar 2020 08:51    142    |  101    |  16     ----------------------------<  82     4.4     |  23     |  1.40   11 Mar 2020 09:30    137    |  96     |  34     ----------------------------<  85     4.4     |  22     |  2.16     Ca    10.3       12 Mar 2020 08:51  Ca    10.0       11 Mar 2020 09:30  Phos  2.0       12 Mar 2020 08:51  Phos  2.8       11 Mar 2020 09:30  Mg     2.8       12 Mar 2020 08:51  Mg     2.6       11 Mar 2020 09:30    TPro  7.5    /  Alb  4.1    /  TBili  < 0.2  /  DBili  x      /  AST  22     /  ALT  20     /  AlkPhos  141    08 Mar 2020 03:25  TPro  8.1    /  Alb  4.5    /  TBili  0.3    /  DBili  x      /  AST  26     /  ALT  21     /  AlkPhos  156    06 Mar 2020 08:00    LIVER FUNCTIONS - ( 08 Mar 2020 03:25 )  Alb: 4.1 g/dL / Pro: 7.5 g/dL / ALK PHOS: 141 u/L / ALT: 20 u/L / AST: 22 u/L / GGT: x         LIVER FUNCTIONS - ( 06 Mar 2020 08:00 )  Alb: 4.5 g/dL / Pro: 8.1 g/dL / ALK PHOS: 156 u/L / ALT: 21 u/L / AST: 26 u/L / GGT: x           PT/INR - ( 12 Mar 2020 08:51 )   PT: 12.5 SEC;   INR: 1.09          PTT - ( 12 Mar 2020 08:51 )  PTT:63.9 SEC  Urinalysis Basic - ( 11 Mar 2020 02:00 )    Color: RED / Appearance: Lt TURBID / S.015 / pH: 6.0  Gluc: NEGATIVE / Ketone: NEGATIVE  / Bili: NEGATIVE / Urobili: NORMAL   Blood: LARGE / Protein: 200 / Nitrite: NEGATIVE   Leuk Esterase: TRACE / RBC: >50 / WBC 11-25   Sq Epi: FEW / Non Sq Epi: x / Bacteria: NEGATIVE    Tacrolimus trough (3/11): 3.9    Tacrolimus peak (3/11): 24.7    Tacrolimus trough (3/12): 9.1    Radiology: none Fall Risk

## 2023-02-27 NOTE — ED ADULT NURSE NOTE - OBJECTIVE STATEMENT
pt received into spot 3 A&Ox4 ambulatory with cane appears comfortable arrives via walk in triage for eval of multiple medical complaints reports hx migraines started on sumatriptan took for first time today and feels tightness in chest SOB and throat closing sensation pt speaks clear full sentences tolerates secretions airway is patent uvula midline no rash or hives noted pt appears extremely anxious. Respirations unlabored sating 100% on RA abd nondistended no vomiting 12 lead ekg done nsr noted to ccm. iv placed labs sent pt pending ed md fuentes

## 2023-02-27 NOTE — ED PROVIDER NOTE - NSFOLLOWUPINSTRUCTIONS_ED_ALL_ED_FT
Follow up with Dr. Blas as outpatient.  Take prednisone as prescribed for 2 more days.  Avoid sumatriptan in the future.  Return to ED with any worsening symptoms or other concerns.          Adverse Drug Reaction    WHAT YOU NEED TO KNOW:    An adverse drug reaction is a harmful reaction to a medicine given at the correct dose. The reaction can start soon after you take the medicine, or up to 2 weeks after you stop. An adverse drug reaction can cause serious conditions such toxic epidermal necrolysis (TEN) and anaphylaxis. TEN can cause severe skin damage. Anaphylaxis is a sudden, life-threatening reaction that needs immediate treatment. Ask your healthcare provider for more information on TEN, anaphylaxis, and other serious reactions.    DISCHARGE INSTRUCTIONS:    Call 911 for signs or symptoms of anaphylaxis, such as trouble breathing, swelling in your mouth or throat, or wheezing. You may also have itching, a rash, hives, or feel like you are going to faint.    Return to the emergency department if:   •Your heart is beating faster than usual.      •You are more tired than usual, and you are shivering.      •Your skin is blistering or peeling.      •One of your pupils becomes larger than usual, and does not return to normal.      Contact your healthcare provider if:   •You start a new medicine and develop a fever.      •You have an itchy rash.      •Your rash returns after treatment has stopped.      •You have questions or concerns about your condition or care.      Medicines:   •Epinephrine is used to treat severe allergic reactions such as anaphylaxis.      •Antihistamines decrease mild symptoms such as itching or a rash.      •Steroids are given to decrease swelling. Steroids may be given as a pill or a skin cream.      •Take your medicine as directed. Contact your healthcare provider if you think your medicine is not helping or if you have side effects. Tell your provider if you are allergic to any medicine. Keep a list of the medicines, vitamins, and herbs you take. Include the amounts, and when and why you take them. Bring the list or the pill bottles to follow-up visits. Carry your medicine list with you in case of an emergency.      Follow up with your healthcare provider as directed: Write down your questions so you remember to ask them during your visits.    Steps to take for signs or symptoms of anaphylaxis:   •Immediately give 1 shot of epinephrine only into the outer thigh muscle.      •Leave the shot in place as directed. Your healthcare provider may recommend you leave it in place for up to 10 seconds before you remove it. This helps make sure all of the epinephrine is delivered.      •Call 911 and go to the emergency department, even if the shot improved symptoms. Do not drive yourself. Bring the used epinephrine shot with you.      Safety precautions to take if you are at risk for anaphylaxis:   •Keep 2 shots of epinephrine with you at all times. You may need a second shot, because epinephrine only works for about 20 minutes and symptoms may return. Your healthcare provider can show you and family members how to give the shot. Check the expiration date every month and replace it before it expires.      •Create an action plan. Your healthcare provider can help you create a written plan that explains the allergy and an emergency plan to treat a reaction. The plan explains when to give a second epinephrine shot if symptoms return or do not improve after the first. Give copies of the action plan and emergency instructions to family members, work and school staff, and  providers. Show them how to give a shot of epinephrine.      •Be careful when you exercise. If you have had exercise-induced anaphylaxis, do not exercise right after you eat. Stop exercising right away if you start to develop any signs or symptoms of anaphylaxis. You may first feel tired, warm, or have itchy skin. Hives, swelling, and severe breathing problems may develop if you continue to exercise.      •Carry medical alert identification. Wear medical alert jewelry or carry a card that explains the medication allergy. Healthcare providers need to know that they should not give you this medicine. Ask your healthcare provider where to get these items.  Medical Alert Jewelry           •Read medicine labels before you use any medicine. Do not take anything that contains the medicine you are allergic to. This includes topical medicines that you put on your skin. Ask a pharmacist if you are not sure.      •Tell all healthcare providers about your allergy. Include the names of medicines you are allergic to and the symptoms of your allergic reactions.         © Copyright [employer] 2023           back to top                          © Copyright [employer] 2023

## 2023-02-27 NOTE — ED PROVIDER NOTE - PATIENT PORTAL LINK FT
You can access the FollowMyHealth Patient Portal offered by [facility] by registering at the following website: [email or web address]. By joining [facility]’s FollowMyHealth portal, you will also be able to view your health information using other applications (apps) compatible with our system.

## 2023-02-27 NOTE — ED PROVIDER NOTE - CARE PROVIDER_API CALL
Devon Blas)  Internal Medicine  [address] [place] [id number]  Phone: [phone]  Fax: [phone]  Follow Up Time:

## 2023-02-27 NOTE — ED PROVIDER NOTE - ENMT, MLM
Airway patent, Nasal mucosa clear. Mouth with normal mucosa. No intraoral swelling or drooling. No hives or urticaria.

## 2023-03-02 ENCOUNTER — NON-APPOINTMENT (OUTPATIENT)
Age: 53
End: 2023-03-02

## 2023-03-02 DIAGNOSIS — G43.909 MIGRAINE, UNSPECIFIED, NOT INTRACTABLE, WITHOUT STATUS MIGRAINOSUS: ICD-10-CM

## 2023-03-02 DIAGNOSIS — Z90.710 ACQUIRED ABSENCE OF BOTH CERVIX AND UTERUS: ICD-10-CM

## 2023-03-02 DIAGNOSIS — Z86.79 PERSONAL HISTORY OF OTHER DISEASES OF THE CIRCULATORY SYSTEM: ICD-10-CM

## 2023-03-02 DIAGNOSIS — Z20.822 CONTACT WITH AND (SUSPECTED) EXPOSURE TO COVID-19: ICD-10-CM
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
## 2023-03-02 DIAGNOSIS — R07.0 PAIN IN THROAT: ICD-10-CM
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
## 2023-03-02 DIAGNOSIS — R07.89 OTHER CHEST PAIN: ICD-10-CM

## 2023-03-02 DIAGNOSIS — K21.9 GASTRO-ESOPHAGEAL REFLUX DISEASE WITHOUT ESOPHAGITIS: ICD-10-CM

## 2023-03-02 DIAGNOSIS — Z87.39 PERSONAL HISTORY OF OTHER DISEASES OF THE MUSCULOSKELETAL SYSTEM AND CONNECTIVE TISSUE: ICD-10-CM

## 2023-03-02 DIAGNOSIS — Z88.8 ALLERGY STATUS TO OTHER DRUGS, MEDICAMENTS AND BIOLOGICAL SUBSTANCES STATUS: ICD-10-CM

## 2023-03-02 DIAGNOSIS — T39.8X5A ADVERSE EFFECT OF OTHER NONOPIOID ANALGESICS AND ANTIPYRETICS, NOT ELSEWHERE CLASSIFIED, INITIAL ENCOUNTER: ICD-10-CM

## 2023-03-03 ENCOUNTER — NON-APPOINTMENT (OUTPATIENT)
Age: 53
End: 2023-03-03

## 2023-03-07 ENCOUNTER — NON-APPOINTMENT (OUTPATIENT)
Age: 53
End: 2023-03-07

## 2023-03-07 ENCOUNTER — APPOINTMENT (OUTPATIENT)
Dept: INTERNAL MEDICINE | Facility: CLINIC | Age: 53
End: 2023-03-07
Payer: MEDICARE

## 2023-03-07 VITALS
DIASTOLIC BLOOD PRESSURE: 81 MMHG | SYSTOLIC BLOOD PRESSURE: 121 MMHG | OXYGEN SATURATION: 97 % | WEIGHT: 231 LBS | TEMPERATURE: 97.4 F | BODY MASS INDEX: 34.21 KG/M2 | HEART RATE: 87 BPM | HEIGHT: 69 IN

## 2023-03-07 DIAGNOSIS — M54.50 LOW BACK PAIN, UNSPECIFIED: ICD-10-CM

## 2023-03-07 DIAGNOSIS — G89.29 LOW BACK PAIN, UNSPECIFIED: ICD-10-CM

## 2023-03-07 PROCEDURE — 99215 OFFICE O/P EST HI 40 MIN: CPT | Mod: 25

## 2023-03-07 PROCEDURE — 36415 COLL VENOUS BLD VENIPUNCTURE: CPT

## 2023-03-07 RX ORDER — BLOOD PRESSURE TEST KIT-MEDIUM
KIT MISCELLANEOUS
Qty: 1 | Refills: 0 | Status: ACTIVE | COMMUNITY
Start: 2023-03-07 | End: 1900-01-01

## 2023-03-08 ENCOUNTER — NON-APPOINTMENT (OUTPATIENT)
Age: 53
End: 2023-03-08

## 2023-03-08 LAB
CHOLEST SERPL-MCNC: 252 MG/DL
HDLC SERPL-MCNC: 73 MG/DL
LDLC SERPL CALC-MCNC: 133 MG/DL
NONHDLC SERPL-MCNC: 179 MG/DL
TRIGL SERPL-MCNC: 231 MG/DL

## 2023-03-11 ENCOUNTER — EMERGENCY (EMERGENCY)
Facility: HOSPITAL | Age: 53
LOS: 1 days | Discharge: HOME | End: 2023-03-11
Attending: EMERGENCY MEDICINE | Admitting: EMERGENCY MEDICINE
Payer: MEDICARE

## 2023-03-11 VITALS
HEART RATE: 91 BPM | DIASTOLIC BLOOD PRESSURE: 99 MMHG | OXYGEN SATURATION: 98 % | RESPIRATION RATE: 16 BRPM | TEMPERATURE: 98 F | SYSTOLIC BLOOD PRESSURE: 178 MMHG | WEIGHT: 231.04 LBS | HEIGHT: 69 IN

## 2023-03-11 VITALS
SYSTOLIC BLOOD PRESSURE: 119 MMHG | DIASTOLIC BLOOD PRESSURE: 70 MMHG | OXYGEN SATURATION: 100 % | TEMPERATURE: 98 F | HEART RATE: 78 BPM | RESPIRATION RATE: 18 BRPM

## 2023-03-11 DIAGNOSIS — Z98.89 OTHER SPECIFIED POSTPROCEDURAL STATES: Chronic | ICD-10-CM

## 2023-03-11 LAB
ALBUMIN SERPL ELPH-MCNC: 4 G/DL — SIGNIFICANT CHANGE UP (ref 3.3–5)
ALP SERPL-CCNC: 91 U/L — SIGNIFICANT CHANGE UP (ref 40–120)
ALT FLD-CCNC: 12 U/L — SIGNIFICANT CHANGE UP (ref 10–45)
ANION GAP SERPL CALC-SCNC: 12 MMOL/L — SIGNIFICANT CHANGE UP (ref 5–17)
AST SERPL-CCNC: 14 U/L — SIGNIFICANT CHANGE UP (ref 10–40)
BASOPHILS # BLD AUTO: 0.03 K/UL — SIGNIFICANT CHANGE UP (ref 0–0.2)
BASOPHILS NFR BLD AUTO: 0.2 % — SIGNIFICANT CHANGE UP (ref 0–2)
BILIRUB SERPL-MCNC: 0.2 MG/DL — SIGNIFICANT CHANGE UP (ref 0.2–1.2)
BUN SERPL-MCNC: 15 MG/DL — SIGNIFICANT CHANGE UP (ref 7–23)
CALCIUM SERPL-MCNC: 9.6 MG/DL — SIGNIFICANT CHANGE UP (ref 8.4–10.5)
CHLORIDE SERPL-SCNC: 100 MMOL/L — SIGNIFICANT CHANGE UP (ref 96–108)
CO2 SERPL-SCNC: 27 MMOL/L — SIGNIFICANT CHANGE UP (ref 22–31)
CREAT SERPL-MCNC: 0.75 MG/DL — SIGNIFICANT CHANGE UP (ref 0.5–1.3)
EGFR: 95 ML/MIN/1.73M2 — SIGNIFICANT CHANGE UP
EOSINOPHIL # BLD AUTO: 0.02 K/UL — SIGNIFICANT CHANGE UP (ref 0–0.5)
EOSINOPHIL NFR BLD AUTO: 0.1 % — SIGNIFICANT CHANGE UP (ref 0–6)
GLUCOSE SERPL-MCNC: 164 MG/DL — HIGH (ref 70–99)
HCT VFR BLD CALC: 39.8 % — SIGNIFICANT CHANGE UP (ref 34.5–45)
HGB BLD-MCNC: 12.7 G/DL — SIGNIFICANT CHANGE UP (ref 11.5–15.5)
IMM GRANULOCYTES NFR BLD AUTO: 0.7 % — SIGNIFICANT CHANGE UP (ref 0–0.9)
LYMPHOCYTES # BLD AUTO: 15.8 % — SIGNIFICANT CHANGE UP (ref 13–44)
LYMPHOCYTES # BLD AUTO: 2.19 K/UL — SIGNIFICANT CHANGE UP (ref 1–3.3)
MCHC RBC-ENTMCNC: 27.3 PG — SIGNIFICANT CHANGE UP (ref 27–34)
MCHC RBC-ENTMCNC: 31.9 GM/DL — LOW (ref 32–36)
MCV RBC AUTO: 85.6 FL — SIGNIFICANT CHANGE UP (ref 80–100)
MONOCYTES # BLD AUTO: 0.76 K/UL — SIGNIFICANT CHANGE UP (ref 0–0.9)
MONOCYTES NFR BLD AUTO: 5.5 % — SIGNIFICANT CHANGE UP (ref 2–14)
MRSA PCR RESULT.: NEGATIVE — SIGNIFICANT CHANGE UP
NEUTROPHILS # BLD AUTO: 10.76 K/UL — HIGH (ref 1.8–7.4)
NEUTROPHILS NFR BLD AUTO: 77.7 % — HIGH (ref 43–77)
NRBC # BLD: 0 /100 WBCS — SIGNIFICANT CHANGE UP (ref 0–0)
PLATELET # BLD AUTO: 344 K/UL — SIGNIFICANT CHANGE UP (ref 150–400)
POTASSIUM SERPL-MCNC: 4.2 MMOL/L — SIGNIFICANT CHANGE UP (ref 3.5–5.3)
POTASSIUM SERPL-SCNC: 4.2 MMOL/L — SIGNIFICANT CHANGE UP (ref 3.5–5.3)
PROT SERPL-MCNC: 7.2 G/DL — SIGNIFICANT CHANGE UP (ref 6–8.3)
RAPID RVP RESULT: SIGNIFICANT CHANGE UP
RBC # BLD: 4.65 M/UL — SIGNIFICANT CHANGE UP (ref 3.8–5.2)
RBC # FLD: 13.6 % — SIGNIFICANT CHANGE UP (ref 10.3–14.5)
S AUREUS DNA NOSE QL NAA+PROBE: NEGATIVE — SIGNIFICANT CHANGE UP
SARS-COV-2 RNA SPEC QL NAA+PROBE: SIGNIFICANT CHANGE UP
SODIUM SERPL-SCNC: 139 MMOL/L — SIGNIFICANT CHANGE UP (ref 135–145)
WBC # BLD: 13.86 K/UL — HIGH (ref 3.8–10.5)
WBC # FLD AUTO: 13.86 K/UL — HIGH (ref 3.8–10.5)

## 2023-03-11 PROCEDURE — 80053 COMPREHEN METABOLIC PANEL: CPT

## 2023-03-11 PROCEDURE — 87640 STAPH A DNA AMP PROBE: CPT

## 2023-03-11 PROCEDURE — 85025 COMPLETE CBC W/AUTO DIFF WBC: CPT

## 2023-03-11 PROCEDURE — 0225U NFCT DS DNA&RNA 21 SARSCOV2: CPT

## 2023-03-11 PROCEDURE — 87641 MR-STAPH DNA AMP PROBE: CPT

## 2023-03-11 PROCEDURE — 36415 COLL VENOUS BLD VENIPUNCTURE: CPT

## 2023-03-11 PROCEDURE — 99283 EMERGENCY DEPT VISIT LOW MDM: CPT

## 2023-03-11 PROCEDURE — 99284 EMERGENCY DEPT VISIT MOD MDM: CPT

## 2023-03-11 RX ORDER — SODIUM CHLORIDE 9 MG/ML
1000 INJECTION INTRAMUSCULAR; INTRAVENOUS; SUBCUTANEOUS ONCE
Refills: 0 | Status: COMPLETED | OUTPATIENT
Start: 2023-03-11 | End: 2023-03-11

## 2023-03-11 RX ADMIN — SODIUM CHLORIDE 1000 MILLILITER(S): 9 INJECTION INTRAMUSCULAR; INTRAVENOUS; SUBCUTANEOUS at 15:37

## 2023-03-11 NOTE — ED ADULT TRIAGE NOTE - CHIEF COMPLAINT QUOTE
"I need to be tested for HMPV", pt was a family member for patient admitted to [facility] for HMPV.  C/o of generalized body weakness x1day, but R arm numbness since 8 AM.  No slurred speech, no facial droop, no unilateral weakness.

## 2023-03-11 NOTE — ED PROVIDER NOTE - CLINICAL SUMMARY MEDICAL DECISION MAKING FREE TEXT BOX
51 y/o F here with paresthesia to arms bilaterally requesting testing for human metapneumovirus due to contact with her sister. Will obtain labs, give fluids, and reassess. 53 y/o F here with paresthesia to arms bilaterally requesting testing for human metapneumovirus due to contact with her sister. Will obtain labs, give fluids, and reassess.     Pt feeling reassured in ED.  Also requests MRSA swab because sister also positive.    Pt to follow up with results of RVP and MRSA.

## 2023-03-11 NOTE — ED ADULT NURSE NOTE - NSFALLRSKINDICTYPE_ED_ALL_ED
[de-identified] : eye itch  [FreeTextEntry6] : 11 y/o male presents to the clinic with c/o left eye itch, drainage and redness x 2 days, worse in the morning on awakening, and associated with runny nose, and mild dry cough. Denies fever, headache, sore throat, N/V/D, chills or myalgia.   Need for Mobility Assisted Device

## 2023-03-11 NOTE — ED PROVIDER NOTE - NSFOLLOWUPINSTRUCTIONS_ED_ALL_ED_FT
Follow up with your MRSA and viral panel results later today.  Follow up with your PMD this week.  Return to ED with worsening symptoms or other concerns.        Paresthesia      Paresthesia is an abnormal burning or prickling sensation. It is usually felt in the hands, arms, legs, or feet. However, it may occur in any part of the body. Usually, paresthesia is not painful. It may feel like:  •Tingling or numbness.      •Buzzing.      •Itching.      Paresthesia may occur without any clear cause, or it may be caused by:  •Breathing too quickly (hyperventilation).      •Pressure on a nerve.      •An underlying medical condition.      •Side effects of a medicine.      •Nutritional deficiencies.      •Exposure to toxic chemicals.      Most people experience temporary (transient) paresthesia at some time in their lives. For some people, it may be long-lasting (chronic) because of an underlying medical condition. If you have paresthesia that lasts a long time, you need to be evaluated by your health care provider.      Follow these instructions at home:      Nutrition   A plate with examples of foods in a healthy diet.   Eat a healthy diet. This includes:  •Eating foods that are high in fiber, such as beans, whole grains, and fresh fruits and vegetables.      •Limiting foods that are high in fat and processed sugars, such as fried or sweet foods.        Alcohol use   A sign showing that a person should not drink alcohol.   •Avoid or limit alcohol. Too much alcohol can cause a vitamin B deficiency, and vitamin B is needed for healthy nerves.    • Do not drink alcohol if:  •Your health care provider tells you not to drink.      •You are pregnant, may be pregnant, or are planning to become pregnant.      •If you drink alcohol:•Limit how much you have to:  •0–1 drink a day for women.      •0–2 drinks a day for men.        •Know how much alcohol is in your drink. In the [place] one drink equals one 12 oz bottle of beer (355 mL), one 5 oz glass of wine (148 mL), or one 1½ oz glass of hard liquor (44 mL).        General instructions     •Take over-the-counter and prescription medicines only as told by your health care provider.      • Do not use any products that contain nicotine or tobacco. These products include cigarettes, chewing tobacco, and vaping devices, such as e-cigarettes. If you need help quitting, ask your health care provider.      •If you have diabetes, work closely with your health care provider to keep your blood sugar under control.    •If you have numbness in your feet:  •Check every day for signs of injury or infection. Watch for redness, warmth, and swelling.      •Wear padded socks and comfortable shoes. These help protect your feet.        •Keep all follow-up visits. This is important.        Contact a health care provider if you:    •Have paresthesia that gets worse or does not go away.      •Have numbness after an injury.      •Have a burning or prickling feeling that gets worse when you walk.      •Have pain, cramps, or dizziness, or you faint.      •Develop a rash.        Get help right away if you:    •Feel muscle weakness.      •Develop new weakness in an arm or leg.      •Have trouble walking or moving.      •Have problems with speech, understanding, or vision.      •Feel confused.      •Cannot control your bladder or bowel movements.      These symptoms may be an emergency. Get help right away. Call 911.   • Do not wait to see if the symptoms will go away.        • Do not drive yourself to the hospital.         Summary    •Paresthesia is an abnormal burning or prickling sensation that is usually felt in the hands, arms, legs, or feet. It may also occur in other parts of the body.      •Paresthesia may occur without any clear cause, or it may be caused by breathing too quickly (hyperventilation), pressure on a nerve, an underlying medical condition, side effects of a medicine, nutritional deficiencies, or exposure to toxic chemicals.      •If you have paresthesia that lasts a long time, you need to be evaluated by your health care provider.      This information is not intended to replace advice given to you by your health care provider. Make sure you discuss any questions you have with your health care provider.      Document Revised: 08/29/2022 Document Reviewed: 08/29/2022    Elsevier Patient Education © 2023 [employer]

## 2023-03-11 NOTE — ED ADULT NURSE NOTE - OBJECTIVE STATEMENT
.  52years female alert mental state (AOX3) received by EMS.  pt is ambulatory with walker.  -complain of numbness.  pt has weakness since yesterday and combines with Rt arm numbness since 8am today.   -denied dysarthria, chest pain, SOB, dizziness, headache, n/v/d, abdomen pain.  Pt is in the bed comfortably at this time. Will continue to monitor and document any changes.

## 2023-03-11 NOTE — ED PROVIDER NOTE - PATIENT PORTAL LINK FT
You can access the FollowMyHealth Patient Portal offered by [facility] by registering at the following website: [email or web address]. By joining [employer]’s FollowMyHealth portal, you will also be able to view your health information using other applications (apps) compatible with our system.

## 2023-03-11 NOTE — ED PROVIDER NOTE - OBJECTIVE STATEMENT
53 y/o F with a PMHx of migraines, headaches, and multiple allergies was seen here February 27th for an allergic reaction and given prednisone with alleviation of symptoms. Pt states she was here visiting her sister who recently tested positive for human metapneumovirus. Pt concerned she may have caught it as well and is requesting to be tested. Pt states she feels weakness and numbness to hear arms bilaterally. Pt denies any fevers, chills, or cough.

## 2023-03-11 NOTE — ED PROVIDER NOTE - NS_EDPROVIDERDISPOUSERTYPE_ED_A_ED
WDL Scribe Attestation (For Scribes USE Only)... Attending Attestation (For Attendings USE Only).../Scribe Attestation (For Scribes USE Only)...

## 2023-03-11 NOTE — ED ADULT NURSE NOTE - CHIEF COMPLAINT QUOTE
"I need to be tested for HMPV", pt was a family member for patient admitted to [facility] for HMPV.  C/o of generalized body weakness x1day, but R arm numbness since 8 AM.  No slurred speech, no facial droop, no unilateral weakness.

## 2023-03-15 DIAGNOSIS — Z90.710 ACQUIRED ABSENCE OF BOTH CERVIX AND UTERUS: ICD-10-CM

## 2023-03-15 DIAGNOSIS — Z86.79 PERSONAL HISTORY OF OTHER DISEASES OF THE CIRCULATORY SYSTEM: ICD-10-CM

## 2023-03-15 DIAGNOSIS — M51.36 OTHER INTERVERTEBRAL DISC DEGENERATION, LUMBAR REGION: ICD-10-CM

## 2023-03-15 DIAGNOSIS — Z88.8 ALLERGY STATUS TO OTHER DRUGS, MEDICAMENTS AND BIOLOGICAL SUBSTANCES: ICD-10-CM

## 2023-03-15 DIAGNOSIS — R20.2 PARESTHESIA OF SKIN: ICD-10-CM

## 2023-03-15 DIAGNOSIS — Z88.5 ALLERGY STATUS TO NARCOTIC AGENT: ICD-10-CM

## 2023-03-15 DIAGNOSIS — G43.909 MIGRAINE, UNSPECIFIED, NOT INTRACTABLE, WITHOUT STATUS MIGRAINOSUS: ICD-10-CM

## 2023-03-15 DIAGNOSIS — Z88.1 ALLERGY STATUS TO OTHER ANTIBIOTIC AGENTS STATUS: ICD-10-CM

## 2023-03-15 DIAGNOSIS — R53.1 WEAKNESS: ICD-10-CM

## 2023-03-15 DIAGNOSIS — Z87.42 PERSONAL HISTORY OF OTHER DISEASES OF THE FEMALE GENITAL TRACT: ICD-10-CM

## 2023-03-15 DIAGNOSIS — Z20.822 CONTACT WITH AND (SUSPECTED) EXPOSURE TO COVID-19: ICD-10-CM

## 2023-03-22 ENCOUNTER — APPOINTMENT (OUTPATIENT)
Dept: INTERNAL MEDICINE | Facility: CLINIC | Age: 53
End: 2023-03-22
Payer: MEDICARE

## 2023-03-22 DIAGNOSIS — I67.89 OTHER CEREBROVASCULAR DISEASE: ICD-10-CM

## 2023-03-22 DIAGNOSIS — R68.89 OTHER GENERAL SYMPTOMS AND SIGNS: ICD-10-CM

## 2023-03-22 DIAGNOSIS — R42 DIZZINESS AND GIDDINESS: ICD-10-CM

## 2023-03-22 PROCEDURE — 99213 OFFICE O/P EST LOW 20 MIN: CPT | Mod: 95

## 2023-05-11 ENCOUNTER — APPOINTMENT (OUTPATIENT)
Dept: ORTHOPEDIC SURGERY | Facility: CLINIC | Age: 53
End: 2023-05-11
Payer: MEDICARE

## 2023-05-11 DIAGNOSIS — M54.12 RADICULOPATHY, CERVICAL REGION: ICD-10-CM

## 2023-05-11 PROCEDURE — 99214 OFFICE O/P EST MOD 30 MIN: CPT

## 2023-05-11 NOTE — DISCUSSION/SUMMARY
[de-identified] : L5/S1 disc degeneration with modic changes; cervical radiculopathy\par I have discussed my findings with the patient.  She has been doing HEP without relief.  I have referred her to Dr. Jose D Jansen for interventional pain management approach.  If this is not effective for the lumbar, I have recommended intercept procedure with Dr. Chuy Caban.  Referrals given to the patient.  She should follow up with me if the pain management and intercept is unsuccessful.  we discussed if this were to fail she would be a candidate for a L5/S1 fusion.  All questions answered.

## 2023-05-11 NOTE — HISTORY OF PRESENT ILLNESS
[de-identified] : 5/11/23: Here for follow up.  Patient continues to have severe lumbosacral pain, worse on the left.  radiates to the lower abdomen/groin bilaterally along the belt line.  Has been doing a home exercise program diligently without relief.  Has had pain management injections in the past with relief at that time though not in the past few years.  MRI done 10/2022. Denies numbness/tingling radiating pain in lower extremities.  Still has cervical pain radiating to the left upper extremity.

## 2023-05-11 NOTE — PHYSICAL EXAM
[de-identified] : Physical Exam:\par \par General: patient is well developed, well nourished, in no acute \par distress, alert and oriented x 3. \par \par Mood and affect: normal\par \par Respiratory: no respiratory distress noted\par \par Skin: no scars over spine, skin intact, no erythema, increased warmth\par \par Alignment:The spine is well compensated in the coronal and sagittal plane.\par \par Gait: Slow with walker\par \par Palpation: no tenderness to palpation midline along spine or paraspinal region\par \par Range of motion: Cervical and Lumbar spine ROM is restricted\par \par Neurologic Exam:\par Motor: Manual Muscle testing in the upper and lower extremities is 5 out of 5 in all muscle groups. There is no evidence of muscular atrophy in the upper extremities. Sensory: Sensation to light touch is grossly intact in the upper and lower extremities\par \par Reflexes: DTR are present and symmetric throughout, negative toscano bilaterally, negative inverted radial reflex bilaterally, no clonus, plantar responses are flexor\par \par Special tests: Spurlings sign absent. Lhermitte's sign is absent. Straight Leg Raise Negative, Cross Straight Leg Raise Negative, VENITA Test Negative\par \par Hip Exam: Full painless ROM of bilateral hips\par \par Vascular: Examination of the peripheral vascular system demonstrates no evidence of congestion or edema. no lymphedema bilateral lower extremities, pulses are present and symmetric in both lower extremities. [de-identified] : MRI Lumbar 10/22/2022 my read: L5/S1 disc degeneration moderate with modic changes.  No significant neurocompressive lesion.  There is evidence of facet hypertrophy in the lumbar region.\par \par MRI cervical 10/2021: C3/C4 small left sided disc herniation and facet artrosis with severe left and moderate right foramianl stenosis; no cord compression or cord signal change; C4/5 moderate bilateral foraminal steonsis; C5/6: no significant stenosis; C6/7: left disc osteophyte with some compression of the left c7 root.\par \par XR AP/lateral cervical 9/2021: mild multilevel degenerative changes, no spondylolisthesis, no fractures seen.

## 2023-05-25 ENCOUNTER — APPOINTMENT (OUTPATIENT)
Dept: INTERNAL MEDICINE | Facility: CLINIC | Age: 53
End: 2023-05-25
Payer: MEDICARE

## 2023-05-25 DIAGNOSIS — T78.40XA ALLERGY, UNSPECIFIED, INITIAL ENCOUNTER: ICD-10-CM

## 2023-05-25 PROCEDURE — 99214 OFFICE O/P EST MOD 30 MIN: CPT | Mod: 95

## 2023-05-25 RX ORDER — GABAPENTIN 300 MG/1
300 CAPSULE ORAL
Qty: 90 | Refills: 0 | Status: DISCONTINUED | COMMUNITY
Start: 2018-01-31 | End: 2023-05-25

## 2023-07-18 ENCOUNTER — APPOINTMENT (OUTPATIENT)
Dept: ORTHOPEDIC SURGERY | Facility: CLINIC | Age: 53
End: 2023-07-18
Payer: MEDICARE

## 2023-07-18 PROCEDURE — 99213 OFFICE O/P EST LOW 20 MIN: CPT

## 2023-07-25 NOTE — DISCUSSION/SUMMARY
[de-identified] : Diagnosis: Chronic lumbar radiculopathy, lumbar disc degeneration.\par \par I discussed my findings with the patient. She should continue to follow up with Dr. Jansen for conservative measures including injections. If she is still having pain, she should follow up with me in approximately 3 to 4 months, sooner if there is an issue. All questions answered.

## 2023-07-25 NOTE — ADDENDUM
[FreeTextEntry1] : Documented by Betty Andrade acting as a scribe for Dr. Fernando Mosquera on 07/18/2023.

## 2023-07-25 NOTE — PHYSICAL EXAM
[de-identified] : Physical Exam:\par \par General: patient is well developed, well nourished, in no acute\par distress, alert and oriented x 3.\par \par Mood and affect: normal\par \par Respiratory: no respiratory distress noted\par \par Skin: no scars over spine, skin intact, no erythema, increased warmth\par \par Alignment: The spine is well compensated in the coronal and sagittal plane.\par \par Gait: The patient is able to toe walk and heel walk without difficulty. The patient is able to tandem gait without difficulty.\par \par Palpation: Tender to palpation midline lumbar region.\par \par Range of motion: Lumbar spine ROM is restricted.\par \par Neurologic Exam:\par Motor: Manual Muscle testing in the lower extremities is 5 out of 5 in all muscle groups. There is no evidence of muscular\par atrophy in the lower extremities. Sensory: Sensation to light touch is grossly intact in the lower extremities\par \par Reflexes: DTR are present and symmetric throughout, no clonus, plantar responses are flexor\par \par Hip Exam: No pain with internal or external rotation of hips bilaterally\par \par Special tests: Straight leg raise test negative. Cross straight leg test negative. VENITA test negative\par \par Vascular: Examination of the peripheral vascular system demonstrates no evidence of congestion or edema. no evidence of\par lymphedema bilateral lower extremities, pulses are present and symmetric in both lower extremities. [de-identified] : MRI Lumbar 10/22/2022 my read: L5/S1 disc degeneration moderate with Modic changes.  No significant neurocompressive lesion.  There is evidence of facet hypertrophy in the lumbar region.\par \par MRI cervical 10/2021: C3/C4 small left sided disc herniation and facet arthrosis with severe left and moderate right foraminal stenosis; no cord compression or cord signal change; C4/5 moderate bilateral foraminal stenosis; C5/6: no significant stenosis; C6/7: left disc osteophyte with some compression of the left C7 root.\par \par XR AP/lateral cervical 9/2021: mild multilevel degenerative changes, no spondylolisthesis, no fractures seen.

## 2023-07-25 NOTE — END OF VISIT
[FreeTextEntry3] : All medical record entries made by the Scribe were at my, Dr. Fernando Mosquera, direction and personally dictated by me on 07/18/2023. I have reviewed the chart and agree that the record accurately reflects my personal performance of the history, physical exam, assessment and plan. I have also personally directed, reviewed, and agreed with the chart.

## 2023-07-25 NOTE — HISTORY OF PRESENT ILLNESS
[de-identified] : Follow up 07/18/2023: Ms. Armenta presents for follow up. She continues to have low back pain that radiates to her abdomen and diffusely down both lower extremities. She reports approximately 50 percent relief of her symptoms, especially her back and abdominal pressure that she experiences, after an epidural and RFA with Dr. Jansen. RFA was done in June. She denies any new neurologic symptoms. She continues to ambulate with a walker.\par \par 5/11/23: Here for follow up.  Patient continues to have severe lumbosacral pain, worse on the left.  radiates to the lower abdomen/groin bilaterally along the belt line.  Has been doing a home exercise program diligently without relief.  Has had pain management injections in the past with relief at that time though not in the past few years.  MRI done 10/2022. Denies numbness/tingling radiating pain in lower extremities.  Still has cervical pain radiating to the left upper extremity.

## 2023-09-03 ENCOUNTER — INPATIENT (INPATIENT)
Facility: HOSPITAL | Age: 53
LOS: 7 days | Discharge: NURSING, REHAB OR LONG-TERM CARE | End: 2023-09-11
Attending: INTERNAL MEDICINE | Admitting: INTERNAL MEDICINE
Payer: MEDICARE

## 2023-09-03 VITALS
HEART RATE: 72 BPM | WEIGHT: 233.91 LBS | SYSTOLIC BLOOD PRESSURE: 133 MMHG | TEMPERATURE: 98 F | HEIGHT: 69 IN | DIASTOLIC BLOOD PRESSURE: 67 MMHG | OXYGEN SATURATION: 100 % | RESPIRATION RATE: 18 BRPM

## 2023-09-03 DIAGNOSIS — Z98.89 OTHER SPECIFIED POSTPROCEDURAL STATES: Chronic | ICD-10-CM

## 2023-09-03 PROCEDURE — 99285 EMERGENCY DEPT VISIT HI MDM: CPT

## 2023-09-03 RX ORDER — IBUPROFEN 200 MG
600 TABLET ORAL ONCE
Refills: 0 | Status: COMPLETED | OUTPATIENT
Start: 2023-09-03 | End: 2023-09-03

## 2023-09-03 RX ORDER — LIDOCAINE 4 G/100G
1 CREAM TOPICAL ONCE
Refills: 0 | Status: COMPLETED | OUTPATIENT
Start: 2023-09-03 | End: 2023-09-03

## 2023-09-03 RX ADMIN — Medication 600 MILLIGRAM(S): at 23:26

## 2023-09-03 RX ADMIN — LIDOCAINE 1 PATCH: 4 CREAM TOPICAL at 23:26

## 2023-09-03 NOTE — ED ADULT NURSE NOTE - ED STAT RN HANDOFF DETAILS
Report given to Robert JOHN. Pt asleep in stretcher. No acute distress noted. Continuation of care endorsed. Report given to Natalya JOHN. Pt asleep in stretcher. No acute distress noted. Continuation of care endorsed.

## 2023-09-03 NOTE — ED ADULT NURSE NOTE - NSFALLHARMRISKINTERV_ED_ALL_ED

## 2023-09-03 NOTE — ED ADULT TRIAGE NOTE - CHIEF COMPLAINT QUOTE
PMH of degenerative disc disease, vertigo, RA  C/o slip and fall on wet floor ~6pm, complaining of left leg pain, knee pain, r/t to the toe. Denies head strike, loc. Only takes aspirin.

## 2023-09-03 NOTE — ED ADULT NURSE NOTE - OBJECTIVE STATEMENT
Pt s/p slip and fall today. Pt accidentally slipped in a pool of water and twisted her L ankle and bumped her R elbow. Pt presents to ED with swollen L ankle and knee. Bruise to L knee and R elbow, no active bleeding noted at this time. Pt able to move all extremities but reports experiencing pain with movement. Denies LOC or head injury. Pt reports aspirin use. Pt speaking in clear complete sentences.

## 2023-09-04 DIAGNOSIS — R42 DIZZINESS AND GIDDINESS: ICD-10-CM

## 2023-09-04 DIAGNOSIS — M51.36 OTHER INTERVERTEBRAL DISC DEGENERATION, LUMBAR REGION: ICD-10-CM

## 2023-09-04 DIAGNOSIS — M25.562 PAIN IN LEFT KNEE: ICD-10-CM

## 2023-09-04 DIAGNOSIS — M25.572 PAIN IN LEFT ANKLE AND JOINTS OF LEFT FOOT: ICD-10-CM

## 2023-09-04 LAB
ALBUMIN SERPL ELPH-MCNC: 3.2 G/DL — LOW (ref 3.3–5)
ALP SERPL-CCNC: 82 U/L — SIGNIFICANT CHANGE UP (ref 40–120)
ALT FLD-CCNC: 16 U/L — SIGNIFICANT CHANGE UP (ref 12–78)
ANION GAP SERPL CALC-SCNC: 4 MMOL/L — LOW (ref 5–17)
AST SERPL-CCNC: 18 U/L — SIGNIFICANT CHANGE UP (ref 15–37)
BASOPHILS # BLD AUTO: 0.06 K/UL — SIGNIFICANT CHANGE UP (ref 0–0.2)
BASOPHILS NFR BLD AUTO: 0.8 % — SIGNIFICANT CHANGE UP (ref 0–2)
BILIRUB SERPL-MCNC: 0.2 MG/DL — SIGNIFICANT CHANGE UP (ref 0.2–1.2)
BUN SERPL-MCNC: 17 MG/DL — SIGNIFICANT CHANGE UP (ref 7–23)
CALCIUM SERPL-MCNC: 8.9 MG/DL — SIGNIFICANT CHANGE UP (ref 8.5–10.1)
CHLORIDE SERPL-SCNC: 107 MMOL/L — SIGNIFICANT CHANGE UP (ref 96–108)
CO2 SERPL-SCNC: 27 MMOL/L — SIGNIFICANT CHANGE UP (ref 22–31)
CREAT SERPL-MCNC: 0.74 MG/DL — SIGNIFICANT CHANGE UP (ref 0.5–1.3)
EGFR: 95 ML/MIN/1.73M2 — SIGNIFICANT CHANGE UP
EOSINOPHIL # BLD AUTO: 0.19 K/UL — SIGNIFICANT CHANGE UP (ref 0–0.5)
EOSINOPHIL NFR BLD AUTO: 2.5 % — SIGNIFICANT CHANGE UP (ref 0–6)
GLUCOSE SERPL-MCNC: 123 MG/DL — HIGH (ref 70–99)
HCT VFR BLD CALC: 35.8 % — SIGNIFICANT CHANGE UP (ref 34.5–45)
HGB BLD-MCNC: 11.7 G/DL — SIGNIFICANT CHANGE UP (ref 11.5–15.5)
IMM GRANULOCYTES NFR BLD AUTO: 0.3 % — SIGNIFICANT CHANGE UP (ref 0–0.9)
LYMPHOCYTES # BLD AUTO: 3.03 K/UL — SIGNIFICANT CHANGE UP (ref 1–3.3)
LYMPHOCYTES # BLD AUTO: 40.6 % — SIGNIFICANT CHANGE UP (ref 13–44)
MCHC RBC-ENTMCNC: 26.6 PG — LOW (ref 27–34)
MCHC RBC-ENTMCNC: 32.7 G/DL — SIGNIFICANT CHANGE UP (ref 32–36)
MCV RBC AUTO: 81.4 FL — SIGNIFICANT CHANGE UP (ref 80–100)
MONOCYTES # BLD AUTO: 0.73 K/UL — SIGNIFICANT CHANGE UP (ref 0–0.9)
MONOCYTES NFR BLD AUTO: 9.8 % — SIGNIFICANT CHANGE UP (ref 2–14)
NEUTROPHILS # BLD AUTO: 3.43 K/UL — SIGNIFICANT CHANGE UP (ref 1.8–7.4)
NEUTROPHILS NFR BLD AUTO: 46 % — SIGNIFICANT CHANGE UP (ref 43–77)
NRBC # BLD: 0 /100 WBCS — SIGNIFICANT CHANGE UP (ref 0–0)
PLATELET # BLD AUTO: 257 K/UL — SIGNIFICANT CHANGE UP (ref 150–400)
POTASSIUM SERPL-MCNC: 4.2 MMOL/L — SIGNIFICANT CHANGE UP (ref 3.5–5.3)
POTASSIUM SERPL-SCNC: 4.2 MMOL/L — SIGNIFICANT CHANGE UP (ref 3.5–5.3)
PROT SERPL-MCNC: 7.4 GM/DL — SIGNIFICANT CHANGE UP (ref 6–8.3)
RBC # BLD: 4.4 M/UL — SIGNIFICANT CHANGE UP (ref 3.8–5.2)
RBC # FLD: 14.1 % — SIGNIFICANT CHANGE UP (ref 10.3–14.5)
SODIUM SERPL-SCNC: 138 MMOL/L — SIGNIFICANT CHANGE UP (ref 135–145)
WBC # BLD: 7.46 K/UL — SIGNIFICANT CHANGE UP (ref 3.8–10.5)
WBC # FLD AUTO: 7.46 K/UL — SIGNIFICANT CHANGE UP (ref 3.8–10.5)

## 2023-09-04 PROCEDURE — 73590 X-RAY EXAM OF LOWER LEG: CPT | Mod: 26,50

## 2023-09-04 PROCEDURE — 73630 X-RAY EXAM OF FOOT: CPT | Mod: 26,50

## 2023-09-04 PROCEDURE — 99222 1ST HOSP IP/OBS MODERATE 55: CPT

## 2023-09-04 PROCEDURE — 73080 X-RAY EXAM OF ELBOW: CPT | Mod: 26,RT

## 2023-09-04 PROCEDURE — 73700 CT LOWER EXTREMITY W/O DYE: CPT | Mod: 26,LT,MA

## 2023-09-04 PROCEDURE — 73610 X-RAY EXAM OF ANKLE: CPT | Mod: 26,50

## 2023-09-04 PROCEDURE — 73564 X-RAY EXAM KNEE 4 OR MORE: CPT | Mod: 26,50

## 2023-09-04 RX ORDER — CHOLECALCIFEROL (VITAMIN D3) 125 MCG
2000 CAPSULE ORAL ONCE
Refills: 0 | Status: COMPLETED | OUTPATIENT
Start: 2023-09-04 | End: 2023-09-04

## 2023-09-04 RX ORDER — TRAMADOL HYDROCHLORIDE 50 MG/1
25 TABLET ORAL EVERY 6 HOURS
Refills: 0 | Status: DISCONTINUED | OUTPATIENT
Start: 2023-09-04 | End: 2023-09-10

## 2023-09-04 RX ORDER — GABAPENTIN 400 MG/1
300 CAPSULE ORAL
Refills: 0 | Status: DISCONTINUED | OUTPATIENT
Start: 2023-09-04 | End: 2023-09-11

## 2023-09-04 RX ORDER — FAMOTIDINE 10 MG/ML
20 INJECTION INTRAVENOUS
Refills: 0 | Status: DISCONTINUED | OUTPATIENT
Start: 2023-09-04 | End: 2023-09-11

## 2023-09-04 RX ORDER — PREGABALIN 225 MG/1
1000 CAPSULE ORAL DAILY
Refills: 0 | Status: DISCONTINUED | OUTPATIENT
Start: 2023-09-04 | End: 2023-09-11

## 2023-09-04 RX ORDER — KETOROLAC TROMETHAMINE 30 MG/ML
15 SYRINGE (ML) INJECTION ONCE
Refills: 0 | Status: DISCONTINUED | OUTPATIENT
Start: 2023-09-04 | End: 2023-09-04

## 2023-09-04 RX ORDER — ACETAMINOPHEN 500 MG
650 TABLET ORAL EVERY 6 HOURS
Refills: 0 | Status: DISCONTINUED | OUTPATIENT
Start: 2023-09-04 | End: 2023-09-11

## 2023-09-04 RX ORDER — ASPIRIN/CALCIUM CARB/MAGNESIUM 324 MG
81 TABLET ORAL DAILY
Refills: 0 | Status: DISCONTINUED | OUTPATIENT
Start: 2023-09-04 | End: 2023-09-11

## 2023-09-04 RX ORDER — HEPARIN SODIUM 5000 [USP'U]/ML
5000 INJECTION INTRAVENOUS; SUBCUTANEOUS EVERY 12 HOURS
Refills: 0 | Status: DISCONTINUED | OUTPATIENT
Start: 2023-09-04 | End: 2023-09-11

## 2023-09-04 RX ORDER — ACETAMINOPHEN 500 MG
975 TABLET ORAL ONCE
Refills: 0 | Status: COMPLETED | OUTPATIENT
Start: 2023-09-04 | End: 2023-09-04

## 2023-09-04 RX ADMIN — Medication 650 MILLIGRAM(S): at 18:53

## 2023-09-04 RX ADMIN — GABAPENTIN 300 MILLIGRAM(S): 400 CAPSULE ORAL at 17:25

## 2023-09-04 RX ADMIN — Medication 975 MILLIGRAM(S): at 03:51

## 2023-09-04 RX ADMIN — Medication 81 MILLIGRAM(S): at 17:25

## 2023-09-04 RX ADMIN — Medication 15 MILLIGRAM(S): at 10:56

## 2023-09-04 RX ADMIN — FAMOTIDINE 20 MILLIGRAM(S): 10 INJECTION INTRAVENOUS at 17:25

## 2023-09-04 RX ADMIN — Medication 15 MILLIGRAM(S): at 12:04

## 2023-09-04 RX ADMIN — TRAMADOL HYDROCHLORIDE 25 MILLIGRAM(S): 50 TABLET ORAL at 21:49

## 2023-09-04 RX ADMIN — HEPARIN SODIUM 5000 UNIT(S): 5000 INJECTION INTRAVENOUS; SUBCUTANEOUS at 17:45

## 2023-09-04 RX ADMIN — Medication 2000 UNIT(S): at 15:30

## 2023-09-04 RX ADMIN — PREGABALIN 1000 MICROGRAM(S): 225 CAPSULE ORAL at 15:30

## 2023-09-04 RX ADMIN — TRAMADOL HYDROCHLORIDE 25 MILLIGRAM(S): 50 TABLET ORAL at 15:36

## 2023-09-04 RX ADMIN — Medication 600 MILLIGRAM(S): at 00:26

## 2023-09-04 NOTE — H&P ADULT - NSHPPHYSICALEXAM_GEN_ALL_CORE
Vital Signs Last 24 Hrs  T(C): 36.8 (04 Sep 2023 09:41), Max: 36.8 (04 Sep 2023 09:41)  T(F): 98.2 (04 Sep 2023 09:41), Max: 98.2 (04 Sep 2023 09:41)  HR: 61 (04 Sep 2023 09:41) (61 - 72)  BP: 133/71 (04 Sep 2023 09:41) (132/82 - 139/72)  BP(mean): 99 (04 Sep 2023 08:38) (99 - 99)  RR: 18 (04 Sep 2023 09:41) (16 - 18)  SpO2: 99% (04 Sep 2023 09:41) (99% - 100%)    Parameters below as of 04 Sep 2023 09:41  Patient On (Oxygen Delivery Method): room air    GENERAL: NAD, well-groomed, well-developed  HEAD:  Atraumatic, Normocephalic  EYES: EOMI, PERRLA, conjunctiva and sclera clear  ENMT: No tonsillar erythema, exudates, or enlargement; Moist mucous membranes, Good dentition, No lesions  NECK: Supple,  NERVOUS SYSTEM:  Alert & Oriented X3, Good concentration; Motor Strength 5/5 B/L upper and right lower  extremities;  on left lower extremity  limited movement due to pain.  mildly diminished sensation left leg     CHEST/LUNG: Clear to percussion bilaterally; No rales, rhonchi, wheezing, or rubs  HEART: Regular rate and rhythm; No murmurs, rubs, or gallops  ABDOMEN: Soft, Nontender, Nondistended; Bowel sounds present  EXTREMITIES:  2+ Peripheral Pulses, No clubbing, cyanosis, + edema b/l   SKIN: varicose veins right worse than left Vital Signs Last 24 Hrs  T(C): 36.8 (04 Sep 2023 09:41), Max: 36.8 (04 Sep 2023 09:41)  T(F): 98.2 (04 Sep 2023 09:41), Max: 98.2 (04 Sep 2023 09:41)  HR: 61 (04 Sep 2023 09:41) (61 - 72)  BP: 133/71 (04 Sep 2023 09:41) (132/82 - 139/72)  BP(mean): 99 (04 Sep 2023 08:38) (99 - 99)  RR: 18 (04 Sep 2023 09:41) (16 - 18)  SpO2: 99% (04 Sep 2023 09:41) (99% - 100%)    Parameters below as of 04 Sep 2023 09:41  Patient On (Oxygen Delivery Method): room air    GENERAL: NAD, well-groomed, well-developed  HEAD:  Atraumatic, Normocephalic  EYES: EOMI, PERRLA, conjunctiva and sclera clear  ENMT: No tonsillar erythema, exudates, or enlargement; Moist mucous membranes, Good dentition, No lesions  NECK: Supple,  NERVOUS SYSTEM:  Alert & Oriented X3, Good concentration; Motor Strength 5/5 B/L upper and right lower  extremities;  on left lower extremity  limited movement due to pain.  mildly diminished sensation left leg   left lower extremity in immobilizer  CHEST/LUNG: Clear to percussion bilaterally; No rales, rhonchi, wheezing, or rubs  HEART: Regular rate and rhythm; No murmurs, rubs, or gallops  ABDOMEN: Soft, Nontender, Nondistended; Bowel sounds present  EXTREMITIES:  2+ Peripheral Pulses, No clubbing, cyanosis, + edema b/l   SKIN: varicose veins right worse than left

## 2023-09-04 NOTE — ED PROVIDER NOTE - NS ED ROS FT
Gen: No fever  Resp: No cough or trouble breathing  Cardiovascular: No chest pain  Gastroenteric: No nausea/vomiting  MS: see HPI   Skin: No rashes  Neuro: No headache; no abnormal movements  Remainder negative, except as per the HPI

## 2023-09-04 NOTE — ED PROVIDER NOTE - CARE PLAN
Principal Discharge DX:	Bilateral knee pain  Secondary Diagnosis:	Left ankle sprain  Secondary Diagnosis:	Unable to ambulate   1

## 2023-09-04 NOTE — ED PROVIDER NOTE - CLINICAL SUMMARY MEDICAL DECISION MAKING FREE TEXT BOX
53F with a PMHx of multiple orthopedic issues including multiple surgeries for low back L4-5-S1, left knee arthroscopy, vertigo, orthostatic hypotension, vasovagal episode, multiple drug allergies and intolerances - who presents with severe left knee pain worse with movement, left ankle swelling and right elbow pain sp fall. states slip and fall at wedding, wearing heels. Denies head injury or LOC. Has chronic low back pain which she is following with PT. Notes numbness sensation anterior shins b/l mild  - noted joint effusion left knee and pain with left knee motion, swelling left ankle without torsten tenderness, tenderness right elbow, pelvis stable, hip rom intact without pain - xray lower extremities b/l knees, tib/fib/ankle/feet and right elbow to eval for traumatic injury, analgesia, ambulate, re-eval

## 2023-09-04 NOTE — H&P ADULT - NSHPADDITIONALINFOADULT_GEN_ALL_CORE
d/w sister Latha and patient both states [id number] can tolerate and has tolerated tramadol in the past

## 2023-09-04 NOTE — ED PROVIDER NOTE - OBJECTIVE STATEMENT
53F with a PMHx of multiple orthopedic issues including multiple surgeries for low back L4-5-S1, left knee arthroscopy, vertigo, orthostatic hypotension, vasovagal episode, multiple drug allergies and intolerances - who presents with severe left knee pain worse with movement, left ankle swelling and right elbow pain sp fall. states slip and fall at wedding, wearing heels. Denies head injury or LOC. Has chronic low back pain which she is following with PT. Notes numbness sensation anterior shins b/l mild

## 2023-09-04 NOTE — ED PROVIDER NOTE - PHYSICAL EXAMINATION
Gen: AOx3   Head: NCAT  ENT: Airway patent, moist mucous membranes, nasal passageways oxana  Cardiac: Normal rate, normal rhythm  Respiratory: Lungs CTA B/L  Gastrointestinal: Abdomen soft, nontender, nondistended, no rebound, no guarding  MSK: No gross abnormalities, + limited L knee extension/ flexion due to pain, + mild L knee jt effusion, + L ankle edema, b/l mild pretibial ecchymosis, no midline spinal ttp, normal ROOM neck, + TTP right elbow, normal ROM b/l hips   HEME: Extremities warm, pulses intact in b/l LE   Skin: No rashes, no lesions  Neuro: No gross neurologic deficits, normal speech

## 2023-09-04 NOTE — H&P ADULT - NSHPLABSRESULTS_GEN_ALL_CORE
11.7   7.46  )-----------( 257      ( 04 Sep 2023 06:08 )             35.8       09-04    138  |  107  |  17  ----------------------------<  123<H>  4.2   |  27  |  0.74    Ca    8.9      04 Sep 2023 06:08    TPro  7.4  /  Alb  3.2<L>  /  TBili  0.2  /  DBili  x   /  AST  18  /  ALT  16  /  AlkPhos  82  09-04    < from: CT Knee No Cont, Left (09.04.23 @ 03:02) >    Impression:  1. No acute fracture or dislocation.      < end of copied text >    < from: Xray Elbow AP + Lateral + Oblique, Right (09.04.23 @ 01:03) >      IMPRESSION: There is no fracture or dislocation. The joint spaces are   preserved.      < end of copied text >    < from: Xray Foot AP + Lateral + Oblique, Bilat (09.04.23 @ 01:03) >      IMPRESSION: No evidence of fracture or suspicious lesion.    Degenerative changes as above.    < end of copied text >    < from: Xray Tibia + Fibula 2 Views, Bilateral (09.04.23 @ 01:03) >    IMPRESSION: No evidence of fracture or suspicious lesion.    Degenerative changes as above.      < end of copied text >

## 2023-09-04 NOTE — ED PROVIDER NOTE - PROGRESS NOTE DETAILS
Cruz DO: pt unable to ambulate despite knee immobilizer- d/w ortho, recommending ct left knee as severe pain L knee - no pain at the hip, ct resulted wnl, d/w pt, ortho consult not clearly indicated as no fx, but as pt may not be able to ambulate at home and may need rehab, called ortho to weight in if alternative imaging required such as mri despite multiple attempts, unable to ambulate, will admit, limited analgesia as pt reports several drug intolerances

## 2023-09-04 NOTE — H&P ADULT - ASSESSMENT
53F with a PMHx of multiple orthopedic issues including multiple surgeries for low back L4-5-S1, left knee arthroscopy, vertigo, orthostatic hypotension, vasovagal episode, multiple drug allergies and intolerances - who presents with severe left knee  and ankle pain pain worse with movement, left ankle swelling and right elbow pain sp fall. states slip and fall at wedding, wearing heels. Denies head injury or LOC. Has chronic low back pain which she is following with PT. Notes numbness sensation anterior shins b/l mild

## 2023-09-04 NOTE — ED ADULT NURSE REASSESSMENT NOTE - NS ED NURSE REASSESS COMMENT FT1
Pt awake sitting up in stretcher, received Tylenol for pain. Ortho PA present at bedside for assessment. Safety measures maintained. Plan of care reviewed with pt. Awaiting new orders.

## 2023-09-04 NOTE — PATIENT PROFILE ADULT - FALL HARM RISK - HARM RISK INTERVENTIONS

## 2023-09-04 NOTE — H&P ADULT - HISTORY OF PRESENT ILLNESS
· Chief Complaint: The patient is a 53y Female complaining of fall.  · HPI Objective Statement: 53F with a PMHx of multiple orthopedic issues including multiple surgeries for low back L4-5-S1, left knee arthroscopy, vertigo, orthostatic hypotension, vasovagal episode, multiple drug allergies and intolerances - who presents with severe left knee  and ankle pain pain worse with movement, left ankle swelling and right elbow pain sp fall. states slip and fall at wedding, wearing heels. Denies head injury or LOC. Has chronic low back pain which she is following with PT. Notes numbness sensation anterior shins b/l mild

## 2023-09-04 NOTE — PATIENT PROFILE ADULT - FUNCTIONAL ASSESSMENT - BASIC MOBILITY 3.
Writer called patient in regards to an earlier appointment for his RFA.  Konstantinr offered patient an opening on 02/27/18 for his Left Knee RFA at 13:00.  Patient agreed to the opening and was told to arrive at 12:15.  Konstantinr also offered to move his Right Knee up to the 03/20/18 spot which he also agree to.  Patient is now scheduled on 02/27/18 and 03/20/18.   is sending patient an updated instruction sheet with the new dates and times.  
4 = No assist / stand by assistance

## 2023-09-05 LAB
ANION GAP SERPL CALC-SCNC: 3 MMOL/L — LOW (ref 5–17)
BUN SERPL-MCNC: 10 MG/DL — SIGNIFICANT CHANGE UP (ref 7–23)
CALCIUM SERPL-MCNC: 8.7 MG/DL — SIGNIFICANT CHANGE UP (ref 8.5–10.1)
CHLORIDE SERPL-SCNC: 107 MMOL/L — SIGNIFICANT CHANGE UP (ref 96–108)
CO2 SERPL-SCNC: 30 MMOL/L — SIGNIFICANT CHANGE UP (ref 22–31)
CREAT SERPL-MCNC: 0.7 MG/DL — SIGNIFICANT CHANGE UP (ref 0.5–1.3)
EGFR: 102 ML/MIN/1.73M2 — SIGNIFICANT CHANGE UP
GLUCOSE SERPL-MCNC: 92 MG/DL — SIGNIFICANT CHANGE UP (ref 70–99)
HCT VFR BLD CALC: 35.3 % — SIGNIFICANT CHANGE UP (ref 34.5–45)
HGB BLD-MCNC: 11.5 G/DL — SIGNIFICANT CHANGE UP (ref 11.5–15.5)
MAGNESIUM SERPL-MCNC: 2.1 MG/DL — SIGNIFICANT CHANGE UP (ref 1.6–2.6)
MCHC RBC-ENTMCNC: 26.3 PG — LOW (ref 27–34)
MCHC RBC-ENTMCNC: 32.6 G/DL — SIGNIFICANT CHANGE UP (ref 32–36)
MCV RBC AUTO: 80.8 FL — SIGNIFICANT CHANGE UP (ref 80–100)
NRBC # BLD: 0 /100 WBCS — SIGNIFICANT CHANGE UP (ref 0–0)
PHOSPHATE SERPL-MCNC: 2.9 MG/DL — SIGNIFICANT CHANGE UP (ref 2.5–4.5)
PLATELET # BLD AUTO: 250 K/UL — SIGNIFICANT CHANGE UP (ref 150–400)
POTASSIUM SERPL-MCNC: 3.8 MMOL/L — SIGNIFICANT CHANGE UP (ref 3.5–5.3)
POTASSIUM SERPL-SCNC: 3.8 MMOL/L — SIGNIFICANT CHANGE UP (ref 3.5–5.3)
RBC # BLD: 4.37 M/UL — SIGNIFICANT CHANGE UP (ref 3.8–5.2)
RBC # FLD: 14 % — SIGNIFICANT CHANGE UP (ref 10.3–14.5)
SODIUM SERPL-SCNC: 140 MMOL/L — SIGNIFICANT CHANGE UP (ref 135–145)
WBC # BLD: 5.63 K/UL — SIGNIFICANT CHANGE UP (ref 3.8–10.5)
WBC # FLD AUTO: 5.63 K/UL — SIGNIFICANT CHANGE UP (ref 3.8–10.5)

## 2023-09-05 PROCEDURE — 99232 SBSQ HOSP IP/OBS MODERATE 35: CPT

## 2023-09-05 PROCEDURE — 73718 MRI LOWER EXTREMITY W/O DYE: CPT | Mod: 26,LT

## 2023-09-05 PROCEDURE — 93970 EXTREMITY STUDY: CPT | Mod: 26

## 2023-09-05 PROCEDURE — 73721 MRI JNT OF LWR EXTRE W/O DYE: CPT | Mod: 26,LT,76

## 2023-09-05 PROCEDURE — 73718 MRI LOWER EXTREMITY W/O DYE: CPT | Mod: 26,LT,77

## 2023-09-05 RX ORDER — LIDOCAINE 4 G/100G
1 CREAM TOPICAL ONCE
Refills: 0 | Status: COMPLETED | OUTPATIENT
Start: 2023-09-05 | End: 2023-09-05

## 2023-09-05 RX ADMIN — TRAMADOL HYDROCHLORIDE 25 MILLIGRAM(S): 50 TABLET ORAL at 12:49

## 2023-09-05 RX ADMIN — GABAPENTIN 300 MILLIGRAM(S): 400 CAPSULE ORAL at 06:44

## 2023-09-05 RX ADMIN — LIDOCAINE 1 PATCH: 4 CREAM TOPICAL at 22:56

## 2023-09-05 RX ADMIN — HEPARIN SODIUM 5000 UNIT(S): 5000 INJECTION INTRAVENOUS; SUBCUTANEOUS at 06:43

## 2023-09-05 RX ADMIN — HEPARIN SODIUM 5000 UNIT(S): 5000 INJECTION INTRAVENOUS; SUBCUTANEOUS at 17:56

## 2023-09-05 RX ADMIN — Medication 81 MILLIGRAM(S): at 12:49

## 2023-09-05 RX ADMIN — Medication 650 MILLIGRAM(S): at 15:21

## 2023-09-05 RX ADMIN — FAMOTIDINE 20 MILLIGRAM(S): 10 INJECTION INTRAVENOUS at 17:57

## 2023-09-05 RX ADMIN — GABAPENTIN 300 MILLIGRAM(S): 400 CAPSULE ORAL at 17:57

## 2023-09-05 RX ADMIN — PREGABALIN 1000 MICROGRAM(S): 225 CAPSULE ORAL at 14:20

## 2023-09-05 NOTE — PHYSICAL THERAPY INITIAL EVALUATION ADULT - IMPAIRMENTS CONTRIBUTING TO GAIT DEVIATIONS, PT EVAL
impaired balance/decreased flexibility/narrow base of support/pain/decreased ROM/decreased sensation/impaired sensory feedback/decreased strength

## 2023-09-05 NOTE — OCCUPATIONAL THERAPY INITIAL EVALUATION ADULT - TRANSFER SAFETY CONCERNS NOTED: SIT/STAND, REHAB EVAL
stand pivot/decreased step length/stepping too close to front of assistive device/decreased weight-shifting ability

## 2023-09-05 NOTE — PHYSICAL THERAPY INITIAL EVALUATION ADULT - PHYSICAL ASSIST/NONPHYSICAL ASSIST: SIT/SUPINE, REHAB EVAL
L knee immobilizer and  L ankle splint donned/verbal cues/nonverbal cues (demo/gestures)/1 person assist

## 2023-09-05 NOTE — PHYSICAL THERAPY INITIAL EVALUATION ADULT - GENERAL OBSERVATIONS, REHAB EVAL
Pt was seen in semi-supine c L knee immobilizer and ankle soft splint + and Primafit donned, alert and Ox4. Pt c/o pain in LLE and knee immobilizer and ankle splnt were donned throughout P.T. intervention. OTEva present for safe pt handling. Pt was instructed to have WBAT to LLE c knee immobilizer and ankle splint donned and pt c verbal understanding.

## 2023-09-05 NOTE — OCCUPATIONAL THERAPY INITIAL EVALUATION ADULT - TRANSFER TRAINING, PT EVAL
Pt will transfer to all surfaces, safely and independently with rolling walker/  least restrictive adaptive device within 2 weeks.

## 2023-09-05 NOTE — OCCUPATIONAL THERAPY INITIAL EVALUATION ADULT - PERSONAL SAFETY AND JUDGMENT, REHAB EVAL
However ,pt needs verbal cues for proper hand placement and to safely maneuver rolling walker./intact

## 2023-09-05 NOTE — OCCUPATIONAL THERAPY INITIAL EVALUATION ADULT - NSOTDISCHREC_GEN_A_CORE
to prevent falls, optimize pt's ability for ADL management & safely navigate in all terrains/Sub-acute Rehab

## 2023-09-05 NOTE — OCCUPATIONAL THERAPY INITIAL EVALUATION ADULT - RANGE OF MOTION EXAMINATION, UPPER EXTREMITY
bilateral UE Active ROM was WNL (within normal limits)/bilateral UE Active ROM was WFL  (within functional limits)/bilateral UE Passive ROM was WFL  (within functional limits)

## 2023-09-05 NOTE — PHYSICAL THERAPY INITIAL EVALUATION ADULT - IMPAIRED TRANSFERS: SIT/STAND, REHAB EVAL
impaired balance/decreased flexibility/pain/decreased ROM/decreased sensation/impaired sensory feedback/decreased strength

## 2023-09-05 NOTE — OCCUPATIONAL THERAPY INITIAL EVALUATION ADULT - GENERAL OBSERVATIONS, REHAB EVAL
Pt was seen for initial OT consult, encountered in NAD. Left ankle splint and left knee immobilizer donned; pt was AA&Ox4, cooperative & followed commands. Pt c/o left knee pain radiating to ankle, numbness in left toes due to s/p fall on 9/4/23; these limits pt's activity tolerance ,balance, ADL management and functional mobility

## 2023-09-05 NOTE — PHYSICAL THERAPY INITIAL EVALUATION ADULT - ACTIVE RANGE OF MOTION EXAMINATION, REHAB EVAL
L knee and ankle N/T due to knee immobilizer and ankle splint donned due to pain/deficits as listed below

## 2023-09-05 NOTE — OCCUPATIONAL THERAPY INITIAL EVALUATION ADULT - LIVES WITH, PROFILE
Pt resided alone in a second floor apartment with ramp and elevator access. All living amenities are located on one level. The bathroom has a tub/shower combination, fixed shower head, tub bench  and standard toilet.

## 2023-09-05 NOTE — CONSULT NOTE ADULT - ASSESSMENT
A 54 yo F s/p fall   - Patient seen and evaluated   - neurovascular status intact   - diffuse tenderness along the lateral ankle (lateral retromalleolar > medial), limited ankle DF 2/2 pain, diffuse tenderness to the forefoot, pain with ankle eversion>inversion, L foot ecchymosis limited to the dorsum forefoot, diffuse ankle edema, no open wounds, no signs of infection, R foot no open wounds, no signs of infection  - b/l ankle and foot Xrays: no fracture  - L foot MRI: No fracture, possible Chronic sesamoiditis  - Left ankle MRI: No acute fracture, Mild peroneus brevis tendinosis with a split tear at the level of the lateral malleolus, Minimal posterior tibialis tendinosis without tear, Minimal distal Achilles tendinosis without tear, prior ankle sprain.  - b/l venous duplex: no DVT  - Pod plan: follow up on pain and joints range of motion  - Discussed with attending  A 54 yo F s/p fall   - Patient seen and evaluated   - neurovascular status intact   - diffuse tenderness along the lateral ankle (lateral retromalleolar > medial), limited ankle DF 2/2 pain, diffuse tenderness to the forefoot, pain with ankle eversion>inversion, L foot ecchymosis limited to the dorsum forefoot, diffuse ankle edema, no open wounds, no signs of infection, R foot no open wounds, no signs of infection  - b/l ankle and foot Xrays: no fracture  - L foot MRI: No fracture, possible Chronic sesamoiditis  - Left ankle MRI: No acute fracture, Mild peroneus brevis tendinosis with a split tear at the level of the lateral malleolus, Minimal posterior tibialis tendinosis without tear, Minimal distal Achilles tendinosis without tear, prior ankle sprain.  - b/l venous duplex: no DVT  - ordered cam boot  - No further podiatric intervention neccsary, stable for discharge from podiatry standpoint, please reconsult as needed   - Discussed with attending

## 2023-09-05 NOTE — PHYSICAL THERAPY INITIAL EVALUATION ADULT - GAIT DEVIATIONS NOTED, PT EVAL
decreased samantha/decreased velocity of limb motion/decreased step length/decreased stride length/decreased weight-shifting ability

## 2023-09-05 NOTE — OCCUPATIONAL THERAPY INITIAL EVALUATION ADULT - RANGE OF MOTION EXAMINATION, LOWER EXTREMITY
ROM is limited in left hip and toes/Right LE Active ROM was WNL(within normal limits)/Right LE Passive ROM was WNL (within normal limits)

## 2023-09-05 NOTE — OCCUPATIONAL THERAPY INITIAL EVALUATION ADULT - TRANSFER SAFETY CONCERNS NOTED: BED/CHAIR, REHAB EVAL
decreased balance during turns/decreased safety awareness/stand pivot/stepping too close to front of assistive device/decreased weight-shifting ability

## 2023-09-05 NOTE — CONSULT NOTE ADULT - CONSULT REASON
Left foot and ankle pain
Problem: Pain  Goal: Verbalizes/displays adequate comfort level or baseline comfort level  6/19/2023 1533 by Barb Hernandez RN  Outcome: Progressing  Pt reported pain this shift. PRN toradol given per MAR. Pt educated on importance of calling for pain meds when in pain. Pt verbalized understanding. Problem: Safety - Adult  Goal: Free from fall injury  6/19/2023 1533 by Barb Hernandez RN  Outcome: Progressing  Pt is a High fall risk. See Pama Peto Fall Score and ABCDS Injury Risk assessments. Explained fall risk precautions to pt and family and rationale behind their use to keep the patient safe. Pt bed is in low position, side rails up, call light and belongings are in reach. Fall wristband applied and present on pts wrist.  Bed alarm on. Pt encouraged to call for assistance. Will continue with hourly rounds for PO intake, pain needs, toileting and repositioning as needed. Problem: Discharge Planning  Goal: Discharge to home or other facility with appropriate resources  6/19/2023 1533 by Barb Hernandez RN  Outcome: Progressing  Pt aware and in agreement with discharge plan at this time. Problem: ABCDS Injury Assessment  Goal: Absence of physical injury  6/19/2023 1533 by Barb Hernandez RN  Outcome: Progressing  No new physical injuries noted.
L Knee/ankle pain  Consulted: [id number]  Seen: 1322
Daughter

## 2023-09-05 NOTE — PHYSICAL THERAPY INITIAL EVALUATION ADULT - MANUAL MUSCLE TESTING RESULTS, REHAB EVAL
grossly 3+/5 throughout  except  L knee and ankle N/T due to knee immobilizer and ankle splint donned due to pain and L hip 3-/5.

## 2023-09-05 NOTE — OCCUPATIONAL THERAPY INITIAL EVALUATION ADULT - BALANCE TRAINING, PT EVAL
Pt will increased standing balance from poor+ to fair+ in 3o days to prevent falls, optimize pt's ability for ADL management & safely navigate in all terrains

## 2023-09-05 NOTE — PHYSICAL THERAPY INITIAL EVALUATION ADULT - STANDING BALANCE: STATIC
with a rolling walker and  mod assist x2; L knee immobilizer and  L ankle splint donned/fair balance English

## 2023-09-05 NOTE — OCCUPATIONAL THERAPY INITIAL EVALUATION ADULT - SOCIAL CONCERNS
Pt voiced concerns about  recovery at home due to lack of support. Pt prefers to go home with rehab service instead of STR, ./Complex psychosocial needs/coping issues

## 2023-09-05 NOTE — PROGRESS NOTE ADULT - PROBLEM SELECTOR PLAN 1
keep leg in immobilizer    pain meds   for now   obtain mri to r/o occult fracture    PT eval  9/5/2023 f/u mri knee   doppler negative for DVT-either leg

## 2023-09-05 NOTE — OCCUPATIONAL THERAPY INITIAL EVALUATION ADULT - ADDITIONAL COMMENTS
Prior to admission, pt was functioning in her roles, self sufficient & ambulating independently with a rollator and a cane. Pt also own a rolling walker. Presently, pt needs assistance with lower body self care tasks due to pain, weakness, stiffness and decreased ROM from left knee and ankle injury sustained in aforementioned fall. Pt is right hand dominant and wears glasses for reading. .

## 2023-09-05 NOTE — OCCUPATIONAL THERAPY INITIAL EVALUATION ADULT - PERTINENT HX OF CURRENT PROBLEM, REHAB EVAL
For information on Fall & Injury Prevention, visit: [email or web address] OR  [email or web address] OR  https://www.cdc.gov/steadi/patient.html Pt is 52 y/o female who presented to ER due to a fall with injury to left knee and ankle. Pt has "h/o of multiple orthopedic issues including multiple surgeries for low back L4-5-S1, left knee arthroscopy, vertigo, orthostatic hypotension, vasovagal episode, multiple drug allergies and intolerances" -Pt is diagnosed with Bilateral knee pain, left ankle pain with inability to ambulate.  MRI of left ankle on 9/5/23  results confirm  No acute fracture.   2.  Mild peroneus brevis tendinosis with a split tear at the level of the   lateral malleolus.  3.  Minimal posterior tibialis tendinosis without tear.  4.  Minimal distal Achilles tendinosis without tear.

## 2023-09-05 NOTE — PHYSICAL THERAPY INITIAL EVALUATION ADULT - IMPAIRMENTS CONTRIBUTING IMPAIRED BED MOBILITY, REHAB EVAL
decreased flexibility/pain/decreased ROM/decreased sensation/impaired sensory feedback/decreased strength

## 2023-09-05 NOTE — CONSULT NOTE ADULT - SUBJECTIVE AND OBJECTIVE BOX
Patient is a 53yFemale ambulator with cane/walker at baseline due to vertigo, who presents to [facility] w/ a c/o of L knee/ankle/foot pain after a fall. Patient states she was at a wedding last night and slipped on a wet floor, causing her to land forward onto both of her knees and R elbow. Denies HH/LOC. States difficulty ambulating following the injury due to L knee/ankle pain. Pt endorses that the majority of her pain occurs around her L knee and slightly proximal/distal to it. Endorses mild numbness surrounding anterior aspect of proximal lower legs. Also endorses some pain by the L pinky toe and R elbow. Has had several spinal injections, but otherwise no orthopedic surgical history. Takes aspirin 81mg daily, no other blood thinners. No other orthopedic concerns at this time.    Vertigo    GERD (gastroesophageal reflux disease)    Vasovagal episode    Rheumatic fever    Mitral valve prolapse    Herniated disc, cervical    Degenerative disc disease, lumbar            Flexeril (Unknown)  Avelox (Unknown)  Venofer (Unknown)  cetirizine (Unknown)  fentanyl (Swelling)  Dramamine (Unknown)      PHYSICAL EXAM:  T(C): 36.7 (09-04-23 @ 04:41), Max: 36.7 (09-03-23 @ 20:03)  HR: 62 (09-04-23 @ 04:41) (62 - 72)  BP: 139/72 (09-04-23 @ 04:41) (133/67 - 139/72)  RR: 17 (09-04-23 @ 04:41) (16 - 18)  SpO2: 100% (09-04-23 @ 04:41) (100% - 100%)    Gen: NAD, Resting comfortably    LLE:  Knee immobilizer, foot air cast, and hard soled shoe removed for exam  Skin intact, no erythema or ecchymosis  Swelling noted most notably diffusely by the knee and proximal/lateral aspect of lower leg; mild swelling by ankle diffusely  Ankle and knee ROM limited by pain  Pt has mild TTP by the midfoot and proximal/lateral aspect of lower leg  Pt otherwise has no bony TTP throughout the remainder of the LLE, including the bilateral malleoli, tibia, patella, knee joint lines  No pain with axial load or log roll  Able to slightly SLR, but limited due to pain/swelling  No gross laxity with valgus/varus and Lachmans and anterior/posterior drawer of knee compared to contralateral side  Motor: + Quad/Fem/EHL/FHL/TA/GSC  +SILT: SPN/DPN/Yoselin/Saph/Tib  + DP/PT  Compartments soft and compressible  No calf tenderness    Secondary Assessment:  NC/AT, NTTP of clavicles, NTTP of C-,T-,L-Spine, NTTP of Pelvis  UEs: Mild swelling by olecranon, but no bony TTP and able to fully range elbow with minimal pain. NTTP of Shoulders, Elbows, Wrists, Hands; NT with AROM/PROM of Shoulders, Elbows, Wrists, Hands; AIN/PIN/Med/Uln/Msc/Rad/Ax intact  RLE: Able to SLR, NT with Log Roll, NT with Heel Strike, NTTP of Hip, Knee, Ankle, foot; NT with AROM/PROM of Hip, Knee, Ankle, foot; Q/H/Gsc/TA/EHL/FHL intact    Imaging:  Xray BL Knee/Tibia/Fibula/ankle/foot: Knee effusion noted. No obvious fractures or dislocations.    CT L Knee: No fracture or dislocation. Small simple joint effusion. Tricompartmental marginal osteophytes, with articular surface irregularity and small subchondral cysts patella.    A/P: 53F with L knee/ankle/foot pain s/p mechanical fall, most likely due to soft tissue injury    Analgesia as needed  WBAT, knee immobilizer and ace bandage around knee for comfort; air cast/hard sole shoe can be worn for comfort  PT/OT  If pt unable to bear any weight due to L knee pain despite 1-2 days of pain control, then could consider MRI of L knee to rule out occult fractures  DVT ppx per primary team  Ice and elevate extremity as tolerated  No acute orthopedic surgical intervention indicated at this time  Orthopedically stable for DC if pt able to bear weight after pain control or if future MRI is performed and rules out occult fractures about the L knee  Will discuss with Dr. Hu and adjust plan as needed  
Patient is a 53y old  Female who presents with a chief complaint of s/p fall left knee and ankle pain (05 Sep 2023 12:42)      HPI:  · Chief Complaint: The patient is a 53y Female complaining of fall.  · HPI Objective Statement: 53F with a PMHx of multiple orthopedic issues including multiple surgeries for low back L4-5-S1, left knee arthroscopy, vertigo, orthostatic hypotension, vasovagal episode, multiple drug allergies and intolerances - who presents with severe left knee  and ankle pain pain worse with movement, left ankle swelling and right elbow pain sp fall. states slip and fall at wedding, wearing heels. Denies head injury or LOC. Has chronic low back pain which she is following with PT. Notes numbness sensation anterior shins b/l mild     (04 Sep 2023 14:38)      PAST MEDICAL & SURGICAL HISTORY:  Vertigo      GERD (gastroesophageal reflux disease)      Vasovagal episode      Rheumatic fever      Mitral valve prolapse      Herniated disc, cervical      Degenerative disc disease, lumbar      History of partial hysterectomy      H/O myomectomy      S/P arthroscopic knee surgery          MEDICATIONS  (STANDING):  aspirin enteric coated 81 milliGRAM(s) Oral daily  cyanocobalamin 1000 MICROGram(s) Oral daily  famotidine    Tablet 20 milliGRAM(s) Oral two times a day  gabapentin 300 milliGRAM(s) Oral two times a day  heparin   Injectable 5000 Unit(s) SubCutaneous every 12 hours    MEDICATIONS  (PRN):  acetaminophen     Tablet .. 650 milliGRAM(s) Oral every 6 hours PRN Temp greater or equal to 38C (100.4F), Mild Pain (1 - 3)  traMADol 25 milliGRAM(s) Oral every 6 hours PRN Moderate Pain (4 - 6)      Allergies    Flexeril (Unknown)  Avelox (Unknown)  oxycodone (Unknown)  Venofer (Unknown)  cetirizine (Unknown)  fentanyl (Swelling)  Dramamine (Unknown)    Intolerances        VITALS:    Vital Signs Last 24 Hrs  T(C): 36.4 (05 Sep 2023 05:24), Max: 37 (04 Sep 2023 22:47)  T(F): 97.6 (05 Sep 2023 05:24), Max: 98.6 (04 Sep 2023 22:47)  HR: 90 (05 Sep 2023 14:50) (60 - 90)  BP: 126/72 (05 Sep 2023 14:50) (114/74 - 130/60)  BP(mean): --  RR: 18 (05 Sep 2023 14:20) (18 - 18)  SpO2: 98% (05 Sep 2023 14:50) (98% - 100%)    Parameters below as of 05 Sep 2023 14:50  Patient On (Oxygen Delivery Method): room air        LABS:                          11.5   5.63  )-----------( 250      ( 05 Sep 2023 06:10 )             35.3       09-05    140  |  107  |  10  ----------------------------<  92  3.8   |  30  |  0.70    Ca    8.7      05 Sep 2023 06:10  Phos  2.9     09-05  Mg     2.1     09-05    TPro  7.4  /  Alb  3.2<L>  /  TBili  0.2  /  DBili  x   /  AST  18  /  ALT  16  /  AlkPhos  82  09-04      CAPILLARY BLOOD GLUCOSE              LOWER EXTREMITY PHYSICAL EXAM:    Vascular: DP/PT 2/4, B/L, CFT <3seconds B/L, Temperature gradient warm to cool, B/L.   Neuro: Epicritic sensation intact to the level of digits, B/L.  Musculoskeletal/Ortho: diffuse tenderness along the lateral ankle (lateral retromalleolar > medial), limited ankle DF 2/2 pain, diffuse tenderness to the forefoot, pain with ankle eversion>inversion  Skin: L foot ecchymosis limited to the dorsum forefoot, diffuse ankle edema, no open wounds, no signs of infection, R foot no open wounds, no signs of infection      RADIOLOGY & ADDITIONAL STUDIES:    < from: MR Ankle No Cont, Left (09.05.23 @ 11:47) >    ACC: [id number] EXAM:  MR ANKLE LT   ORDERED BY: SHANNEN REA     PROCEDURE DATE:  09/05/2023          INTERPRETATION:  Exam Type: MR ANKLE LEFT  Exam Date and Time: 9/5/2023 11:47 AM  Indication: Fall with foot and ankle pain concern for fracture  Comparison: Left ankle MRI on 5/4/2022    TECHNIQUE:  Multiplanar multisequence MRI of the left ankle was performed.    FINDINGS:  BONES/JOINTS:  Normal hindfoot alignment on this non-weightbearing examination. No acute   fracture, bone bruise, or osseous stress response. No tarsal coalition.   Mild osteoarthritis of the talonavicular and calcaneocuboid articulations   with mild subchondral edema. No osteochondral lesion. No joint effusions.    LATERAL LIGAMENTS:  Anterior talofibular ligament (ATFL): Chronically sprained.  Calcaneofibular ligament (CFL): Chronically sprained.  Posterior talofibular ligament (PTFL): Normal.  Tibiofibular syndesmosis: Normal anterior and posterior syndesmotic   ligaments and interosseous membrane.    LATERAL (PERONEAL) TENDONS:  Peroneus longus: No tendinosis or tear. No tenosynovitis.  Peroneus brevis: Mild peroneus brevis tendinosis with a split tear at the   level of the lateral malleolus. No tenosynovitis.    MEDIAL LIGAMENTS:  Deltoid ligament: Normal.  Spring ligament: Degeneration.    MEDIAL (FLEXOR) TENDONS:  Posterior tibialis: Minimal posterior tibialis tendinosis without tear.   No tenosynovitis.  Flexor digitorum longus: No tendinosis or tear. No tenosynovitis.  Flexor hallucis longus: No tendinosis or tear. No tenosynovitis.    ANTERIOR (EXTENSOR) TENDONS:  Anterior tibialis: No tendinosis or tear. No tenosynovitis.  Extensor hallucis longus: No tendinosis or tear. No tenosynovitis.  Extensor digitorum longus: No tendinosis or tear. Notenosynovitis.    ACHILLES TENDON: Minimal distal Achilles tendinosis without tear. No   paratenonitis. No retrocalcaneal or retro-Achilles bursitis.    PLANTAR FASCIA: No thickening or perifascial edema. No plantar fascia   tear.    OTHER POSTERIOR/SOFT TISSUE STRUCTURES:  Sinus tarsi: Preservation of the normal sinus tarsi fat.  Tarsal tunnel: Unimpinged; no mass effect.  Muscles: No atrophy or edema.  Soft tissues: No fluid collection or soft tissue mass.    IMPRESSION:  1.  No acute fracture.  2.  Mild peroneus brevis tendinosis with a split tear at the level of the   lateral malleolus.  3.  Minimal posterior tibialis tendinosis without tear.  4.  Minimal distal Achilles tendinosis without tear.  5.  MR findings prior ankle sprain.    ---End of Report ---            TONIO MARIN DO; Attending Radiologist  This document has been electronically signed. Sep  5 2023 12:52PM    < end of copied text >  < from: Xray Foot AP + Lateral + Oblique, Bilat (09.04.23 @ 01:03) >    ACC: [id number] EXAM:  XR KNEE COMP 4+ VIEWS BI   ORDERED BY: JESSICA ARENAS     ACC: [id number] EXAM:  XR ANKLE COMP MIN 3 VIEWS BI   ORDERED BY: JESSICA ARENAS     ACC: [id number] EXAM:  XR TIB FIB AP LAT 2 VIEWS BI   ORDERED BY: JESSICA ARENAS     ACC: [id number] EXAM:  XR FOOT COMP MIN 3 VIEWS BI   ORDERED BY: JESSICA ARENAS     PROCEDURE DATE:  09/04/2023          INTERPRETATION:  Bilateral tib-fib AP and lateral projections, bilateral   knee 3 views each, bilateral foot and bilateral ankle 3 views each    CLINICAL HISTORY: Pain status post fall    FINDINGS:    No evidence of fracture or suspicious lesion.    Ankle mortise is maintained.    DJD of the right knee greater than left with medial joint compartment   narrowing and medial productive change.    Dorsal and plantar calcaneal spurs bilaterally.    Bunion formation at the bilateral first metatarsal head.    No suprapatellar effusions are identified.    IMPRESSION: No evidence of fracture or suspicious lesion.    Degenerative changes as above.    --- End of Report ---            CON CISNEROS MD; Attending Radiologist  This document has been electronically signed. Sep  4 2023 11:48AM    < end of copied text >

## 2023-09-05 NOTE — OCCUPATIONAL THERAPY INITIAL EVALUATION ADULT - LEVEL OF INDEPENDENCE: TOILET, REHAB EVAL
[facility]                                                 Chief Complaint  46 y.o. female with Rash    History of Present Illness  Yasmine Wilson presents to the emergency room with a nonspecific rash  Patient on exam has a nonspecific rash on her arms and legs, she is a   The patient states she was exposed to possible poison ivy versus other this week  Patient on exam has no obvious cellulitis, no hives or welts, no abscess or drainage  Patient has no history of psoriasis or eczema, has no history of rash per interview    The history is provided by the patient   device was not used during this ER visit  Medical history: Anxiety, depression, hypertension, thyroid disease  Surgeries: Cholecystectomy and tubal ligation  Allergies: Wasp sting, amoxicillin and Vicodin    Review of Systems and Physical Exam      Review of Systems  -- Constitution - no fever, denies fatigue, no weakness, no chills  -- Eyes - no tearing or redness, no visual disturbance  -- Ear, Nose - no tinnitus or earache, no nasal congestion or discharge  -- Mouth,Throat - no sore throat, no toothache, normal voice, normal swallowing  -- Respiratory - denies cough and congestion, no shortness of breath, no OGDEN  -- Cardiovascular - denies chest pain, no palpitations, denies claudication  -- Gastrointestinal - denies abdominal pain, nausea, vomiting, or diarrhea  -- Genitourinary - no dysuria, no hematuria, no flank pain, no bladder pain  -- Musculoskeletal - denies back pain, negative for myalgias and arthralgias   -- Neurological - no headache, denies weakness or seizure; no LOC  -- Skin - nonspecific rash on the trunk and extremities    Vital Signs  Her oral temperature is 97.3 °F (36.3 °C).   Her blood pressure is 141/72 and her pulse is 90.   Her respiration is 18 and oxygen saturation is 100%.     Physical Exam  -- Nursing note and vitals reviewed  -- Constitutional: Appears  well-developed and well-nourished  -- Head: Atraumatic. Normocephalic. No obvious abnormality  -- Eyes: Pupils are equal and reactive to light. Normal conjunctiva and lids  -- Nose: Nose normal in appearance, nares grossly normal. No discharge  -- Throat: Mucous membranes moist, pharynx normal, normal tonsils. No lesions   -- Ears: External ears and TM normal bilaterally. Normal hearing and no drainage  -- Neck: Normal range of motion. Neck supple. No masses, trachea midline  -- Cardiac: Normal rate, regular rhythm and normal heart sounds  -- Pulmonary: Normal respiratory effort, breath sounds clear to auscultation  -- Abdominal: Soft, no tenderness. Normal bowel sounds. Normal liver edge  -- Musculoskeletal: Normal range of motion, no effusions. Joints stable   -- Neurological: No focal deficits. Showed good interaction with staff  -- Skin:  Nonspecific papular rash on the arms and legs    Emergency Room Course      Treatment and Evaluation  --  mg Solumedrol given today in the ER  -- IM 50 mg Benadryl given today in the ER    Diagnosis  -- The encounter diagnosis was Rash and nonspecific skin eruption.    Disposition and Plan  -- Disposition: home  -- Condition: stable  -- Follow-up: Patient to follow up with Joe Aldridge MD in 1-2 days.  -- I advised the patient that we have found no life threatening condition today  -- At this time, I believe the patient is clinically stable for discharge.   -- The patient acknowledges that close follow up with a MD is required   -- Patient agrees to comply with all instruction and direction given in the ER    This note is dictated on Dragon Natural Speaking word recognition program.  There are word recognition mistakes that are occasionally missed on review.          Reynold Jones MD  09/22/18 4360     moderate assist (50% patients effort)

## 2023-09-05 NOTE — OCCUPATIONAL THERAPY INITIAL EVALUATION ADULT - STRENGTHENING, PT EVAL
Pt will increased BUE/RLE muscle strength by 1 full grade to maximize independence with functional mobility and self care tasks within 30days

## 2023-09-06 ENCOUNTER — TRANSCRIPTION ENCOUNTER (OUTPATIENT)
Age: 53
End: 2023-09-06

## 2023-09-06 PROCEDURE — 99233 SBSQ HOSP IP/OBS HIGH 50: CPT

## 2023-09-06 RX ADMIN — PREGABALIN 1000 MICROGRAM(S): 225 CAPSULE ORAL at 12:08

## 2023-09-06 RX ADMIN — FAMOTIDINE 20 MILLIGRAM(S): 10 INJECTION INTRAVENOUS at 17:36

## 2023-09-06 RX ADMIN — HEPARIN SODIUM 5000 UNIT(S): 5000 INJECTION INTRAVENOUS; SUBCUTANEOUS at 06:12

## 2023-09-06 RX ADMIN — TRAMADOL HYDROCHLORIDE 25 MILLIGRAM(S): 50 TABLET ORAL at 12:30

## 2023-09-06 RX ADMIN — TRAMADOL HYDROCHLORIDE 25 MILLIGRAM(S): 50 TABLET ORAL at 20:19

## 2023-09-06 RX ADMIN — Medication 81 MILLIGRAM(S): at 12:08

## 2023-09-06 RX ADMIN — FAMOTIDINE 20 MILLIGRAM(S): 10 INJECTION INTRAVENOUS at 06:13

## 2023-09-06 RX ADMIN — GABAPENTIN 300 MILLIGRAM(S): 400 CAPSULE ORAL at 17:36

## 2023-09-06 RX ADMIN — GABAPENTIN 300 MILLIGRAM(S): 400 CAPSULE ORAL at 06:12

## 2023-09-06 RX ADMIN — TRAMADOL HYDROCHLORIDE 25 MILLIGRAM(S): 50 TABLET ORAL at 00:10

## 2023-09-06 RX ADMIN — HEPARIN SODIUM 5000 UNIT(S): 5000 INJECTION INTRAVENOUS; SUBCUTANEOUS at 17:36

## 2023-09-06 RX ADMIN — TRAMADOL HYDROCHLORIDE 25 MILLIGRAM(S): 50 TABLET ORAL at 12:27

## 2023-09-06 RX ADMIN — TRAMADOL HYDROCHLORIDE 25 MILLIGRAM(S): 50 TABLET ORAL at 01:10

## 2023-09-06 NOTE — DISCHARGE NOTE PROVIDER - NSDCFUADDINST_GEN_ALL_CORE_FT
1) It is important to see your primary physician as well as other necessary consultants within next 7-10 days to perform a comprehensive medical review.  Call their offices for an appointment as soon as you leave the hospital.  If you do not have a primary physician or unable to reach your PCP, contact the [facility] Physician Referral Service at [phone].  Your medical issues appear to be stable at this time, but if your symptoms recur or worsen, contact your physicians and/or return to the hospital if necessary.  If you encounter any issues or questions with your medication, call your physicians before stopping the medication.    2) Please access [facility] Patient portal (as instructed on the discharge paperwork) to access your medical records at any time after discharge.     Please continue with your home brand meds at rehab if possible to get meds from home. Please follow up with your allergists about outpatient iv infusion for your allergies.

## 2023-09-06 NOTE — DISCHARGE NOTE PROVIDER - NSDCFUADDAPPT_GEN_ALL_CORE_FT
Podiatry Discharge Instructions:  Follow up: Please follow up with Dr. Waterhouse within 1 week of discharge from the hospital, please call [phone] for appointment and discuss that you recently were seen in the hospital.  Wound Care: Please leave your dressing clean dry intact until your follow up appointment   Weight bearing: Please weight bear as tolerated in a CAM to left lower extremity.

## 2023-09-06 NOTE — DISCHARGE NOTE PROVIDER - CARE PROVIDER_API CALL
pmd,   Phone: (   )    -  Fax: (   )    -  Follow Up Time:     Barrett Hu  Orthopaedic Surgery  [facility], [address] [address]  Phone: [phone]  Fax: [phone]  Follow Up Time:     Waterhouse, Joseph Cameron  Podiatric Medicine and Surgery  [address] [place] [id number]  Phone: [phone]  Fax: [phone]  Follow Up Time:

## 2023-09-06 NOTE — PROGRESS NOTE ADULT - ASSESSMENT
53F with a PMHx of multiple orthopedic issues including multiple surgeries for low back L4-5-S1, left knee arthroscopy, vertigo, orthostatic hypotension, vasovagal episode, multiple drug allergies and intolerances - who presents with severe left knee  and ankle pain pain worse with movement, left ankle swelling and right elbow pain sp fall. states slip and fall at wedding, wearing heels. Denies head injury or LOC. Has chronic low back pain which she is following with PT. Notes numbness sensation anterior shins b/l mild.      Problem/Plan - 1:  ·  Problem: severe Pain in left knee.   ·  Plan: keep left  leg in immobilizer   PT re-eval pending. I called and discussed with ortho service about MRI knee finding of Radial tear at the junction of the posterior horn and posterior root of the lateral meniscus with degeneration throughout the remaining   meniscus. Ortho will evaluate but likely conservative management with PT and outpatient interval ortho follow up is recommended.      Problem/Plan - 2:  ·  Problem: Vertigo.   ·  Plan: supportive care and fall precautions.        Problem/Plan - 3:  ·  Problem: Degenerative disc disease, lumbar.   ·  Plan: supportive care.     Problem/Plan - 4:  ·  Problem: Left ankle pain.   ·  Plan: Likely chronic sesmoiditis per podiatry. no acute intervention was recommended.  CAM boot ordered.     OA knee. PT and pain control.     Obesity class 2. diet and exercise.     Full code.   HSQ   Likely rehab.     Time spent by me managing the patient (including chart review) 56 mins

## 2023-09-06 NOTE — DISCHARGE NOTE PROVIDER - PROVIDER TOKENS
FREE:[LAST:[pmd],PHONE:[(   )    -],FAX:[(   )    -]],PROVIDER:[TOKEN:[[id number]]],PROVIDER:[TOKEN:[[id number]]]

## 2023-09-06 NOTE — DISCHARGE NOTE PROVIDER - HOSPITAL COURSE
53F with a PMHx of multiple orthopedic issues including multiple surgeries for low back L4-5-S1, left knee arthroscopy, vertigo, orthostatic hypotension, vasovagal episode, multiple drug allergies and intolerances - who presents with severe left knee  and ankle pain pain worse with movement, left ankle swelling and right elbow pain sp fall. states slip and fall at wedding, wearing heels. Denies head injury or LOC. Has chronic low back pain which she is following with PT. Notes numbness sensation anterior shins b/l mild  Problem/Plan - 1:  ·  Problem: Pain in left knee.   ·  Plan: keep leg in immobilizer    pain meds   for now   obtain mri to r/o occult fracture    PT eval  9/5/2023 f/u mri knee   doppler negative for DVT-either leg.  Problem/Plan - 2:  ·  Problem: Vertigo.   ·  Plan: supportive acre  check  orthostatic once ambulation.  Problem/Plan - 3:  ·  Problem: Degenerative disc disease, lumbar.   ·  Plan: supportive care.  Problem/Plan - 4:  ·  Problem: Left ankle pain.   ·  Plan: as above for left knee pain  9/5/2023 f/u mri ankle.    SEEN BY ORTHO:  A/P: 53F with L knee/ankle/foot pain s/p mechanical fall, most likely due to soft tissue injury  Analgesia as needed  WBAT, knee immobilizer and ace bandage around knee for comfort; air cast/hard sole shoe can be worn for comfort  PT/OT  If pt unable to bear any weight due to L knee pain despite 1-2 days of pain control, then could consider MRI of L knee to rule out occult fractures  DVT ppx per primary team  Ice and elevate extremity as tolerated  No acute orthopedic surgical intervention indicated at this time  Orthopedically stable for DC if pt able to bear weight after pain control or if future MRI is performed and rules out occult fractures about the L knee  Will discuss with Dr. Hu and adjust plan as needed  Barrett Hu)      SEEN BY PODIATRY:  A 54 yo F s/p fall   - Patient seen and evaluated   - neurovascular status intact   - diffuse tenderness along the lateral ankle (lateral retromalleolar > medial), limited ankle DF 2/2 pain, diffuse tenderness to the forefoot, pain with ankle eversion>inversion, L foot ecchymosis limited to the dorsum forefoot, diffuse ankle edema, no open wounds, no signs of infection, R foot no open wounds, no signs of infection  - b/l ankle and foot X-rays: no fracture  - L foot MRI: No fracture, possible Chronic sesamoiditis  - Left ankle MRI: No acute fracture, Mild peroneus brevis tendinosis with a split tear at the level of the lateral malleolus, Minimal posterior tibialis tendinosis without tear, Minimal distal Achilles tendinosis without tear, prior ankle sprain.  - b/l venous duplex: no DVT  - ordered cam boot  - No further podiatric intervention necessary, stable for discharge from podiatry standpoint, please reconsult as needed   - Discussed with attending   Waterhouse, Joseph (DAVID) 53F with a PMHx of multiple orthopedic issues including multiple surgeries for low back L4-5-S1, left knee arthroscopy, vertigo, orthostatic hypotension, vasovagal episode, multiple drug allergies and intolerances - who presents with severe left knee  and ankle pain pain worse with movement, left ankle swelling and right elbow pain sp fall. states slip and fall at wedding, wearing heels. Denies head injury or LOC. Has chronic low back pain which she is following with PT. Notes numbness sensation anterior shins b/l mild.      Problem/Plan - 1:  ·  Problem: severe Pain in left knee. OA.   ·  Plan: keep left  leg in immobilizer   PT recommended KB. I called and discussed with ortho service about MRI knee finding of Radial tear at the junction of the posterior horn and posterior root of the lateral meniscus with degeneration throughout the remaining   meniscus. conservative management per ortho and podiatrist. Discussed with the patient. Patient will follow up with her own orthopedician and PCP.      Problem/Plan - 2:  ·  Problem: Vertigo.   ·  Plan: supportive care and fall precautions.       Problem/Plan - 3:  ·  Problem: Degenerative disc disease, lumbar.   ·  Plan: supportive care. KB and pain meds. outpatient ortho follow up is recommended.      Problem/Plan - 4:  ·  Problem: Left ankle pain.   ·  Plan: Likely chronic sesmoiditis per podiatry. no acute intervention was recommended.  CAM boot received.      Obesity class 2. diet and exercise.     Hx of anaphylaxis and allergies in the past. recommended to continue with home meds at rehab and follow up with allergist for outpatient iv infusions     Full code.   Seen and examined by me today. Vitals stable.   All questions welcomed and answered appropriately. Patient verbalized understanding of post discharge physician's follows up and discharge instructions.   DC time spent by me face to face excluding billable procedures 38 mins 53F with a PMHx of multiple orthopedic issues including multiple surgeries for low back L4-5-S1, left knee arthroscopy, vertigo, orthostatic hypotension, vasovagal episode, multiple drug allergies and intolerances - who presents with severe left knee  and ankle pain pain worse with movement, left ankle swelling and right elbow pain sp fall. states slip and fall at wedding, wearing heels. Denies head injury or LOC. Has chronic low back pain which she is following with PT. Notes numbness sensation anterior shins b/l mild.    course:  Problem/Plan - 1:  ·  Problem: severe Pain in left knee. OA.   ·  Plan: keep left  leg in immobilizer   PT recommended KB. I called and discussed with ortho service about MRI knee finding of Radial tear at the junction of the posterior horn and posterior root of the lateral meniscus with degeneration throughout the remaining   meniscus. conservative management per ortho and podiatrist. Discussed with the patient. Patient will follow up with her own orthopedician and PCP.      Problem/Plan - 2:  ·  Problem: Vertigo.   ·  Plan: supportive care and fall precautions.       Problem/Plan - 3:  ·  Problem: Degenerative disc disease, lumbar.   ·  Plan: supportive care. KB and pain meds. outpatient ortho follow up is recommended.      Problem/Plan - 4:  ·  Problem: Left ankle pain.   ·  Plan: Likely chronic sesmoiditis per podiatry. no acute intervention was recommended.  CAM boot received.      Obesity class 2. diet and exercise.     Hx of anaphylaxis and allergies in the past. recommended to continue with home meds at rehab and follow up with allergist for outpatient iv infusions     Full code.   Seen and examined by me today. Vitals stable.   All questions welcomed and answered appropriately. Patient verbalized understanding of post discharge physician's follows up and discharge instructions.   DC time spent by me face to face excluding billable procedures 38 mins

## 2023-09-06 NOTE — DISCHARGE NOTE PROVIDER - NSDCCPCAREPLAN_GEN_ALL_CORE_FT
PRINCIPAL DISCHARGE DIAGNOSIS  Diagnosis: Bilateral knee pain  Assessment and Plan of Treatment: Continue current treatment follow up with pmd.      SECONDARY DISCHARGE DIAGNOSES  Diagnosis: Left ankle sprain  Assessment and Plan of Treatment:     Diagnosis: Unable to ambulate  Assessment and Plan of Treatment:      PRINCIPAL DISCHARGE DIAGNOSIS  Diagnosis: Acute tear of posterior horn of lateral meniscus  Assessment and Plan of Treatment:       SECONDARY DISCHARGE DIAGNOSES  Diagnosis: Left ankle sprain  Assessment and Plan of Treatment:     Diagnosis: Bilateral knee pain  Assessment and Plan of Treatment: Continue current treatment follow up with pmd.    Diagnosis: Unable to ambulate  Assessment and Plan of Treatment:     Diagnosis: Tendinitis of peroneus brevis tendon, left  Assessment and Plan of Treatment:     Diagnosis: Peroneal tendonosis  Assessment and Plan of Treatment:      PRINCIPAL DISCHARGE DIAGNOSIS  Diagnosis: Acute tear of posterior horn of lateral meniscus  Assessment and Plan of Treatment:       SECONDARY DISCHARGE DIAGNOSES  Diagnosis: Left ankle sprain  Assessment and Plan of Treatment:     Diagnosis: Bilateral knee pain  Assessment and Plan of Treatment: Continue current treatment follow up with pmd.    Diagnosis: Unable to ambulate  Assessment and Plan of Treatment:     Diagnosis: Tendinitis of peroneus brevis tendon, left  Assessment and Plan of Treatment:     Diagnosis: Peroneal tendonosis  Assessment and Plan of Treatment:     Diagnosis: Peroneal tendon tear  Assessment and Plan of Treatment:     Diagnosis: Radial tear of lateral meniscus  Assessment and Plan of Treatment:

## 2023-09-06 NOTE — DISCHARGE NOTE PROVIDER - NSDCMRMEDTOKEN_GEN_ALL_CORE_FT
acetaminophen 325 mg oral capsule: 2 cap(s) orally every 4 to 6 hours   acetaminophen 325 mg oral tablet, chewable: 2 tab(s) orally every 6 hours, As Needed for pain   acetaminophen 500 mg oral tablet: 2 tab(s) orally 3 times a day   ammonium lactate 12% topical cream: Apply topically to affected area 2 times a day  Bonine 25 mg oral tablet, chewable:  orally once a day, As Needed  D3 1000 oral tablet: 5 tab(s) orally once a day  Diflucan 150 mg oral tablet: 1 tab(s) orally once a day   Ferrex-150 oral capsule: 1 cap(s) orally 2 times a day  ibuprofen 400 mg oral tablet: 1 tab(s) orally every 6 hours, As Needed for pain   ibuprofen 400 mg oral tablet: 1 tab(s) orally every 6 hours   ipratropium 42 mcg/inh (0.06%) nasal spray: 2 spray(s) intranasally 4 times a day   Lasix 20 mg oral tablet: 1 tab(s) orally once a day   multivitamin:     nabumetone 500 mg oral tablet: 1 tab(s) orally 2 times a day  ondansetron 4 mg oral tablet, disintegratin tab(s) orally 3 times a day   Pepcid 20 mg oral tablet: 1 tab(s) orally 2 times a day  predniSONE 50 mg oral tablet: 1 tab(s) orally once a day   pseudoephedrine 30 mg oral capsule: 1 cap(s) orally every 4 to 6 hours -for congestion   ranitidine 300 mg oral tablet: 1 tab(s) orally once a day (at bedtime)  Tylenol 500 mg oral tablet: 2 tab(s) orally every 6 hours, As Needed  ZyrTEC 10 mg oral tablet: 1 tab(s) orally once a day   acetaminophen 325 mg oral capsule: 2 cap(s) orally every 4 to 6 hours   acetaminophen 325 mg oral tablet, chewable: 2 tab(s) orally every 6 hours, As Needed for pain   acetaminophen 500 mg oral tablet: 2 tab(s) orally 3 times a day   ammonium lactate 12% topical cream: Apply topically to affected area 2 times a day  Bonine 25 mg oral tablet, chewable:  orally once a day, As Needed  D3 1000 oral tablet: 5 tab(s) orally once a day  Diflucan 150 mg oral tablet: 1 tab(s) orally once a day   Ferrex-150 oral capsule: 1 cap(s) orally 2 times a day  ibuprofen 400 mg oral tablet: 1 tab(s) orally every 6 hours, As Needed for pain   ibuprofen 400 mg oral tablet: 1 tab(s) orally every 6 hours   ipratropium 42 mcg/inh (0.06%) nasal spray: 2 spray(s) intranasally 4 times a day   Lasix 20 mg oral tablet: 1 tab(s) orally once a day   Left lower extremity CAM walker boot: Please dispense left lower CAM boot to be worn as indicated. Dx: Left ankle pain  multivitamin:     nabumetone 500 mg oral tablet: 1 tab(s) orally 2 times a day  ondansetron 4 mg oral tablet, disintegratin tab(s) orally 3 times a day   Pepcid 20 mg oral tablet: 1 tab(s) orally 2 times a day  predniSONE 50 mg oral tablet: 1 tab(s) orally once a day   pseudoephedrine 30 mg oral capsule: 1 cap(s) orally every 4 to 6 hours -for congestion   ranitidine 300 mg oral tablet: 1 tab(s) orally once a day (at bedtime)  Tylenol 500 mg oral tablet: 2 tab(s) orally every 6 hours, As Needed  ZyrTEC 10 mg oral tablet: 1 tab(s) orally once a day   ammonium lactate 12% topical cream: Apply topically to affected area 2 times a day  aspirin 81 mg oral delayed release tablet: 1 tab(s) orally once a day  Bonine 25 mg oral tablet, chewable:  orally once a day, As Needed  cyanocobalamin 1000 mcg oral tablet: 1 tab(s) orally once a day  D3 1000 oral tablet: 5 tab(s) orally once a day  Ferrex-150 oral capsule: 1 cap(s) orally 2 times a day  gabapentin 300 mg oral capsule: 1 cap(s) orally 2 times a day  ipratropium 42 mcg/inh (0.06%) nasal spray: 2 spray(s) intranasally 4 times a day   Lasix 20 mg oral tablet: 1 tab(s) orally once a day   Left lower extremity CAM walker boot: Please dispense left lower CAM boot to be worn as indicated. Dx: Left ankle pain  multivitamin:     ondansetron 4 mg oral tablet, disintegratin tab(s) orally 3 times a day   Pepcid 20 mg oral tablet: 1 tab(s) orally 2 times a day  pseudoephedrine 30 mg oral capsule: 1 cap(s) orally every 4 to 6 hours -for congestion   traMADol 50 mg oral tablet: 0.5 tab(s) orally every 6 hours As needed Moderate Pain (4 - 6)  Tylenol 500 mg oral tablet: 2 tab(s) orally every 6 hours, As Needed  ZyrTEC 10 mg oral tablet: 1 tab(s) orally once a day   ammonium lactate 12% topical cream: Apply topically to affected area 2 times a day  aspirin 81 mg oral delayed release tablet: 1 tab(s) orally once a day  Bonine 25 mg oral tablet, chewable:  orally once a day, As Needed  cyanocobalamin 1000 mcg oral tablet: 1 tab(s) orally once a day  cyclobenzaprine 10 mg oral tablet: 1 orally 3 times a day  cyclobenzaprine 10 mg oral tablet: 1 orally 3 times a day as needed for  muscle spasm  D3 1000 oral tablet: 5 tab(s) orally once a day  Diclofenac Ointment 1%: Apply to neck back legs and left foot four times daily as needed  diclofenac sodium 75 mg oral delayed release tablet: 1 orally 2 times a day as needed for  moderate pain  Ferrex-150 oral capsule: 1 cap(s) orally 2 times a day  fexofenadine 180 mg oral tablet: 1 orally 2 times a day  Florajen: Once daily prior to all other medications in am  gabapentin 300 mg oral capsule: 1 cap(s) orally 2 times a day  ipratropium 42 mcg/inh (0.06%) nasal spray: 2 spray(s) intranasally 4 times a day   Lasix 20 mg oral tablet: 1 tab(s) orally once a day   Left lower extremity CAM walker boot: Please dispense left lower CAM boot to be worn as indicated. Dx: Left ankle pain  multivitamin:     ondansetron 4 mg oral tablet, disintegratin tab(s) orally 3 times a day   Pepcid 20 mg oral tablet: 1 tab(s) orally 2 times a day  pseudoephedrine 30 mg oral capsule: 1 cap(s) orally every 4 to 6 hours -for congestion   simethicone 125 mg oral capsule: 1 orally 4 times a day as needed for  indigestion  traMADol 50 mg oral tablet: 0.5 tab(s) orally every 6 hours As needed Moderate Pain (4 - 6)  Tylenol 500 mg oral tablet: 2 tab(s) orally every 6 hours, As Needed  ZyrTEC 10 mg oral tablet: 1 tab(s) orally once a day

## 2023-09-07 PROCEDURE — 99232 SBSQ HOSP IP/OBS MODERATE 35: CPT

## 2023-09-07 RX ADMIN — Medication 650 MILLIGRAM(S): at 11:10

## 2023-09-07 RX ADMIN — GABAPENTIN 300 MILLIGRAM(S): 400 CAPSULE ORAL at 17:41

## 2023-09-07 RX ADMIN — TRAMADOL HYDROCHLORIDE 25 MILLIGRAM(S): 50 TABLET ORAL at 05:25

## 2023-09-07 RX ADMIN — PREGABALIN 1000 MICROGRAM(S): 225 CAPSULE ORAL at 11:11

## 2023-09-07 RX ADMIN — FAMOTIDINE 20 MILLIGRAM(S): 10 INJECTION INTRAVENOUS at 05:22

## 2023-09-07 RX ADMIN — FAMOTIDINE 20 MILLIGRAM(S): 10 INJECTION INTRAVENOUS at 17:41

## 2023-09-07 RX ADMIN — TRAMADOL HYDROCHLORIDE 25 MILLIGRAM(S): 50 TABLET ORAL at 15:56

## 2023-09-07 RX ADMIN — GABAPENTIN 300 MILLIGRAM(S): 400 CAPSULE ORAL at 05:22

## 2023-09-07 RX ADMIN — HEPARIN SODIUM 5000 UNIT(S): 5000 INJECTION INTRAVENOUS; SUBCUTANEOUS at 05:22

## 2023-09-07 RX ADMIN — Medication 81 MILLIGRAM(S): at 11:11

## 2023-09-07 RX ADMIN — HEPARIN SODIUM 5000 UNIT(S): 5000 INJECTION INTRAVENOUS; SUBCUTANEOUS at 17:40

## 2023-09-07 NOTE — PROGRESS NOTE ADULT - ASSESSMENT
53F with a PMHx of multiple orthopedic issues including multiple surgeries for low back L4-5-S1, left knee arthroscopy, vertigo, orthostatic hypotension, vasovagal episode, multiple drug allergies and intolerances - who presents with severe left knee  and ankle pain pain worse with movement, left ankle swelling and right elbow pain sp fall. states slip and fall at wedding, wearing heels. Denies head injury or LOC. Has chronic low back pain which she is following with PT. Notes numbness sensation anterior shins b/l mild.      Problem/Plan - 1:  ·  Problem: severe Pain in left knee.   ·  Plan: keep left  leg in immobilizer   PT re-eval pending. I called and discussed with ortho service about MRI knee finding of Radial tear at the junction of the posterior horn and posterior root of the lateral meniscus with degeneration throughout the remaining   meniscus. conservative management per ortho and podiatrist. PT eval pending.      Problem/Plan - 2:  ·  Problem: Vertigo.   ·  Plan: supportive care and fall precautions. antivert. encouraged patient to participate in PT.         Problem/Plan - 3:  ·  Problem: Degenerative disc disease, lumbar.   ·  Plan: supportive care.     Problem/Plan - 4:  ·  Problem: Left ankle pain.   ·  Plan: Likely chronic sesmoiditis per podiatry. no acute intervention was recommended.  CAM boot received.      OA knee. PT and pain control.     Obesity class 2. diet and exercise.     Full code.   HSQ   Likely rehab.

## 2023-09-08 PROCEDURE — 99232 SBSQ HOSP IP/OBS MODERATE 35: CPT

## 2023-09-08 RX ORDER — ASPIRIN/CALCIUM CARB/MAGNESIUM 324 MG
1 TABLET ORAL
Qty: 0 | Refills: 0 | DISCHARGE
Start: 2023-09-08

## 2023-09-08 RX ORDER — PREGABALIN 225 MG/1
1 CAPSULE ORAL
Qty: 0 | Refills: 0 | DISCHARGE
Start: 2023-09-08

## 2023-09-08 RX ORDER — GABAPENTIN 400 MG/1
1 CAPSULE ORAL
Qty: 0 | Refills: 0 | DISCHARGE
Start: 2023-09-08

## 2023-09-08 RX ORDER — TRAMADOL HYDROCHLORIDE 50 MG/1
0.5 TABLET ORAL
Qty: 0 | Refills: 0 | DISCHARGE
Start: 2023-09-08

## 2023-09-08 RX ADMIN — Medication 81 MILLIGRAM(S): at 12:05

## 2023-09-08 RX ADMIN — TRAMADOL HYDROCHLORIDE 25 MILLIGRAM(S): 50 TABLET ORAL at 00:07

## 2023-09-08 RX ADMIN — GABAPENTIN 300 MILLIGRAM(S): 400 CAPSULE ORAL at 17:43

## 2023-09-08 RX ADMIN — HEPARIN SODIUM 5000 UNIT(S): 5000 INJECTION INTRAVENOUS; SUBCUTANEOUS at 17:46

## 2023-09-08 RX ADMIN — GABAPENTIN 300 MILLIGRAM(S): 400 CAPSULE ORAL at 06:22

## 2023-09-08 RX ADMIN — TRAMADOL HYDROCHLORIDE 25 MILLIGRAM(S): 50 TABLET ORAL at 06:34

## 2023-09-08 RX ADMIN — PREGABALIN 1000 MICROGRAM(S): 225 CAPSULE ORAL at 12:05

## 2023-09-08 RX ADMIN — HEPARIN SODIUM 5000 UNIT(S): 5000 INJECTION INTRAVENOUS; SUBCUTANEOUS at 06:23

## 2023-09-08 RX ADMIN — FAMOTIDINE 20 MILLIGRAM(S): 10 INJECTION INTRAVENOUS at 06:22

## 2023-09-08 RX ADMIN — FAMOTIDINE 20 MILLIGRAM(S): 10 INJECTION INTRAVENOUS at 17:43

## 2023-09-08 RX ADMIN — Medication 650 MILLIGRAM(S): at 19:04

## 2023-09-08 RX ADMIN — TRAMADOL HYDROCHLORIDE 25 MILLIGRAM(S): 50 TABLET ORAL at 01:05

## 2023-09-08 RX ADMIN — Medication 650 MILLIGRAM(S): at 18:31

## 2023-09-08 NOTE — PROGRESS NOTE ADULT - ASSESSMENT
A 54 yo F s/p fall, ankle pain, sprain, edema  - Patient seen and evaluated   - neurovascular status intact   - diffuse tenderness along the lateral ankle (lateral retromalleolar > medial), limited ankle DF 2/2 pain, diffuse tenderness to the forefoot, pain with ankle eversion>inversion, L foot ecchymosis limited to the dorsum forefoot, diffuse ankle edema, no open wounds, no signs of infection, R foot no open wounds, no signs of infection  - b/l ankle and foot Xrays: no fracture  - L foot MRI: No fracture, possible Chronic sesamoiditis  - Left ankle MRI: No acute fracture, Mild peroneus brevis tendinosis with a split tear at the level of the lateral malleolus, Minimal posterior tibialis tendinosis without tear, Minimal distal Achilles tendinosis without tear, prior ankle sprain.  - b/l venous duplex: no DVT  - WBAT cam boot  - No further podiatric intervention neccsary, stable for discharge from podiatry standpoint, please reconsult as needed   - Will require PT and transition from boot to brace in next 2 weeks  - Will follow, follow up outpatient on discharge

## 2023-09-08 NOTE — PROGRESS NOTE ADULT - ASSESSMENT
Completed and placed in [facility] file.  Electronically signed by Yumiko Ralph M.D.      53F with a PMHx of multiple orthopedic issues including multiple surgeries for low back L4-5-S1, left knee arthroscopy, vertigo, orthostatic hypotension, vasovagal episode, multiple drug allergies and intolerances - who presents with severe left knee  and ankle pain pain worse with movement, left ankle swelling and right elbow pain sp fall. states slip and fall at wedding, wearing heels. Denies head injury or LOC. Has chronic low back pain which she is following with PT. Notes numbness sensation anterior shins b/l mild.      Problem/Plan - 1:  ·  Problem: severe Pain in left knee.   ·  Plan: keep left  leg in immobilizer   PT re-eval pending. I called and discussed with ortho service about MRI knee finding of Radial tear at the junction of the posterior horn and posterior root of the lateral meniscus with degeneration throughout the remaining   meniscus. conservative management per ortho and podiatrist. PT eval pending.      Problem/Plan - 2:  ·  Problem: Vertigo.   ·  Plan: supportive care and fall precautions. no orthostasis pending. patient has hx of vertigo in the past.        Problem/Plan - 3:  ·  Problem: Degenerative disc disease, lumbar.   ·  Plan: supportive care.     Problem/Plan - 4:  ·  Problem: Left ankle pain.   ·  Plan: Likely chronic sesmoiditis per podiatry. no acute intervention was recommended.  CAM boot received.      OA knee. PT and pain control.     Obesity class 2. diet and exercise.     Full code.   HSQ   Likely rehab.

## 2023-09-09 PROCEDURE — 99232 SBSQ HOSP IP/OBS MODERATE 35: CPT

## 2023-09-09 RX ORDER — LACTOBACILLUS ACIDOPHILUS 100MM CELL
0 CAPSULE ORAL
Refills: 0 | DISCHARGE

## 2023-09-09 RX ORDER — LORATADINE 10 MG/1
10 TABLET ORAL EVERY 12 HOURS
Refills: 0 | Status: DISCONTINUED | OUTPATIENT
Start: 2023-09-09 | End: 2023-09-09

## 2023-09-09 RX ORDER — DICLOFENAC SODIUM 75 MG/1
1 TABLET, DELAYED RELEASE ORAL
Refills: 0 | DISCHARGE

## 2023-09-09 RX ORDER — CYCLOBENZAPRINE HYDROCHLORIDE 10 MG/1
1 TABLET, FILM COATED ORAL
Refills: 0 | DISCHARGE

## 2023-09-09 RX ORDER — FEXOFENADINE HCL 30 MG
1 TABLET ORAL
Refills: 0 | DISCHARGE

## 2023-09-09 RX ORDER — SIMETHICONE 80 MG/1
1 TABLET, CHEWABLE ORAL
Refills: 0 | DISCHARGE

## 2023-09-09 RX ORDER — LACTOBACILLUS ACIDOPHILUS 100MM CELL
1 CAPSULE ORAL DAILY
Refills: 0 | Status: DISCONTINUED | OUTPATIENT
Start: 2023-09-09 | End: 2023-09-11

## 2023-09-09 RX ORDER — SIMETHICONE 80 MG/1
80 TABLET, CHEWABLE ORAL
Refills: 0 | Status: DISCONTINUED | OUTPATIENT
Start: 2023-09-09 | End: 2023-09-11

## 2023-09-09 RX ORDER — FEXOFENADINE HCL 30 MG
180 TABLET ORAL DAILY
Refills: 0 | Status: DISCONTINUED | OUTPATIENT
Start: 2023-09-10 | End: 2023-09-11

## 2023-09-09 RX ORDER — CHOLECALCIFEROL (VITAMIN D3) 125 MCG
2000 CAPSULE ORAL DAILY
Refills: 0 | Status: DISCONTINUED | OUTPATIENT
Start: 2023-09-09 | End: 2023-09-11

## 2023-09-09 RX ORDER — DICLOFENAC SODIUM 30 MG/G
2 GEL TOPICAL
Refills: 0 | Status: DISCONTINUED | OUTPATIENT
Start: 2023-09-09 | End: 2023-09-11

## 2023-09-09 RX ORDER — DICLOFENAC SODIUM 75 MG/1
75 TABLET, DELAYED RELEASE ORAL
Refills: 0 | Status: DISCONTINUED | OUTPATIENT
Start: 2023-09-09 | End: 2023-09-11

## 2023-09-09 RX ADMIN — TRAMADOL HYDROCHLORIDE 25 MILLIGRAM(S): 50 TABLET ORAL at 12:13

## 2023-09-09 RX ADMIN — Medication 81 MILLIGRAM(S): at 12:13

## 2023-09-09 RX ADMIN — HEPARIN SODIUM 5000 UNIT(S): 5000 INJECTION INTRAVENOUS; SUBCUTANEOUS at 05:21

## 2023-09-09 RX ADMIN — PREGABALIN 1000 MICROGRAM(S): 225 CAPSULE ORAL at 12:13

## 2023-09-09 RX ADMIN — GABAPENTIN 300 MILLIGRAM(S): 400 CAPSULE ORAL at 05:20

## 2023-09-09 RX ADMIN — HEPARIN SODIUM 5000 UNIT(S): 5000 INJECTION INTRAVENOUS; SUBCUTANEOUS at 18:03

## 2023-09-09 RX ADMIN — TRAMADOL HYDROCHLORIDE 25 MILLIGRAM(S): 50 TABLET ORAL at 00:43

## 2023-09-09 RX ADMIN — FAMOTIDINE 20 MILLIGRAM(S): 10 INJECTION INTRAVENOUS at 05:20

## 2023-09-09 RX ADMIN — GABAPENTIN 300 MILLIGRAM(S): 400 CAPSULE ORAL at 18:03

## 2023-09-09 RX ADMIN — FAMOTIDINE 20 MILLIGRAM(S): 10 INJECTION INTRAVENOUS at 18:03

## 2023-09-09 NOTE — PROGRESS NOTE ADULT - ASSESSMENT
53F with a PMHx of multiple orthopedic issues including multiple surgeries for low back L4-5-S1, left knee arthroscopy, vertigo, orthostatic hypotension, vasovagal episode, multiple drug allergies and intolerances - who presents with severe left knee  and ankle pain pain worse with movement, left ankle swelling and right elbow pain sp fall. states slip and fall at wedding, wearing heels. Denies head injury or LOC. Has chronic low back pain which she is following with PT. Notes numbness sensation anterior shins b/l mild.      Problem/Plan - 1:  ·  Problem: severe Pain in left knee.   ·  Plan: keep left  leg in immobilizer   PT re-eval pending. I called and discussed with ortho service about MRI knee finding of Radial tear at the junction of the posterior horn and posterior root of the lateral meniscus with degeneration throughout the remaining   meniscus. conservative management per ortho and podiatrist.   PT recommended KB     Problem/Plan - 2:  ·  Problem: Vertigo.   ·  Plan: supportive care and fall precautions. antivert. encouraged patient to participate in PT.         Problem/Plan - 3:  ·  Problem: Degenerative disc disease, lumbar.   ·  Plan: supportive care.     Problem/Plan - 4:  ·  Problem: Left ankle pain.   ·  Plan: Likely chronic sesmoiditis per podiatry. no acute intervention was recommended.  CAM boot received.      OA knee. PT and pain control.     Obesity class 2. diet and exercise.     hx of allergies. cont pepcid. patient wants to take brand name meds that she takes at home. requested to have someone bring her home meds. requested to have follow up with allergist for outpatient iv infusion for her allergies.     Full code.   HSQ   Rehab pending placement.       53F with a PMHx of multiple orthopedic issues including multiple surgeries for low back L4-5-S1, left knee arthroscopy, vertigo, orthostatic hypotension, vasovagal episode, multiple drug allergies and intolerances - who presents with severe left knee  and ankle pain pain worse with movement, left ankle swelling and right elbow pain sp fall. states slip and fall at wedding, wearing heels. Denies head injury or LOC. Has chronic low back pain which she is following with PT. Notes numbness sensation anterior shins b/l mild.      Problem/Plan - 1:  ·  Problem: severe Pain in left knee.   ·  Plan: keep left  leg in immobilizer    I called and discussed with ortho service about MRI knee finding of Radial tear at the junction of the posterior horn and posterior root of the lateral meniscus with degeneration throughout the remaining   meniscus. conservative management per ortho and podiatrist.   PT recommended KB     Problem/Plan - 2:  ·  Problem: Vertigo.   ·  Plan: supportive care and fall precautions. antivert. encouraged patient to participate in PT.         Problem/Plan - 3:  ·  Problem: Degenerative disc disease, lumbar.   ·  Plan: supportive care.     Problem/Plan - 4:  ·  Problem: Left ankle pain.   ·  Plan: Likely chronic sesmoiditis per podiatry. no acute intervention was recommended.  CAM boot received.      OA knee. PT and pain control.     Obesity class 2. diet and exercise.     hx of allergies. cont pepcid. patient wants to take brand name meds that she takes at home. requested to have someone bring her home meds. requested to have follow up with allergist for outpatient iv infusion for her allergies.     Full code.   HSQ   Rehab pending placement.

## 2023-09-10 PROCEDURE — 99232 SBSQ HOSP IP/OBS MODERATE 35: CPT

## 2023-09-10 RX ORDER — CYCLOBENZAPRINE HYDROCHLORIDE 10 MG/1
1 TABLET, FILM COATED ORAL
Refills: 0 | DISCHARGE

## 2023-09-10 RX ORDER — SODIUM CHLORIDE 9 MG/ML
1000 INJECTION, SOLUTION INTRAVENOUS
Refills: 0 | Status: DISCONTINUED | OUTPATIENT
Start: 2023-09-10 | End: 2023-09-10

## 2023-09-10 RX ADMIN — FAMOTIDINE 20 MILLIGRAM(S): 10 INJECTION INTRAVENOUS at 06:41

## 2023-09-10 RX ADMIN — FAMOTIDINE 20 MILLIGRAM(S): 10 INJECTION INTRAVENOUS at 18:13

## 2023-09-10 RX ADMIN — SIMETHICONE 80 MILLIGRAM(S): 80 TABLET, CHEWABLE ORAL at 09:32

## 2023-09-10 RX ADMIN — Medication 650 MILLIGRAM(S): at 18:15

## 2023-09-10 RX ADMIN — TRAMADOL HYDROCHLORIDE 25 MILLIGRAM(S): 50 TABLET ORAL at 06:47

## 2023-09-10 RX ADMIN — DICLOFENAC SODIUM 2 GRAM(S): 30 GEL TOPICAL at 17:40

## 2023-09-10 RX ADMIN — Medication 1 TABLET(S): at 11:47

## 2023-09-10 RX ADMIN — Medication 180 MILLIGRAM(S): at 11:49

## 2023-09-10 RX ADMIN — TRAMADOL HYDROCHLORIDE 25 MILLIGRAM(S): 50 TABLET ORAL at 00:12

## 2023-09-10 RX ADMIN — HEPARIN SODIUM 5000 UNIT(S): 5000 INJECTION INTRAVENOUS; SUBCUTANEOUS at 06:39

## 2023-09-10 RX ADMIN — TRAMADOL HYDROCHLORIDE 25 MILLIGRAM(S): 50 TABLET ORAL at 19:00

## 2023-09-10 RX ADMIN — GABAPENTIN 300 MILLIGRAM(S): 400 CAPSULE ORAL at 17:24

## 2023-09-10 RX ADMIN — TRAMADOL HYDROCHLORIDE 25 MILLIGRAM(S): 50 TABLET ORAL at 18:14

## 2023-09-10 RX ADMIN — DICLOFENAC SODIUM 2 GRAM(S): 30 GEL TOPICAL at 11:46

## 2023-09-10 RX ADMIN — HEPARIN SODIUM 5000 UNIT(S): 5000 INJECTION INTRAVENOUS; SUBCUTANEOUS at 18:13

## 2023-09-10 RX ADMIN — DICLOFENAC SODIUM 2 GRAM(S): 30 GEL TOPICAL at 06:45

## 2023-09-10 RX ADMIN — Medication 81 MILLIGRAM(S): at 11:44

## 2023-09-10 RX ADMIN — GABAPENTIN 300 MILLIGRAM(S): 400 CAPSULE ORAL at 06:44

## 2023-09-10 RX ADMIN — TRAMADOL HYDROCHLORIDE 25 MILLIGRAM(S): 50 TABLET ORAL at 07:30

## 2023-09-10 RX ADMIN — DICLOFENAC SODIUM 2 GRAM(S): 30 GEL TOPICAL at 23:54

## 2023-09-10 RX ADMIN — Medication 2000 UNIT(S): at 09:35

## 2023-09-10 RX ADMIN — Medication 650 MILLIGRAM(S): at 17:26

## 2023-09-10 RX ADMIN — PREGABALIN 1000 MICROGRAM(S): 225 CAPSULE ORAL at 11:44

## 2023-09-10 RX ADMIN — DICLOFENAC SODIUM 2 GRAM(S): 30 GEL TOPICAL at 00:16

## 2023-09-10 RX ADMIN — SIMETHICONE 80 MILLIGRAM(S): 80 TABLET, CHEWABLE ORAL at 17:25

## 2023-09-10 RX ADMIN — TRAMADOL HYDROCHLORIDE 25 MILLIGRAM(S): 50 TABLET ORAL at 00:53

## 2023-09-10 NOTE — PROGRESS NOTE ADULT - ASSESSMENT
53F with a PMHx of multiple orthopedic issues including multiple surgeries for low back L4-5-S1, left knee arthroscopy, vertigo, orthostatic hypotension, vasovagal episode, multiple drug allergies and intolerances - who presents with severe left knee  and ankle pain pain worse with movement, left ankle swelling and right elbow pain sp fall. states slip and fall at wedding, wearing heels. Denies head injury or LOC. Has chronic low back pain which she is following with PT. Notes numbness sensation anterior shins b/l mild.      Problem/Plan - 1:  ·  Problem: severe Pain in left knee.   ·  Plan: keep left  leg in immobilizer  I called and discussed with ortho service about MRI knee finding of Radial tear at the junction of the posterior horn and posterior root of the lateral meniscus with degeneration throughout the remaining   meniscus. conservative management per ortho and podiatrist.   PT recommended KB     Problem/Plan - 2:  ·  Problem: Vertigo.   ·  Plan: supportive care and fall precautions. antivert. encouraged patient to participate in PT.         Problem/Plan - 3:  ·  Problem: Degenerative disc disease, lumbar.   ·  Plan: supportive care.     Problem/Plan - 4:  ·  Problem: Left ankle pain.   ·  Plan: Likely chronic sesmoiditis per podiatry. no acute intervention was recommended.  CAM boot received.      OA knee. PT and pain control.     Obesity class 2. diet and exercise.     hx of allergies. cont pepcid. Reviewed home meds for allergies. Patient can take her own home meds. Pharmacy has approved them already. updated nurse.     Full code.   HSQ   Rehab pending placement.

## 2023-09-11 ENCOUNTER — TRANSCRIPTION ENCOUNTER (OUTPATIENT)
Age: 53
End: 2023-09-11

## 2023-09-11 VITALS
RESPIRATION RATE: 18 BRPM | TEMPERATURE: 98 F | SYSTOLIC BLOOD PRESSURE: 114 MMHG | OXYGEN SATURATION: 100 % | DIASTOLIC BLOOD PRESSURE: 71 MMHG | HEART RATE: 87 BPM

## 2023-09-11 PROCEDURE — 99239 HOSP IP/OBS DSCHRG MGMT >30: CPT

## 2023-09-11 RX ORDER — CYCLOBENZAPRINE HYDROCHLORIDE 10 MG/1
10 TABLET, FILM COATED ORAL THREE TIMES A DAY
Refills: 0 | Status: DISCONTINUED | OUTPATIENT
Start: 2023-09-11 | End: 2023-09-11

## 2023-09-11 RX ORDER — CETIRIZINE HYDROCHLORIDE 10 MG/1
1 TABLET ORAL
Refills: 0 | DISCHARGE

## 2023-09-11 RX ORDER — CHOLECALCIFEROL (VITAMIN D3) 125 MCG
1 CAPSULE ORAL
Refills: 0 | DISCHARGE

## 2023-09-11 RX ADMIN — DICLOFENAC SODIUM 2 GRAM(S): 30 GEL TOPICAL at 13:55

## 2023-09-11 RX ADMIN — GABAPENTIN 300 MILLIGRAM(S): 400 CAPSULE ORAL at 06:27

## 2023-09-11 RX ADMIN — Medication 1 TABLET(S): at 06:27

## 2023-09-11 RX ADMIN — Medication 81 MILLIGRAM(S): at 11:43

## 2023-09-11 RX ADMIN — HEPARIN SODIUM 5000 UNIT(S): 5000 INJECTION INTRAVENOUS; SUBCUTANEOUS at 06:33

## 2023-09-11 RX ADMIN — FAMOTIDINE 20 MILLIGRAM(S): 10 INJECTION INTRAVENOUS at 18:23

## 2023-09-11 RX ADMIN — DICLOFENAC SODIUM 2 GRAM(S): 30 GEL TOPICAL at 18:25

## 2023-09-11 RX ADMIN — Medication 180 MILLIGRAM(S): at 06:34

## 2023-09-11 RX ADMIN — DICLOFENAC SODIUM 2 GRAM(S): 30 GEL TOPICAL at 06:27

## 2023-09-11 RX ADMIN — GABAPENTIN 300 MILLIGRAM(S): 400 CAPSULE ORAL at 18:23

## 2023-09-11 RX ADMIN — Medication 2000 UNIT(S): at 11:44

## 2023-09-11 RX ADMIN — PREGABALIN 1000 MICROGRAM(S): 225 CAPSULE ORAL at 11:43

## 2023-09-11 RX ADMIN — FAMOTIDINE 20 MILLIGRAM(S): 10 INJECTION INTRAVENOUS at 06:27

## 2023-09-11 RX ADMIN — SIMETHICONE 80 MILLIGRAM(S): 80 TABLET, CHEWABLE ORAL at 06:33

## 2023-09-11 NOTE — PROGRESS NOTE ADULT - PROVIDER SPECIALTY LIST ADULT
Hospitalist
Podiatry
Hospitalist
Provider Procedure Text (A): After obtaining clear surgical margins the defect was repaired by another provider.

## 2023-09-11 NOTE — DISCHARGE NOTE NURSING/CASE MANAGEMENT/SOCIAL WORK - NSDCFUADDAPPT_GEN_ALL_CORE_FT
Podiatry Discharge Instructions:  Follow up: Please follow up with Dr. Waterhouse within 1 week of discharge from the hospital, please call [phone] for appointment and discuss that you recently were seen in the hospital.  Wound Care: Please leave your dressing clean dry intact until your follow up appointment   Weight bearing: Please weight bear as tolerated in a CAM to left lower extremity.

## 2023-09-11 NOTE — PROGRESS NOTE ADULT - REASON FOR ADMISSION
s/p fall left knee and ankle pain

## 2023-09-11 NOTE — DISCHARGE NOTE NURSING/CASE MANAGEMENT/SOCIAL WORK - PATIENT PORTAL LINK FT
You can access the FollowMyHealth Patient Portal offered by [facility] by registering at the following website: [email or web address]. By joining [facility]’s FollowMyHealth portal, you will also be able to view your health information using other applications (apps) compatible with our system.

## 2023-09-11 NOTE — PROGRESS NOTE ADULT - ASSESSMENT
53F with a PMHx of multiple orthopedic issues including multiple surgeries for low back L4-5-S1, left knee arthroscopy, vertigo, orthostatic hypotension, vasovagal episode, multiple drug allergies and intolerances - who presents with severe left knee  and ankle pain pain worse with movement, left ankle swelling and right elbow pain sp fall. states slip and fall at wedding, wearing heels. Denies head injury or LOC. Has chronic low back pain which she is following with PT. Notes numbness sensation anterior shins b/l mild.      Problem/Plan - 1:  ·  Problem: severe Pain in left knee.   ·  Plan: keep left  leg in immobilizer  I called and discussed with ortho service about MRI knee finding of Radial tear at the junction of the posterior horn and posterior root of the lateral meniscus with degeneration throughout the remaining   meniscus. conservative management per ortho and podiatrist.   PT recommended KB     Problem/Plan - 2:  ·  Problem: Vertigo.   ·  Plan: supportive care and fall precautions. antivert. encouraged patient to participate in PT.         Problem/Plan - 3:  ·  Problem: Degenerative disc disease, lumbar.   ·  Plan: supportive care.     Problem/Plan - 4:  ·  Problem: Left ankle pain.   ·  Plan: Likely chronic sesmoiditis per podiatry. no acute intervention was recommended.  CAM boot received.      OA knee. PT and pain control.     Obesity class 2. diet and exercise.     hx of allergies. cont pepcid. and other patient's own home meds.     Full code.   HSQ   Medically ready for discharge pending rehab placement.

## 2023-09-11 NOTE — PROGRESS NOTE ADULT - SUBJECTIVE AND OBJECTIVE BOX
CHIEF COMPLAINT: FU of left ankle and knee pain. dizziness better  no chest pain   no n/v/d/abd pain/sob.       PHYSICAL EXAM:    GENERAL: Moderately built, no acute distress   CHEST/LUNG:  No wheezing, no crackles   HEART: Regular rate and rhythm; No murmurs  ABDOMEN: Soft, Nontender, Nondistended; Bowel sounds present  EXTREMITIES:  No clubbing, cyanosis. tenderness along the lateral ankle area. + ankle edema left side.   NERVOUS SYSTEM:  Grossly non focal.  Psychiatry: AAO x 3, mood is appropriate       OBJECTIVE DATA:     vital signs reviewed and stable. no orthostasis present.     MEDICATIONS  (STANDING):  aspirin enteric coated 81 milliGRAM(s) Oral daily  cyanocobalamin 1000 MICROGram(s) Oral daily  famotidine    Tablet 20 milliGRAM(s) Oral two times a day  gabapentin 300 milliGRAM(s) Oral two times a day  heparin   Injectable 5000 Unit(s) SubCutaneous every 12 hours    MEDICATIONS  (PRN):  acetaminophen     Tablet .. 650 milliGRAM(s) Oral every 6 hours PRN Temp greater or equal to 38C (100.4F), Mild Pain (1 - 3)  traMADol 25 milliGRAM(s) Oral every 6 hours PRN Moderate Pain (4 - 6)      
CHIEF COMPLAINT: left knee and ankle pain controlled with current meds.   No fever  no diarrhea  no chest pain or sob   no hives or rash    PHYSICAL EXAM:    GENERAL: Moderately built, no acute distress   CHEST/LUNG:  No wheezing, no crackles   HEART: Regular rate and rhythm; No murmurs  ABDOMEN: Soft, Nontender, Nondistended; Bowel sounds present  EXTREMITIES:  No clubbing, cyanosis. tenderness along the lateral ankle area improving.   Psychiatry: AAO x 3, mood is appropriate       OBJECTIVE DATA:     Vital Signs Last 24 Hrs  T(C): 36.5 (11 Sep 2023 06:27), Max: 37.1 (11 Sep 2023 00:08)  T(F): 97.7 (11 Sep 2023 06:27), Max: 98.8 (11 Sep 2023 00:08)  HR: 83 (11 Sep 2023 06:27) (78 - 97)  BP: 108/76 (11 Sep 2023 06:27) (98/52 - 138/80)  BP(mean): --  RR: 18 (11 Sep 2023 06:27) (18 - 18)  SpO2: 98% (11 Sep 2023 06:27) (97% - 98%)    Parameters below as of 11 Sep 2023 06:27  Patient On (Oxygen Delivery Method): room air       MEDICATIONS  (STANDING):  aspirin enteric coated 81 milliGRAM(s) Oral daily  cholecalciferol 2000 Unit(s) Oral daily  cyanocobalamin 1000 MICROGram(s) Oral daily  diclofenac sodium 1% Gel 2 Gram(s) Topical four times a day  famotidine    Tablet 20 milliGRAM(s) Oral two times a day  fexofenadine Tablet 180 milliGRAM(s) Oral daily  gabapentin 300 milliGRAM(s) Oral two times a day  heparin   Injectable 5000 Unit(s) SubCutaneous every 12 hours  lactobacillus acidophilus 1 Tablet(s) Oral daily    MEDICATIONS  (PRN):  acetaminophen     Tablet .. 650 milliGRAM(s) Oral every 6 hours PRN Temp greater or equal to 38C (100.4F), Mild Pain (1 - 3)  cyclobenzaprine 10 milliGRAM(s) Oral three times a day PRN Muscle Spasm  diclofenac 75 milliGRAM(s) Oral two times a day PRN Moderate Pain (4 - 6)  simethicone 80 milliGRAM(s) Chew four times a day PRN Indigestion  traMADol 25 milliGRAM(s) Oral every 6 hours PRN Moderate Pain (4 - 6)      
CHIEF COMPLAINT: FU of left ankle and knee pain. did not participate in PT because of dizziness yesterday. orthostasis negative yesterday. no new focal deficit.   no chest pain   no n/v/d/abd pain/sob.       PHYSICAL EXAM:    GENERAL: Moderately built, no acute distress   CHEST/LUNG:  No wheezing, no crackles   HEART: Regular rate and rhythm; No murmurs  ABDOMEN: Soft, Nontender, Nondistended; Bowel sounds present  EXTREMITIES:  No clubbing, cyanosis. tenderness along the lateral ankle area. + ankle edema left side.   NERVOUS SYSTEM:  Grossly non focal.  Psychiatry: AAO x 3, mood is appropriate       OBJECTIVE DATA:     Vital Signs Last 24 Hrs  T(C): 36.7 (07 Sep 2023 11:13), Max: 37.1 (06 Sep 2023 12:30)  T(F): 98 (07 Sep 2023 11:13), Max: 98.7 (06 Sep 2023 12:30)  HR: 78 (07 Sep 2023 11:13) (68 - 84)  BP: 118/74 (07 Sep 2023 11:13) (104/66 - 118/74)  BP(mean): --  RR: 18 (07 Sep 2023 11:13) (18 - 19)  SpO2: 98% (07 Sep 2023 11:13) (98% - 99%)    Parameters below as of 07 Sep 2023 11:13  Patient On (Oxygen Delivery Method): room air    MEDICATIONS  (STANDING):  aspirin enteric coated 81 milliGRAM(s) Oral daily  cyanocobalamin 1000 MICROGram(s) Oral daily  famotidine    Tablet 20 milliGRAM(s) Oral two times a day  gabapentin 300 milliGRAM(s) Oral two times a day  heparin   Injectable 5000 Unit(s) SubCutaneous every 12 hours    MEDICATIONS  (PRN):  acetaminophen     Tablet .. 650 milliGRAM(s) Oral every 6 hours PRN Temp greater or equal to 38C (100.4F), Mild Pain (1 - 3)  traMADol 25 milliGRAM(s) Oral every 6 hours PRN Moderate Pain (4 - 6)      
Patient is a 53y old  Female who presents with a chief complaint of s/p fall left knee and ankle pain (07 Sep 2023 12:11)       INTERVAL HPI/OVERNIGHT EVENTS:  Patient seen and evaluated at bedside.  Pt is resting comfortable in NAD. Denies N/V/F/C.  Pain rated at X/10    Allergies    Flexeril (Unknown)  Avelox (Unknown)  oxycodone (Unknown)  Venofer (Unknown)  cetirizine (Unknown)  fentanyl (Swelling)  Dramamine (Unknown)    Intolerances        Vital Signs Last 24 Hrs  T(C): 36.6 (08 Sep 2023 05:22), Max: 37.2 (08 Sep 2023 00:16)  T(F): 97.8 (08 Sep 2023 05:22), Max: 98.9 (08 Sep 2023 00:16)  HR: 93 (08 Sep 2023 05:22) (73 - 93)  BP: 120/77 (08 Sep 2023 05:22) (100/49 - 120/77)  BP(mean): --  RR: 19 (08 Sep 2023 05:22) (18 - 19)  SpO2: 100% (08 Sep 2023 05:22) (97% - 100%)    Parameters below as of 08 Sep 2023 05:22  Patient On (Oxygen Delivery Method): room air        LABS:              CAPILLARY BLOOD GLUCOSE          Lower Extremity Physical Exam:  Vascular: DP/PT 2/4, B/L, CFT <3seconds B/L, Temperature gradient warm to cool, B/L.   Neuro: Epicritic sensation intact to the level of digits, B/L.  Musculoskeletal/Ortho: diffuse tenderness along the lateral ankle (lateral retromalleolar > medial), limited ankle DF 2/2 pain, diffuse tenderness to the forefoot, pain with ankle eversion>inversion  Skin: L foot ecchymosis limited to the dorsum forefoot, diffuse ankle edema, no open wounds, no signs of infection, R foot no open wounds, no signs of infection    RADIOLOGY & ADDITIONAL TESTS:  
CHIEF COMPLAINT: left knee and ankle pain better.   patient's boyfriend brought her home meds for allergies.   No fever  no diarrhea  no chest pain or sob   no hives or rash    PHYSICAL EXAM:    GENERAL: Moderately built, no acute distress   CHEST/LUNG:  No wheezing, no crackles   HEART: Regular rate and rhythm; No murmurs  ABDOMEN: Soft, Nontender, Nondistended; Bowel sounds present  EXTREMITIES:  No clubbing, cyanosis. tenderness along the lateral ankle area.   Psychiatry: AAO x 3, mood is appropriate       OBJECTIVE DATA:     Vital Signs Last 24 Hrs  T(C): 36.9 (10 Sep 2023 06:00), Max: 36.9 (10 Sep 2023 00:28)  T(F): 98.4 (10 Sep 2023 06:00), Max: 98.5 (10 Sep 2023 00:28)  HR: 85 (10 Sep 2023 06:00) (71 - 88)  BP: 101/61 (10 Sep 2023 06:00) (101/61 - 136/85)  BP(mean): --  RR: 18 (10 Sep 2023 06:00) (18 - 18)  SpO2: 95% (10 Sep 2023 06:00) (95% - 99%)    Parameters below as of 10 Sep 2023 06:00  Patient On (Oxygen Delivery Method): room air      MEDICATIONS  (STANDING):  aspirin enteric coated 81 milliGRAM(s) Oral daily  cholecalciferol 2000 Unit(s) Oral daily  cyanocobalamin 1000 MICROGram(s) Oral daily  dextrose 5% + sodium chloride 0.9%. 1000 milliLiter(s) (80 mL/Hr) IV Continuous <Continuous>  diclofenac sodium 1% Gel 2 Gram(s) Topical four times a day  famotidine    Tablet 20 milliGRAM(s) Oral two times a day  fexofenadine Tablet 180 milliGRAM(s) Oral daily  gabapentin 300 milliGRAM(s) Oral two times a day  heparin   Injectable 5000 Unit(s) SubCutaneous every 12 hours  lactobacillus acidophilus 1 Tablet(s) Oral daily    MEDICATIONS  (PRN):  acetaminophen     Tablet .. 650 milliGRAM(s) Oral every 6 hours PRN Temp greater or equal to 38C (100.4F), Mild Pain (1 - 3)  diclofenac 75 milliGRAM(s) Oral two times a day PRN Moderate Pain (4 - 6)  simethicone 80 milliGRAM(s) Chew four times a day PRN Indigestion  traMADol 25 milliGRAM(s) Oral every 6 hours PRN Moderate Pain (4 - 6)      
CHIEF COMPLAINT: left knee and ankle pain slightly better.   wants to go to rehab but rehabilitation facilities in [facility] have denied the placement.   no fever  no n/v/d/cp/sob       PHYSICAL EXAM:    GENERAL: Moderately built, no acute distress   CHEST/LUNG:  No wheezing, no crackles   HEART: Regular rate and rhythm; No murmurs  ABDOMEN: Soft, Nontender, Nondistended; Bowel sounds present  EXTREMITIES:  No clubbing, cyanosis. tenderness along the lateral ankle area. + ankle edema left side.   Psychiatry: AAO x 3, mood is appropriate       OBJECTIVE DATA:     Vital Signs Last 24 Hrs  T(C): 36.4 (09 Sep 2023 05:09), Max: 36.9 (08 Sep 2023 12:25)  T(F): 97.6 (09 Sep 2023 05:09), Max: 98.5 (08 Sep 2023 12:25)  HR: 78 (09 Sep 2023 05:09) (69 - 89)  BP: 126/74 (09 Sep 2023 05:09) (106/71 - 126/74)  BP(mean): --  RR: 19 (09 Sep 2023 05:09) (16 - 19)  SpO2: 98% (09 Sep 2023 05:09) (95% - 98%)    Parameters below as of 09 Sep 2023 05:09  Patient On (Oxygen Delivery Method): room air  Daily Weight in k.9 (09 Sep 2023 05:09)  MEDICATIONS  (STANDING):  aspirin enteric coated 81 milliGRAM(s) Oral daily  cyanocobalamin 1000 MICROGram(s) Oral daily  famotidine    Tablet 20 milliGRAM(s) Oral two times a day  gabapentin 300 milliGRAM(s) Oral two times a day  heparin   Injectable 5000 Unit(s) SubCutaneous every 12 hours    MEDICATIONS  (PRN):  acetaminophen     Tablet .. 650 milliGRAM(s) Oral every 6 hours PRN Temp greater or equal to 38C (100.4F), Mild Pain (1 - 3)  traMADol 25 milliGRAM(s) Oral every 6 hours PRN Moderate Pain (4 - 6)      
CHIEF COMPLAINT: FU of left ankle and knee pain. tramadol helps   worse with movements. wants to go to rehab  no chest pain   no n/v/d/abd pain/sob.       PHYSICAL EXAM:    GENERAL: Moderately built, no acute distress   CHEST/LUNG:  No wheezing, no crackles   HEART: Regular rate and rhythm; No murmurs  ABDOMEN: Soft, Nontender, Nondistended; Bowel sounds present  EXTREMITIES:  No clubbing, cyanosis. tenderness along the lateral ankle area. + ankle edema left side.   NERVOUS SYSTEM:  Grossly non focal.  Psychiatry: AAO x 3, mood is appropriate       OBJECTIVE DATA:   Vital Signs Last 24 Hrs  T(C): 37.1 (06 Sep 2023 12:30), Max: 37.1 (05 Sep 2023 17:19)  T(F): 98.7 (06 Sep 2023 12:30), Max: 98.8 (05 Sep 2023 17:19)  HR: 70 (06 Sep 2023 12:30) (65 - 73)  BP: 111/66 (06 Sep 2023 12:30) (111/66 - 130/74)  BP(mean): --  RR: 19 (06 Sep 2023 12:30) (17 - 19)  SpO2: 99% (06 Sep 2023 12:30) (98% - 99%)    Parameters below as of 06 Sep 2023 04:50  Patient On (Oxygen Delivery Method): room air               Daily     Daily   LABS:                        11.5   5.63  )-----------( 250      ( 05 Sep 2023 06:10 )             35.3             09-05    140  |  107  |  10  ----------------------------<  92  3.8   |  30  |  0.70    Ca    8.7      05 Sep 2023 06:10  Phos  2.9     09-05  Mg     2.1     09-05                  Urinalysis Basic - ( 05 Sep 2023 06:10 )    Color: x / Appearance: x / SG: x / pH: x  Gluc: 92 mg/dL / Ketone: x  / Bili: x / Urobili: x   Blood: x / Protein: x / Nitrite: x   Leuk Esterase: x / RBC: x / WBC x   Sq Epi: x / Non Sq Epi: x / Bacteria: x         Interval Radiology studies: reviewed by me    < from: MR Tib/Fib No Cont, Left (09.05.23 @ 12:16) >  IMPRESSION:  No acute fracture.    < end of copied text >  < from: MR Knee No Cont, Left (09.05.23 @ 11:47) >  IMPRESSION:  1.  No acute displaced tear.  2.  Tricompartmental osteoarthritis which is moderate at the lateral   component.  3.  Radial tear at the junction of the posterior horn and posterior root   of the lateral meniscus with degeneration throughout the remaining   meniscus.  4.  Inner margin fraying of the medial meniscus. No extrusion.    < end of copied text >    < from: MR Ankle No Cont, Left (09.05.23 @ 11:47) >  IMPRESSION:  1.  No acute fracture.  2.  Mild peroneus brevis tendinosis with a split tear at the level of the   lateral malleolus.  3.  Minimal posterior tibialis tendinosis without tear.  4.  Minimal distal Achilles tendinosis without tear.  5.  MR findings prior ankle sprain.    < end of copied text >    MEDICATIONS  (STANDING):  aspirin enteric coated 81 milliGRAM(s) Oral daily  cyanocobalamin 1000 MICROGram(s) Oral daily  famotidine    Tablet 20 milliGRAM(s) Oral two times a day  gabapentin 300 milliGRAM(s) Oral two times a day  heparin   Injectable 5000 Unit(s) SubCutaneous every 12 hours    MEDICATIONS  (PRN):  acetaminophen     Tablet .. 650 milliGRAM(s) Oral every 6 hours PRN Temp greater or equal to 38C (100.4F), Mild Pain (1 - 3)  traMADol 25 milliGRAM(s) Oral every 6 hours PRN Moderate Pain (4 - 6)      
Patient is a 53y old  Female who presents with a chief complaint of s/p fall left knee and ankle pain (04 Sep 2023 14:38)      OVERNIGHT EVENTS:      REVIEW OF SYSTEMS: denies chest pain/SOB, diaphoresis, no F/C, cough, dizziness, headache, blurry vision, nausea, vomiting, abdominal pain. Rest unremarkable     MEDICATIONS  (STANDING):  aspirin enteric coated 81 milliGRAM(s) Oral daily  cyanocobalamin 1000 MICROGram(s) Oral daily  famotidine    Tablet 20 milliGRAM(s) Oral two times a day  gabapentin 300 milliGRAM(s) Oral two times a day  heparin   Injectable 5000 Unit(s) SubCutaneous every 12 hours    MEDICATIONS  (PRN):  acetaminophen     Tablet .. 650 milliGRAM(s) Oral every 6 hours PRN Temp greater or equal to 38C (100.4F), Mild Pain (1 - 3)  traMADol 25 milliGRAM(s) Oral every 6 hours PRN Moderate Pain (4 - 6)      Allergies    Flexeril (Unknown)  Avelox (Unknown)  oxycodone (Unknown)  Venofer (Unknown)  cetirizine (Unknown)  fentanyl (Swelling)  Dramamine (Unknown)    Intolerances        SUBJECTIVE: in bed in NAD, no acute events overnight     T(F): 97.6 (09-05-23 @ 05:24), Max: 98.6 (09-04-23 @ 22:47)  HR: 88 (09-05-23 @ 05:24) (60 - 88)  BP: 117/70 (09-05-23 @ 05:24) (114/74 - 131/73)  RR: 18 (09-05-23 @ 05:24) (18 - 18)  SpO2: 100% (09-05-23 @ 05:24) (99% - 100%)  Wt(kg): --      GENERAL: NAD, well-groomed, well-developed  HEAD:  Atraumatic, Normocephalic  EYES: EOMI, PERRLA, conjunctiva and sclera clear  ENMT: No tonsillar erythema, exudates, or enlargement; Moist mucous membranes, Good dentition, No lesions  NECK: Supple,  NERVOUS SYSTEM:  Alert & Oriented X3, Good concentration; Motor Strength 5/5 B/L upper and right lower  extremities;  on left lower extremity  limited movement due to pain.  mildly diminished sensation left leg   left lower extremity in immobilizer  CHEST/LUNG: Clear to percussion bilaterally; No rales, rhonchi, wheezing, or rubs  HEART: Regular rate and rhythm; No murmurs, rubs, or gallops  ABDOMEN: Soft, Nontender, Nondistended; Bowel sounds present  EXTREMITIES:  2+ Peripheral Pulses, No clubbing, cyanosis, + edema b/l   SKIN: varicose veins right worse than left      LABS:                        11.5   5.63  )-----------( 250      ( 05 Sep 2023 06:10 )             35.3     09-05    140  |  107  |  10  ----------------------------<  92  3.8   |  30  |  0.70    Ca    8.7      05 Sep 2023 06:10  Phos  2.9     09-05  Mg     2.1     09-05    TPro  7.4  /  Alb  3.2<L>  /  TBili  0.2  /  DBili  x   /  AST  18  /  ALT  16  /  AlkPhos  82  09-04      Urinalysis Basic - ( 05 Sep 2023 06:10 )    Color: x / Appearance: x / SG: x / pH: x  Gluc: 92 mg/dL / Ketone: x  / Bili: x / Urobili: x   Blood: x / Protein: x / Nitrite: x   Leuk Esterase: x / RBC: x / WBC x   Sq Epi: x / Non Sq Epi: x / Bacteria: x      Cultures;   CAPILLARY BLOOD GLUCOSE        Lipid panel:           RADIOLOGY & ADDITIONAL TESTS:    < from: MR Foot No Cont, Left (09.05.23 @ 10:38) >  IMPRESSION:  1.  Patchy edema/cystic changewithin bipartite medial hallux sesamoid   with degenerative signal within the plantar plate, without surrounding   soft tissue edema to suggest an acute process. Findings are likely   chronic/degenerative.    2.  No acute fracture or dislocation.    3.  Mild/moderate arthrosis at the third tarsometatarsal joint.      < end of copied text >    Imaging Personally Reviewed:  [ x] YES      Consultant(s) Notes Reviewed:  [x ] YES   < from: US Duplex Venous Lower Ext Complete, Bilateral (09.05.23 @ 09:33) >    IMPRESSION:  No evidence of deep venous thrombosis in either lower extremity.    < end of copied text >    Care Discussed with [x ] Consultants [X ] Patient [x ] Family  [x ]    [x ]  Other; RN

## 2023-09-11 NOTE — CHART NOTE - NSCHARTNOTEFT_GEN_A_CORE
Patient seen for length of stay nutrition risk rescreening. Patient has been screened for and presents negative for    -Pressure ulcers stage 2 or greater  -Enteral or Parenteral Nutrition  -BMI <18  -[id number] >8  -Chewing/Swallowing Difficulty  -Decreased appetite  -Unintentional Weight Loss  -Inadequate diet (i.e. NPO/Clear Liquids)    Pt with PMH of degenerative disc disease, multiple orthopedic issues including multiple surgeries for lower back, left knee arthroscopy, vertigo, orthostatic hypotension, vasovagal episode, & obesity. Pt presented with severe left knee pain s/p slip and fall. Ortho/Podiatry following.  Pt with good appetite and good PO intake PTA; follows mostly regular diet PTA, does not eat pork or shellfish for Denominational reasons; kitchen made aware. Continues with good appetite; consuming % of documented meals as per flow sheet. Denies any chew/swallowing difficulty or any N/V/D/C. Pt states wt typically fluctuates, however no recent wt loss noted.    No intervention required at this time. RD remains available.
Pt seen at bedside to fit with camboot to be used on left foot OOB following fall  Aircast and knee immobilizer removed and camboot fitted and adjusted.  Pt instructed to don and doff  Written instructions provided  Knee immobilizer  and aircast replaced   Follow up if needed      Omkar Haro [employer]  [phone]
Pt underwent MR L knee demonstrating     IMPRESSION:  1.  No acute displaced tear.  2.  Tricompartmental osteoarthritis which is moderate at the lateral   component.  3.  Radial tear at the junction of the posterior horn and posterior root   of the lateral meniscus with degeneration throughout the remaining   meniscus.  4.  Inner margin fraying of the medial meniscus. No extrusion.    No acute orthopaedic surgical intervention indicated at this time. This patient is orthopaedically stable for discharge.   Patient to follow up with Dr. Hu as an outpatient for further evaluation and management.   All of the patient's questions and concerns were answered and addressed.
MRI Foot/ankle/Knee reviewed. MRIs are consistent with knee osteoarthritis, diffuse tendinosis about the ankle joint and degenerative changes in the foot. No occult fractures noted. Patient can be weight bearing as tolerated on this side. Pain control w/NSAIDs as needed. Patient can follow up with Dr. Hu in the office in 3-5 days following discharge.    Fili Khoury, DO   PGY-5 Orthopaedic Surgery    Case to be d/w Dr. Hu

## 2023-09-15 DIAGNOSIS — M76.72 PERONEAL TENDINITIS, LEFT LEG: ICD-10-CM
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
## 2023-09-15 DIAGNOSIS — R42 DIZZINESS AND GIDDINESS: ICD-10-CM

## 2023-09-15 DIAGNOSIS — S83.282A OTHER TEAR OF LATERAL MENISCUS, CURRENT INJURY, LEFT KNEE, INITIAL ENCOUNTER: ICD-10-CM

## 2023-09-15 DIAGNOSIS — M17.12 UNILATERAL PRIMARY OSTEOARTHRITIS, LEFT KNEE: ICD-10-CM

## 2023-09-15 DIAGNOSIS — M47.816 SPONDYLOSIS WITHOUT MYELOPATHY OR RADICULOPATHY, LUMBAR REGION: ICD-10-CM

## 2023-09-15 DIAGNOSIS — S86.112A STRAIN OF OTHER MUSCLE(S) AND TENDON(S) OF POSTERIOR MUSCLE GROUP AT LOWER LEG LEVEL, LEFT LEG, INITIAL ENCOUNTER: ICD-10-CM
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
## 2023-09-15 DIAGNOSIS — E66.9 OBESITY, UNSPECIFIED: ICD-10-CM

## 2023-09-18 ENCOUNTER — APPOINTMENT (OUTPATIENT)
Dept: INTERNAL MEDICINE | Facility: CLINIC | Age: 53
End: 2023-09-18
Payer: MEDICARE

## 2023-09-18 VITALS
BODY MASS INDEX: 35.25 KG/M2 | HEART RATE: 77 BPM | DIASTOLIC BLOOD PRESSURE: 73 MMHG | SYSTOLIC BLOOD PRESSURE: 112 MMHG | TEMPERATURE: 97.2 F | OXYGEN SATURATION: 97 % | WEIGHT: 238 LBS | HEIGHT: 69 IN

## 2023-09-18 DIAGNOSIS — M25.562 PAIN IN LEFT KNEE: ICD-10-CM

## 2023-09-18 DIAGNOSIS — E78.5 HYPERLIPIDEMIA, UNSPECIFIED: ICD-10-CM

## 2023-09-18 DIAGNOSIS — L50.9 URTICARIA, UNSPECIFIED: ICD-10-CM

## 2023-09-18 PROCEDURE — 99496 TRANSJ CARE MGMT HIGH F2F 7D: CPT

## 2023-09-18 RX ORDER — DOCUSATE SODIUM 100 MG
100 TABLET ORAL DAILY
Qty: 90 | Refills: 0 | Status: DISCONTINUED | COMMUNITY
Start: 2018-07-26 | End: 2023-09-18

## 2023-09-18 RX ORDER — MUPIROCIN 20 MG/G
2 OINTMENT TOPICAL
Refills: 0 | Status: DISCONTINUED | COMMUNITY
Start: 2019-01-15 | End: 2023-09-18

## 2023-09-18 RX ORDER — LACTOBACILLUS ACIDOPHILUS 20B CELL
CAPSULE ORAL
Qty: 90 | Refills: 1 | Status: DISCONTINUED | COMMUNITY
Start: 2018-07-26 | End: 2023-09-18

## 2023-09-18 RX ORDER — CETIRIZINE HYDROCHLORIDE 10 MG/1
10 TABLET, FILM COATED ORAL
Refills: 0 | Status: ACTIVE | COMMUNITY
Start: 2022-11-11

## 2023-09-18 RX ORDER — FEXOFENADINE HYDROCHLORIDE 180 MG/1
180 TABLET ORAL
Refills: 0 | Status: ACTIVE | COMMUNITY
Start: 2023-09-18

## 2023-09-18 RX ORDER — HYDROCORTISONE 1 %
1 CREAM (GRAM) TOPICAL
Qty: 1 | Refills: 0 | Status: ACTIVE | COMMUNITY
Start: 2023-09-18 | End: 1900-01-01

## 2023-09-18 RX ORDER — FLUTICASONE PROPIONATE 50 UG/1
50 SPRAY, METERED NASAL DAILY
Qty: 1 | Refills: 0 | Status: DISCONTINUED | COMMUNITY
Start: 2018-09-26 | End: 2023-09-18

## 2023-09-18 RX ORDER — IBUPROFEN 200 MG/1
200 TABLET, COATED ORAL EVERY 8 HOURS
Qty: 180 | Refills: 0 | Status: DISCONTINUED | COMMUNITY
End: 2023-09-18

## 2023-09-18 RX ORDER — CYCLOSPORINE 0.5 MG/ML
0.05 EMULSION OPHTHALMIC
Refills: 0 | Status: DISCONTINUED | COMMUNITY
End: 2023-09-18

## 2023-09-18 RX ORDER — SUMATRIPTAN 25 MG/1
25 TABLET, FILM COATED ORAL
Refills: 0 | Status: DISCONTINUED | COMMUNITY
Start: 2022-09-30 | End: 2023-09-18

## 2023-09-20 ENCOUNTER — APPOINTMENT (OUTPATIENT)
Dept: ORTHOPEDIC SURGERY | Facility: CLINIC | Age: 53
End: 2023-09-20
Payer: MEDICARE

## 2023-09-20 VITALS
HEART RATE: 87 BPM | HEIGHT: 69 IN | SYSTOLIC BLOOD PRESSURE: 113 MMHG | TEMPERATURE: 97.6 F | OXYGEN SATURATION: 98 % | DIASTOLIC BLOOD PRESSURE: 74 MMHG

## 2023-09-20 DIAGNOSIS — M51.36 OTHER INTERVERTEBRAL DISC DEGENERATION, LUMBAR REGION: ICD-10-CM

## 2023-09-20 DIAGNOSIS — Z91.81 HISTORY OF FALLING: ICD-10-CM

## 2023-09-20 PROCEDURE — 99213 OFFICE O/P EST LOW 20 MIN: CPT

## 2023-09-20 PROCEDURE — 72084 X-RAY EXAM ENTIRE SPI 6/> VW: CPT | Mod: TC

## 2023-09-20 RX ORDER — ACETAMINOPHEN 325 MG/1
325 TABLET ORAL EVERY 6 HOURS
Qty: 30 | Refills: 0 | Status: COMPLETED | COMMUNITY
End: 2023-09-20

## 2023-09-20 RX ORDER — BUDESONIDE AND FORMOTEROL FUMARATE DIHYDRATE 80; 4.5 UG/1; UG/1
80-4.5 AEROSOL RESPIRATORY (INHALATION)
Refills: 0 | Status: COMPLETED | COMMUNITY
Start: 2023-05-25 | End: 2023-09-20

## 2023-09-21 PROBLEM — Z91.81 STATUS POST FALL: Status: ACTIVE | Noted: 2023-09-21

## 2023-09-21 PROBLEM — M51.36 DISC DEGENERATION, LUMBAR: Status: ACTIVE | Noted: 2023-09-21

## 2023-09-25 ENCOUNTER — APPOINTMENT (OUTPATIENT)
Dept: MRI IMAGING | Facility: CLINIC | Age: 53
End: 2023-09-25
Payer: MEDICARE

## 2023-09-25 ENCOUNTER — OUTPATIENT (OUTPATIENT)
Dept: OUTPATIENT SERVICES | Facility: HOSPITAL | Age: 53
LOS: 1 days | End: 2023-09-25

## 2023-09-25 DIAGNOSIS — Z98.89 OTHER SPECIFIED POSTPROCEDURAL STATES: Chronic | ICD-10-CM

## 2023-09-25 PROCEDURE — 73721 MRI JNT OF LWR EXTRE W/O DYE: CPT | Mod: 26,RT,MH

## 2023-09-25 PROCEDURE — 73718 MRI LOWER EXTREMITY W/O DYE: CPT | Mod: 26,RT,MH

## 2023-09-28 ENCOUNTER — APPOINTMENT (OUTPATIENT)
Dept: ORTHOPEDIC SURGERY | Facility: CLINIC | Age: 53
End: 2023-09-28
Payer: MEDICARE

## 2023-09-28 VITALS
HEIGHT: 69 IN | WEIGHT: 240 LBS | HEART RATE: 92 BPM | BODY MASS INDEX: 35.55 KG/M2 | DIASTOLIC BLOOD PRESSURE: 82 MMHG | OXYGEN SATURATION: 99 % | SYSTOLIC BLOOD PRESSURE: 143 MMHG

## 2023-09-28 DIAGNOSIS — G89.29 LOW BACK PAIN, UNSPECIFIED: ICD-10-CM

## 2023-09-28 DIAGNOSIS — M17.12 UNILATERAL PRIMARY OSTEOARTHRITIS, LEFT KNEE: ICD-10-CM

## 2023-09-28 DIAGNOSIS — M54.50 LOW BACK PAIN, UNSPECIFIED: ICD-10-CM

## 2023-09-28 DIAGNOSIS — M25.579 PAIN IN UNSPECIFIED ANKLE AND JOINTS OF UNSPECIFIED FOOT: ICD-10-CM

## 2023-09-28 PROCEDURE — 73564 X-RAY EXAM KNEE 4 OR MORE: CPT | Mod: 50

## 2023-09-28 PROCEDURE — 20610 DRAIN/INJ JOINT/BURSA W/O US: CPT | Mod: LT

## 2023-09-28 PROCEDURE — 99214 OFFICE O/P EST MOD 30 MIN: CPT | Mod: 25

## 2023-10-03 ENCOUNTER — APPOINTMENT (OUTPATIENT)
Dept: ORTHOPEDIC SURGERY | Facility: CLINIC | Age: 53
End: 2023-10-03
Payer: MEDICARE

## 2023-10-03 ENCOUNTER — APPOINTMENT (OUTPATIENT)
Dept: RADIOLOGY | Facility: CLINIC | Age: 53
End: 2023-10-03

## 2023-10-03 ENCOUNTER — OUTPATIENT (OUTPATIENT)
Dept: OUTPATIENT SERVICES | Facility: HOSPITAL | Age: 53
LOS: 1 days | End: 2023-10-03
Payer: MEDICARE

## 2023-10-03 ENCOUNTER — RESULT REVIEW (OUTPATIENT)
Age: 53
End: 2023-10-03

## 2023-10-03 DIAGNOSIS — Z98.89 OTHER SPECIFIED POSTPROCEDURAL STATES: Chronic | ICD-10-CM

## 2023-10-03 DIAGNOSIS — M92.62 JUVENILE OSTEOCHONDROSIS OF TARSUS, LEFT ANKLE: ICD-10-CM

## 2023-10-03 DIAGNOSIS — M19.072 PRIMARY OSTEOARTHRITIS, LEFT ANKLE AND FOOT: ICD-10-CM

## 2023-10-03 PROCEDURE — 99204 OFFICE O/P NEW MOD 45 MIN: CPT

## 2023-10-03 PROCEDURE — 73630 X-RAY EXAM OF FOOT: CPT | Mod: 26,50

## 2023-10-03 PROCEDURE — 73610 X-RAY EXAM OF ANKLE: CPT | Mod: 26,50

## 2023-10-09 NOTE — PHYSICAL THERAPY INITIAL EVALUATION ADULT - LEVEL OF CONSCIOUSNESS, REHAB EVAL
AAOX4. VSS. Tolerated medication well. Ambulates to bathroom with stand by assist. No pain stated on shift. Admission and 4 eyes on skin complete. No SOB noticed with ambulation. Bradycardia on tele monitor. No falls or injuries on shift. Bed locked in lowest position, call light in reach, and non slip socks placed. Safety maintained throughout shift.        Problem: Adult Inpatient Plan of Care  Goal: Plan of Care Review  Outcome: Ongoing, Progressing  Goal: Patient-Specific Goal (Individualized)  Outcome: Ongoing, Progressing  Goal: Absence of Hospital-Acquired Illness or Injury  Outcome: Ongoing, Progressing  Goal: Optimal Comfort and Wellbeing  Outcome: Ongoing, Progressing  Goal: Readiness for Transition of Care  Outcome: Ongoing, Progressing     Problem: Diabetes Comorbidity  Goal: Blood Glucose Level Within Targeted Range  Outcome: Ongoing, Progressing     Problem: Fall Injury Risk  Goal: Absence of Fall and Fall-Related Injury  Outcome: Ongoing, Progressing      alert

## 2023-10-17 ENCOUNTER — APPOINTMENT (OUTPATIENT)
Dept: ORTHOPEDIC SURGERY | Facility: CLINIC | Age: 53
End: 2023-10-17
Payer: MEDICARE

## 2023-10-17 VITALS
HEIGHT: 69 IN | TEMPERATURE: 98 F | SYSTOLIC BLOOD PRESSURE: 121 MMHG | OXYGEN SATURATION: 98 % | BODY MASS INDEX: 35.55 KG/M2 | WEIGHT: 240 LBS | DIASTOLIC BLOOD PRESSURE: 83 MMHG | HEART RATE: 87 BPM

## 2023-10-17 DIAGNOSIS — M19.072 PRIMARY OSTEOARTHRITIS, LEFT ANKLE AND FOOT: ICD-10-CM

## 2023-10-17 PROCEDURE — 99214 OFFICE O/P EST MOD 30 MIN: CPT

## 2023-11-06 ENCOUNTER — APPOINTMENT (OUTPATIENT)
Dept: ORTHOPEDIC SURGERY | Facility: CLINIC | Age: 53
End: 2023-11-06
Payer: MEDICARE

## 2023-11-06 PROCEDURE — 20610 DRAIN/INJ JOINT/BURSA W/O US: CPT | Mod: 50

## 2023-11-06 PROCEDURE — 99213 OFFICE O/P EST LOW 20 MIN: CPT | Mod: 25

## 2023-11-09 ENCOUNTER — APPOINTMENT (OUTPATIENT)
Dept: INTERNAL MEDICINE | Facility: CLINIC | Age: 53
End: 2023-11-09
Payer: MEDICARE

## 2023-11-09 VITALS
SYSTOLIC BLOOD PRESSURE: 143 MMHG | BODY MASS INDEX: 35.25 KG/M2 | HEIGHT: 69 IN | HEART RATE: 87 BPM | WEIGHT: 238 LBS | OXYGEN SATURATION: 97 % | DIASTOLIC BLOOD PRESSURE: 98 MMHG

## 2023-11-09 DIAGNOSIS — K08.89 OTHER SPECIFIED DISORDERS OF TEETH AND SUPPORTING STRUCTURES: ICD-10-CM

## 2023-11-09 PROCEDURE — 99215 OFFICE O/P EST HI 40 MIN: CPT

## 2023-11-10 ENCOUNTER — APPOINTMENT (OUTPATIENT)
Dept: OTOLARYNGOLOGY | Facility: CLINIC | Age: 53
End: 2023-11-10
Payer: MEDICARE

## 2023-11-10 DIAGNOSIS — H92.01 OTALGIA, RIGHT EAR: ICD-10-CM

## 2023-11-10 DIAGNOSIS — R13.10 DYSPHAGIA, UNSPECIFIED: ICD-10-CM

## 2023-11-10 DIAGNOSIS — J06.9 ACUTE UPPER RESPIRATORY INFECTION, UNSPECIFIED: ICD-10-CM

## 2023-11-10 PROCEDURE — 31231 NASAL ENDOSCOPY DX: CPT | Mod: 59

## 2023-11-10 PROCEDURE — 31579 LARYNGOSCOPY TELESCOPIC: CPT

## 2023-11-10 PROCEDURE — 99215 OFFICE O/P EST HI 40 MIN: CPT | Mod: 25

## 2023-11-10 RX ORDER — OMEPRAZOLE 40 MG/1
40 CAPSULE, DELAYED RELEASE ORAL
Qty: 90 | Refills: 0 | Status: ACTIVE | COMMUNITY
Start: 2023-11-10 | End: 1900-01-01

## 2023-11-10 RX ORDER — METHYLPREDNISOLONE 4 MG/1
4 TABLET ORAL
Qty: 1 | Refills: 0 | Status: ACTIVE | COMMUNITY
Start: 2023-11-10 | End: 1900-01-01

## 2023-11-10 RX ORDER — AZITHROMYCIN 250 MG/1
250 TABLET, FILM COATED ORAL
Qty: 1 | Refills: 0 | Status: ACTIVE | COMMUNITY
Start: 2023-11-10 | End: 1900-01-01

## 2023-11-13 ENCOUNTER — APPOINTMENT (OUTPATIENT)
Dept: ORTHOPEDIC SURGERY | Facility: CLINIC | Age: 53
End: 2023-11-13
Payer: MEDICARE

## 2023-11-13 VITALS
HEIGHT: 69 IN | SYSTOLIC BLOOD PRESSURE: 157 MMHG | DIASTOLIC BLOOD PRESSURE: 80 MMHG | WEIGHT: 238 LBS | TEMPERATURE: 98 F | HEART RATE: 78 BPM | OXYGEN SATURATION: 99 % | BODY MASS INDEX: 35.25 KG/M2

## 2023-11-13 PROCEDURE — 99213 OFFICE O/P EST LOW 20 MIN: CPT

## 2023-12-01 ENCOUNTER — APPOINTMENT (OUTPATIENT)
Dept: ORTHOPEDIC SURGERY | Facility: CLINIC | Age: 53
End: 2023-12-01

## 2023-12-01 RX ORDER — MELOXICAM 7.5 MG/1
7.5 TABLET ORAL
Qty: 30 | Refills: 0 | Status: ACTIVE | COMMUNITY
Start: 2023-10-03 | End: 1900-01-01

## 2023-12-11 ENCOUNTER — NON-APPOINTMENT (OUTPATIENT)
Age: 53
End: 2023-12-11

## 2023-12-14 ENCOUNTER — APPOINTMENT (OUTPATIENT)
Dept: ORTHOPEDIC SURGERY | Facility: CLINIC | Age: 53
End: 2023-12-14
Payer: MEDICARE

## 2023-12-14 DIAGNOSIS — M51.36 OTHER INTERVERTEBRAL DISC DEGENERATION, LUMBAR REGION: ICD-10-CM

## 2023-12-14 PROCEDURE — 99213 OFFICE O/P EST LOW 20 MIN: CPT

## 2023-12-15 ENCOUNTER — APPOINTMENT (OUTPATIENT)
Dept: CT IMAGING | Facility: CLINIC | Age: 53
End: 2023-12-15
Payer: MEDICARE

## 2023-12-15 ENCOUNTER — OUTPATIENT (OUTPATIENT)
Dept: OUTPATIENT SERVICES | Facility: HOSPITAL | Age: 53
LOS: 1 days | End: 2023-12-15

## 2023-12-15 ENCOUNTER — APPOINTMENT (OUTPATIENT)
Dept: INTERNAL MEDICINE | Facility: CLINIC | Age: 53
End: 2023-12-15
Payer: MEDICARE

## 2023-12-15 VITALS
BODY MASS INDEX: 35.25 KG/M2 | WEIGHT: 238 LBS | HEIGHT: 69 IN | DIASTOLIC BLOOD PRESSURE: 82 MMHG | TEMPERATURE: 97.8 F | OXYGEN SATURATION: 100 % | HEART RATE: 80 BPM | SYSTOLIC BLOOD PRESSURE: 157 MMHG

## 2023-12-15 DIAGNOSIS — J32.9 CHRONIC SINUSITIS, UNSPECIFIED: ICD-10-CM

## 2023-12-15 DIAGNOSIS — Z98.89 OTHER SPECIFIED POSTPROCEDURAL STATES: Chronic | ICD-10-CM

## 2023-12-15 PROBLEM — M51.36 LUMBAR DEGENERATIVE DISC DISEASE: Status: ACTIVE | Noted: 2023-05-11

## 2023-12-15 PROCEDURE — 70486 CT MAXILLOFACIAL W/O DYE: CPT | Mod: 26,MH

## 2023-12-15 PROCEDURE — 99215 OFFICE O/P EST HI 40 MIN: CPT

## 2023-12-15 NOTE — PLAN
[FreeTextEntry1] :    ====== chart reviewed in detail since last visit ==========  [] f/u pain management for lumbar spine neural ablation, ortho for knee pain [] f/u ortho , pain management (Dr. feliz cuello) for back pain [] letter saying reasonable health for dental extraction if ent throat evaluation fine [] f/u allergist- dr. casas , dr. daugherty [] f/u neuro, cards, optho, pmr, ortho, pain managemnt, rheum, gi, [] allergy dr. trevin casas [] f/u cards and neuro for vertigo [] cards: dr. chino [] neuro dr. yousif arriola [] f/u gyn for mammo and pap [] 2021 mri brain: partially empty sella turcica - f/u neuro [] dexa scan , mammo.

## 2023-12-15 NOTE — HISTORY OF PRESENT ILLNESS
[FreeTextEntry1] : sinus congestion [de-identified] :  52 yo F w/ h/o stable chronic vertigo, recurrent hives, involuntary movements, allergic sinusitis,here for sinus congestion  - saw ent last month and not better for sore throat and ear pain and then saw allergist two weeks ago and got augmentin for two weeks and not better

## 2023-12-15 NOTE — HISTORY OF PRESENT ILLNESS
[Pain Location] : pain [] : right & left knee [7] : a current pain level of 7/10 [de-identified] : SCOTT SCHMIDT is a 53 year female here for Follow-up for bilateral knee pain which began after a slip and fall ingris wedding causing not only exacerbation of the DJD and possible new lateral meniscal tear but also significant lumbar spine issues. . Pt states she started to see some improvement in her knees this week after getting bilateral knee HA injections on 11/06/23. She is currently scheduled for an lumbar spine neural ablation procedure for ongoing LBP with radiculopathy.

## 2023-12-15 NOTE — PHYSICAL EXAM
[de-identified] : Bilateral Knee Exam: ROM: 0-115 degrees +TTP lateral joint line left knee +Miguel A's test +1 joint effusion No gross instability +valgus deformity NVI

## 2023-12-15 NOTE — DISCUSSION/SUMMARY
[de-identified] : Impression: 52 y/o F with exacerbation of bilateral knee DJD+lateral meniscal tear left knee from a slip and fall early September. Now with overall improvement in both knees s/p HA injections.  Pt has concomitant lumbar disc disease which is more symptomatic than her knees ad is currently under treatment by Dr Mosquera and a pain specialist he referred her to.   Plan: At this time, she has seen improvement in her knees from the HA injections she previously received.  Her priority with treatment no should be her lumbar spine condition. She will proceed with lumbar spine treatment as per pain management recommendations. Once she is given clearance, she may start her previously recommended supervised physical therapy on her knees. She will follow up in 2 mos. Once lumbar issue imroves if she still has left knee symptoms would consider arthrocopic partial menisectomy

## 2023-12-15 NOTE — PHYSICAL EXAM
[No Acute Distress] : no acute distress [Well Nourished] : well nourished [Well Developed] : well developed [Well-Appearing] : well-appearing [Normal Voice/Communication] : normal voice/communication [Normal Sclera/Conjunctiva] : normal sclera/conjunctiva [Normal Outer Ear/Nose] : the outer ears and nose were normal in appearance [No JVD] : no jugular venous distention [No Respiratory Distress] : no respiratory distress  [No Accessory Muscle Use] : no accessory muscle use [Clear to Auscultation] : lungs were clear to auscultation bilaterally [Normal Rate] : normal rate  [Regular Rhythm] : with a regular rhythm [Normal S1, S2] : normal S1 and S2 [No Murmur] : no murmur heard [Speech Grossly Normal] : speech grossly normal [Memory Grossly Normal] : memory grossly normal [Normal Affect] : the affect was normal [Normal Mood] : the mood was normal [Normal Insight/Judgement] : insight and judgment were intact [de-identified] : uses walker

## 2024-01-01 NOTE — DISCHARGE NOTE PROVIDER - NPI NUMBER (FOR SYSADMIN USE ONLY) :
Progress Note reviewed and summarized for off-service hand off on  1/17 by JENNIFER    RSV PROPHYLAXIS:   Maternal RSV vaccine [Abrysvo]: [ _ ] Yes  [ _ ] No  SYNAGIS [palivizumab] candidate [ _ ] Yes  [ _ ] No;   Received SYNAGIS [palivizumab]? : [ _ ] Yes  [ _ ] No,  IF yes, date _________        or   [ _ ] ELIGIBLE AT A LATER DATE   - [ _ ]<29 weeks      [ _ ]<32 weeks and O2 use dalia 28 days    [ _ ]  other criteria.   Received BEYFORTUS [Nirsevimab] [ _ ] Yes  [ _ ] No  IF yes, date _________         or    [ _ ] Declined RSV Prophylaxis     CIrcumcision:   Hip US rec:    Neurodevelop eval?	  CPR class done?  	  PVS at DC?  Vit D at DC?	  FE at DC?    G6PD screen sent on  ____ . Result ______ . 	    PMD:          Name:  ______________ _             Contact information:  ______________ _  Pharmacy: Name:  ______________ _              Contact information:  ______________ _    Follow-up appointments (list):      [ _ ] Discharge time spent >30 min    [ _ ] Car Seat Challenge lasting 90 min was performed. Today I have reviewed and interpreted the nurses’ records of pulse oximetry, heart rate and respiratory rate and observations during testing period. Car Seat Challenge  passed. The patient is cleared to begin using rear-facing car seat upon discharge. Parents were counseled on rear-facing car seat use.     [UNKNOWN],[[id number]],[[id number]]

## 2024-01-28 NOTE — OCCUPATIONAL THERAPY INITIAL EVALUATION ADULT - BALANCE DISTURBANCE, SYSTEM IMPAIRMENT CONTRIBUTE, REHAB EVAL
Abdomen soft, non-tender and non-distended, no rebound, no guarding and no masses. no hepatosplenomegaly. musculoskeletal

## 2024-02-07 ENCOUNTER — APPOINTMENT (OUTPATIENT)
Dept: ORTHOPEDIC SURGERY | Facility: CLINIC | Age: 54
End: 2024-02-07

## 2024-02-09 ENCOUNTER — APPOINTMENT (OUTPATIENT)
Dept: OTOLARYNGOLOGY | Facility: CLINIC | Age: 54
End: 2024-02-09
Payer: MEDICARE

## 2024-02-09 VITALS
HEIGHT: 70 IN | DIASTOLIC BLOOD PRESSURE: 84 MMHG | TEMPERATURE: 97.9 F | HEART RATE: 79 BPM | SYSTOLIC BLOOD PRESSURE: 121 MMHG | BODY MASS INDEX: 33.5 KG/M2 | WEIGHT: 234 LBS | OXYGEN SATURATION: 100 %

## 2024-02-09 DIAGNOSIS — K21.9 GASTRO-ESOPHAGEAL REFLUX DISEASE W/OUT ESOPHAGITIS: ICD-10-CM

## 2024-02-09 DIAGNOSIS — H92.01 OTALGIA, RIGHT EAR: ICD-10-CM

## 2024-02-09 DIAGNOSIS — J02.9 ACUTE PHARYNGITIS, UNSPECIFIED: ICD-10-CM

## 2024-02-09 PROCEDURE — 31579 LARYNGOSCOPY TELESCOPIC: CPT

## 2024-02-09 PROCEDURE — 99215 OFFICE O/P EST HI 40 MIN: CPT | Mod: 25

## 2024-02-09 NOTE — PROCEDURE
[de-identified] : -   Pre-operative Diagnosis: Dysphonia, Throat discomfort Post-operative Diagnosis: laryngopharyngeal reflux Anesthesia: Topical - 1 % Lidocaine/Phenylephrine Procedure: Flexible Laryngoscopy with Stroboscopy - [facility] [id number]   Procedure Details: The patient was placed in the sitting position. After decongestant and anesthesia were applied the laryngoscope was passed. The nasal cavities, nasopharynx, oropharynx, hypopharynx, and larynx were all examined. Vocal folds were examined during respiration and phonation. The following findings were noted:   Findings: Nose: Septum is midline, turbinates are normal, nasal airways patent, mucosa normal Nasopharynx: Adenoids normal, no masses, eustachian tube normal Oropharynx: Pharyngeal walls symmetric and without lesion. Tonsils/fossae symmetric Hypopharynx: Hypopharynx and pyriform sinuses without lesion. No masses or asymmetry. + pooling of secretions. Larynx: Epiglottis and aryepiglottic folds were sharp and crisp bilaterally. Bilateral false vocal folds normal appearance. Airway was widely patent. + postcricoid edema, erythema of the vocal process   Strobe Exam Ratings   TVF Appearance: normal, mild erythema of the vocal process TVF Mobility: normal Edema/hypertrophy: normal, + postcricoid edema Mucus on TVF: normal Glottic Closure: normal/adequate Mucosal Wave: normal Amplitude of Vibration: normal Phase: symmetric Supraglottic Hyperfunction: none Other Findings: none   Condition: Stable. Patient tolerated procedure well.   Complications: None

## 2024-02-09 NOTE — HISTORY OF PRESENT ILLNESS
[de-identified] : 11/10/23: 54 y/o F is presenting with sore throat for the past 2 weeks. She also endorses odynophagia. She took ibuprofen and it initially improved, and then worsened over the past 3 days. No issues chewing, eating, or swallowing. No cough. She has regurgitation of food for the past 2 days. No issues breathing. She denies changes in her voice. She reports that she saw Dr. Blas yesterday and was found to have a ?nodule on her left tonsil. She has history of anaphylaxis and hives where her throat closed after Venofer infusion. She has known reflux and is on Famotidine.  Diet: +tea, soda, chocolate, onion, carbonated beverages, citrus  -coffee, mint, tomato, garlic, blueberry, spicy food water intake: 8 bottles of [place] late night eating: none  She also noticed bright red blood when she blew her nose and discolored nasal drainage. She still has yellow nasal drainage. She has history of sinus surgery in 2015. She also has right ear pain for the past 2 weeks. No otorrhea, changes in hearing, tinnitus. She has h/o vertigo which is managed by Dr. Goode.  She reports that Dr. Blas also wanted to have her thyroid evaluated because she has history of "thyroid cluster."  - [FreeTextEntry1] : 2/9/24: Patient has completed Z-Shiraz, Medrol Dosepak as well as nasal saline nasal steroids. She did not think Zpack was strong enough so she called her allergist was prescribed Augmentin for 2 weeks and felt better. Patient also been adhering to low acid diet and on antireflux medications. She noticed no improvement in terms of her throat. Patient had upper & lower endoscopy last week and is not sure her results and developed sore throat and right earache for the past week. She endorses odynophagia and "scratchy throat" since procedure. She describes sensation as "aching" and intermittent "throbbing." She also had a recent esophogram and was told it was normal. She denies changes in hearing, tinnitus, otorrhea, or vertiginous symptoms.

## 2024-02-09 NOTE — ASSESSMENT
[FreeTextEntry1] : - 54 y/o F is presenting with sore throat, odynophagia for the past 2 weeks. She also has right ear pain, blood tinged and discolored nasal drainage. On nasal endoscopy she was found to have postsurgical changes. On laryngoscopy/ stroboscopy she was found to have LPR. Based on history and physical exam, I do believe there is a component of laryngopharyngeal reflux. At this time I am recommending dietary and behavioral change to reduce acid in the diet. I am also recommending omeprazole as well as continuing Famotidine. I also believe she is suffering from an upper respiratory infection. I am recommending rest, tylenol, motrin, hydration, medrol dose pack and a zpack. I am also recommending nasal saline rinses and nasal steroids. She brought up a head and neck ultrasound report which showed no thyroid nodules. I recommended she follow up with ordering provider to review results. Follow up with me in 3 months.   2/9/24: Patient has completed Z-Shiraz, Medrol Dosepak as well as nasal saline nasal steroids. She did not think Zpack was strong enough so she called her allergist was prescribed Augmentin for 2 weeks and felt better.  Patient also been adhering to low acid diet and on antireflux medications. Patient had upper & lower endoscopy last week and is not sure her results and developed sore throat and right earache for the past week. She endorses odynophagia, right otalgia and "scratchy throat" since procedure. I suspect that sore throat and earache are likely from recent procedure. I asked her to send us a copy of results for us to review. If sore throat persists for one more week consider CT neck. If pain persists consider Tylenol or Motrin.    -Continue dietary and behavioral modification to reduce acid reflux, handout given -Continue Omeprazole qd, continue Famotidine -Voice hygiene, increase hydration, sips of water throughout the day, avoid throat clearing -Follow up on results of her upper/ lower endoscopy  -Tylenol/ Motrin PRN

## 2024-02-09 NOTE — REASON FOR VISIT
[Initial Consultation] : an initial consultation for [FreeTextEntry2] : sore throat, right ear pain

## 2024-02-22 ENCOUNTER — APPOINTMENT (OUTPATIENT)
Dept: INTERNAL MEDICINE | Facility: CLINIC | Age: 54
End: 2024-02-22

## 2024-02-29 ENCOUNTER — APPOINTMENT (OUTPATIENT)
Dept: OTOLARYNGOLOGY | Facility: CLINIC | Age: 54
End: 2024-02-29

## 2024-03-02 NOTE — ASSESSMENT
[FreeTextEntry1] : - 54 y/o F is presenting with sore throat, odynophagia for the past 2 weeks. She also has right ear pain, blood tinged and discolored nasal drainage. On nasal endoscopy she was found to have postsurgical changes. On laryngoscopy/ stroboscopy she was found to have LPR. Based on history and physical exam, I do believe there is a component of laryngopharyngeal reflux. At this time I am recommending dietary and behavioral change to reduce acid in the diet. I am also recommending omeprazole as well as continuing Famotidine. I also believe she is suffering from an upper respiratory infection. I am recommending rest, tylenol, motrin, hydration, medrol dose pack and a zpack. I am also recommending nasal saline rinses and nasal steroids. She brought up a head and neck ultrasound report which showed no thyroid nodules. I recommended she follow up with ordering provider to review results. Follow up with me in 3 months.   2/9/24: Patient has completed Z-Shiraz, Medrol Dosepak as well as nasal saline nasal steroids. She did not think Zpack was strong enough so she called her allergist was prescribed Augmentin for 2 weeks and felt better.  Patient also been adhering to low acid diet and on antireflux medications. Patient had upper & lower endoscopy last week and is not sure her results and developed sore throat and right earache for the past week. She endorses odynophagia, right otalgia and "scratchy throat" since procedure. I suspect that sore throat and earache are likely from recent procedure. I asked her to send us a copy of results for us to review. If sore throat persists for one more week consider CT neck. If pain persists consider Tylenol or Motrin.    2/29/24  -Continue dietary and behavioral modification to reduce acid reflux, handout given -Continue Omeprazole qd, continue Famotidine -Voice hygiene, increase hydration, sips of water throughout the day, avoid throat clearing -Follow up on results of her upper/ lower endoscopy  -Tylenol/ Motrin PRN

## 2024-03-02 NOTE — HISTORY OF PRESENT ILLNESS
[de-identified] : 11/10/23: 54 y/o F is presenting with sore throat for the past 2 weeks. She also endorses odynophagia. She took ibuprofen and it initially improved, and then worsened over the past 3 days. No issues chewing, eating, or swallowing. No cough. She has regurgitation of food for the past 2 days. No issues breathing. She denies changes in her voice. She reports that she saw Dr. Blas yesterday and was found to have a ?nodule on her left tonsil. She has history of anaphylaxis and hives where her throat closed after Venofer infusion. She has known reflux and is on Famotidine.  Diet: +tea, soda, chocolate, onion, carbonated beverages, citrus  -coffee, mint, tomato, garlic, blueberry, spicy food water intake: 8 bottles of [place] late night eating: none  She also noticed bright red blood when she blew her nose and discolored nasal drainage. She still has yellow nasal drainage. She has history of sinus surgery in 2015. She also has right ear pain for the past 2 weeks. No otorrhea, changes in hearing, tinnitus. She has h/o vertigo which is managed by Dr. Goode.  She reports that Dr. Blas also wanted to have her thyroid evaluated because she has history of "thyroid cluster."  - 2/9/24: Patient has completed Z-Shiraz, Medrol Dosepak as well as nasal saline nasal steroids. She did not think Zpack was strong enough so she called her allergist was prescribed Augmentin for 2 weeks and felt better. Patient also been adhering to low acid diet and on antireflux medications. She noticed no improvement in terms of her throat. Patient had upper & lower endoscopy last week and is not sure her results and developed sore throat and right earache for the past week. She endorses odynophagia and "scratchy throat" since procedure. She describes sensation as "aching" and intermittent "throbbing." She also had a recent esophogram and was told it was normal. She denies changes in hearing, tinnitus, otorrhea, or vertiginous symptoms. - [FreeTextEntry1] : 2/29/24

## 2024-03-02 NOTE — PROCEDURE
[de-identified] : -   Pre-operative Diagnosis: Dysphonia, Throat discomfort Post-operative Diagnosis: laryngopharyngeal reflux Anesthesia: Topical - 1 % Lidocaine/Phenylephrine Procedure: Flexible Laryngoscopy with Stroboscopy - [facility] [id number]   Procedure Details: The patient was placed in the sitting position. After decongestant and anesthesia were applied the laryngoscope was passed. The nasal cavities, nasopharynx, oropharynx, hypopharynx, and larynx were all examined. Vocal folds were examined during respiration and phonation. The following findings were noted:   Findings: Nose: Septum is midline, turbinates are normal, nasal airways patent, mucosa normal Nasopharynx: Adenoids normal, no masses, eustachian tube normal Oropharynx: Pharyngeal walls symmetric and without lesion. Tonsils/fossae symmetric Hypopharynx: Hypopharynx and pyriform sinuses without lesion. No masses or asymmetry. + pooling of secretions. Larynx: Epiglottis and aryepiglottic folds were sharp and crisp bilaterally. Bilateral false vocal folds normal appearance. Airway was widely patent. + postcricoid edema, erythema of the vocal process   Strobe Exam Ratings   TVF Appearance: normal, mild erythema of the vocal process TVF Mobility: normal Edema/hypertrophy: normal, + postcricoid edema Mucus on TVF: normal Glottic Closure: normal/adequate Mucosal Wave: normal Amplitude of Vibration: normal Phase: symmetric Supraglottic Hyperfunction: none Other Findings: none   Condition: Stable. Patient tolerated procedure well.   Complications: None

## 2024-03-07 ENCOUNTER — APPOINTMENT (OUTPATIENT)
Dept: ORTHOPEDIC SURGERY | Facility: CLINIC | Age: 54
End: 2024-03-07
Payer: MEDICARE

## 2024-03-07 DIAGNOSIS — S80.01XA CONTUSION OF RIGHT KNEE, INITIAL ENCOUNTER: ICD-10-CM

## 2024-03-07 DIAGNOSIS — M54.9 DORSALGIA, UNSPECIFIED: ICD-10-CM

## 2024-03-07 PROCEDURE — 20610 DRAIN/INJ JOINT/BURSA W/O US: CPT | Mod: 50

## 2024-03-07 PROCEDURE — 99214 OFFICE O/P EST MOD 30 MIN: CPT | Mod: 25

## 2024-03-07 NOTE — HISTORY OF PRESENT ILLNESS
[de-identified] : SCOTT SCHMIDT 53-year female Follow-up for bilateral knee pain. Pt states that she has seen improvement in her knees from the HA injections she previously received.  She is experiencing occasional pain and swelling. She is getting treatment for her lumbar spine condition. Once she is given clearance we can consider left knee arthroscopy.  SHe is also complaining , and has been since hospital d/c of right knee lateral joint pain.

## 2024-03-07 NOTE — DISCUSSION/SUMMARY
[de-identified] : She is still in the hands of the Pain management folks to control her severe back pain since the fall in September.  Left knee ultimately would benefit from an Arthroscopic meniscetomy but can't consider this till her back issues are under better control.  DUBOSE hleped both knees but is wearing off and not due again for 2 months.  Right knee also has some lateral joint meniscal symptoms but never had MRI ( as we had with the left on initial work-up at [facility]) Plan  Bilateral cortisone injections today Continue low back treatment with Dr. Jansen at \[facility]".  MRI right knee to rule out lateral meniscus tear. (She has had previous surgery on that knee)  F/U 8 weeks to consider new HA injections unless we can move forward toward treating the knee if lumbar issue improve enough to do Knee PT.

## 2024-03-07 NOTE — PHYSICAL EXAM
[de-identified] : Left knee lateral joint and chyna-patellar pain /tenderness  ROM 0-120 degrees Right knee lateral joint and medial facet tenderness at patella ROM 0-120 degrees.

## 2024-03-22 ENCOUNTER — NON-APPOINTMENT (OUTPATIENT)
Age: 54
End: 2024-03-22

## 2024-03-25 ENCOUNTER — NON-APPOINTMENT (OUTPATIENT)
Age: 54
End: 2024-03-25

## 2024-03-26 ENCOUNTER — OUTPATIENT (OUTPATIENT)
Dept: OUTPATIENT SERVICES | Facility: HOSPITAL | Age: 54
LOS: 1 days | End: 2024-03-26

## 2024-03-26 ENCOUNTER — APPOINTMENT (OUTPATIENT)
Dept: MRI IMAGING | Facility: CLINIC | Age: 54
End: 2024-03-26
Payer: MEDICARE

## 2024-03-26 DIAGNOSIS — Z98.89 OTHER SPECIFIED POSTPROCEDURAL STATES: Chronic | ICD-10-CM

## 2024-03-26 PROCEDURE — 73721 MRI JNT OF LWR EXTRE W/O DYE: CPT | Mod: 26,RT,MH

## 2024-04-04 ENCOUNTER — APPOINTMENT (OUTPATIENT)
Dept: INTERNAL MEDICINE | Facility: CLINIC | Age: 54
End: 2024-04-04

## 2024-05-02 ENCOUNTER — LABORATORY RESULT (OUTPATIENT)
Age: 54
End: 2024-05-02

## 2024-05-03 ENCOUNTER — APPOINTMENT (OUTPATIENT)
Dept: INTERNAL MEDICINE | Facility: CLINIC | Age: 54
End: 2024-05-03
Payer: MEDICARE

## 2024-05-03 VITALS
BODY MASS INDEX: 31.57 KG/M2 | SYSTOLIC BLOOD PRESSURE: 142 MMHG | OXYGEN SATURATION: 100 % | TEMPERATURE: 97.2 F | HEART RATE: 70 BPM | WEIGHT: 220 LBS | DIASTOLIC BLOOD PRESSURE: 85 MMHG

## 2024-05-03 DIAGNOSIS — R91.1 SOLITARY PULMONARY NODULE: ICD-10-CM

## 2024-05-03 DIAGNOSIS — Z01.818 ENCOUNTER FOR OTHER PREPROCEDURAL EXAMINATION: ICD-10-CM

## 2024-05-03 DIAGNOSIS — M89.9 DISORDER OF BONE, UNSPECIFIED: ICD-10-CM

## 2024-05-03 DIAGNOSIS — E04.1 NONTOXIC SINGLE THYROID NODULE: ICD-10-CM

## 2024-05-03 PROCEDURE — G2211 COMPLEX E/M VISIT ADD ON: CPT

## 2024-05-03 PROCEDURE — G2212 PROLONG OUTPT/OFFICE VIS: CPT

## 2024-05-03 PROCEDURE — 93000 ELECTROCARDIOGRAM COMPLETE: CPT

## 2024-05-03 PROCEDURE — 99215 OFFICE O/P EST HI 40 MIN: CPT

## 2024-05-06 ENCOUNTER — APPOINTMENT (OUTPATIENT)
Dept: ORTHOPEDIC SURGERY | Facility: CLINIC | Age: 54
End: 2024-05-06
Payer: MEDICARE

## 2024-05-06 PROBLEM — E04.1 THYROID NODULE: Status: ACTIVE | Noted: 2024-05-06

## 2024-05-06 PROBLEM — M89.9 BONE LESION: Status: ACTIVE | Noted: 2024-05-06

## 2024-05-06 PROBLEM — R91.1 LUNG NODULE: Status: ACTIVE | Noted: 2024-05-06

## 2024-05-06 PROBLEM — Z01.818 PREOP EXAMINATION: Status: ACTIVE | Noted: 2019-01-30

## 2024-05-06 PROCEDURE — 99213 OFFICE O/P EST LOW 20 MIN: CPT

## 2024-05-06 NOTE — PHYSICAL EXAM
[Left] : left foot and ankle [Mild] : mild diffused ankle swelling [NL (40)] : plantar flexion 40 degrees [NL 30)] : inversion 30 degrees [NL (20)] : eversion 20 degrees [5___] : eversion 5[unfilled]/5 [] : no difficulty with single heel rise [FreeTextEntry9] : ankle ROM limited secondary to pain [de-identified] : bcr [TWNoteComboBox7] : dorsiflexion 0 degrees

## 2024-05-06 NOTE — PLAN
[FreeTextEntry1] :    ====== chart reviewed in detail since last vi [] f/u ortho knee for injections [] get modified barium swallow on 5/21 for swallowing [] f/u ortho foot , pt for knee/ankle, pain management  [] f/u pain management for lumbar spine neural ablation, ortho for knee pain [] f/u ortho , pain management (Dr. feliz cuello) for back pain [] f/u allergist- dr. casas , dr. daugherty [] f/u neuro, cards, optho, pmr, ortho, pain managemnt, rheum, gi, [] f/u cards and neuro for vertigo [] cards: dr. chino [] neuro dr. yousif arriola [] f/u gyn for mammo and pap [] 2021 mri brain: partially empty sella turcica - f/u neuro  sit ==========

## 2024-05-06 NOTE — PHYSICAL EXAM
[No Acute Distress] : no acute distress [Well Nourished] : well nourished [Well Developed] : well developed [Well-Appearing] : well-appearing [Normal Voice/Communication] : normal voice/communication [Normal Sclera/Conjunctiva] : normal sclera/conjunctiva [Normal Outer Ear/Nose] : the outer ears and nose were normal in appearance [No JVD] : no jugular venous distention [No Respiratory Distress] : no respiratory distress  [No Accessory Muscle Use] : no accessory muscle use [Clear to Auscultation] : lungs were clear to auscultation bilaterally [Normal Rate] : normal rate  [Regular Rhythm] : with a regular rhythm [Normal S1, S2] : normal S1 and S2 [No Murmur] : no murmur heard [Soft] : abdomen soft [Non Tender] : non-tender [Non-distended] : non-distended [No HSM] : no HSM [Normal Bowel Sounds] : normal bowel sounds [Speech Grossly Normal] : speech grossly normal [Memory Grossly Normal] : memory grossly normal [Normal Affect] : the affect was normal [Normal Mood] : the mood was normal [Normal Insight/Judgement] : insight and judgment were intact [de-identified] : uses walker

## 2024-05-06 NOTE — ASSESSMENT
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

## 2024-05-06 NOTE — DISCUSSION/SUMMARY
[Medication Risks Reviewed] : Medication risks reviewed [de-identified] : Patient seen and examined.  Patient presents today for evaluation for acute on chronic exacerbation of left ankle and foot pain.  Based on her history, physical examination, imaging I discussed with her that her symptoms are suggestive of a low ankle sprain with associated midfoot arthritic flare.   I Discussed with patient the pathomechanics and pathophysiology of this condition.  Discussed with patient that I utilize a ladder analogy when determining treatment plans for ankle sprains.  Lowest rung on the ladder and easiest thing to do involves a trial of cam boot immobilization for 10 to 14 days, anti-inflammatories in the form of Mobic (a prescription was provided), weightbearing as tolerated, compression sock (information provided), and a plantar fascia night splint (information provided).  We discussed the risks and benefits associated with nonsteroidal anti-inflammatories.  Those risks include bleeding, kidney, GI issues.  Patient was instructed to take the medications with food.  And she was instructed to discontinue them should any abdominal or stomach discomfort occur.  I will see her back at 2 weeks time.  At that point we will reevaluate her degree of instability and initiate a functional rehabilitation protocol.  She will continue to maintain sleeping in the night splint for approximately 6 weeks in total.  Additionally she will begin on range of motion, edema control no passive or active inversion is to occur for at least 6 weeks.  Should her symptoms fail to improve or worsen by 6 weeks we will consider getting an advanced imaging in the form of MRI to ensure that we are not missing any other concurrent soft tissue pathology or chondral injuries that could be contributing to her symptomatology.  I am optimistic based on her history and physical examination that she will be able to heal this without requiring surgical fixation.  In the event that surgery is needed we discussed that appropriate surgical treatment would be based upon MRI findings, which would include either labral repair versus reconstruction and potentially arthroscopic surgery, limited versus extensive debridement and addressing any chondral lesions that are present and/or any other associated pathology.  All questions were asked and answered.  Patient is in agreement with the plan.  She will follow-up accordingly.  Patient has symptomatic midfoot arthrosis that I believe will calm down with the nonsteroidal anti-inflammatories and cam boot immobilization.  However we did discuss that subsequent treatment would involve stiff rigid soled shoes with a carbon fiber insert to help control the motion in addition to the medication.  Should her symptoms fail to improve or worsen we can consider low-dose steroid injections either into the ankle or into the midfoot to help alleviate symptoms.  The patient's current medication management of their orthopedic diagnosis was reviewed today:  (1) We discussed a comprehensive treatment plan that included possible pharmaceutical management involving the use of prescription strength medications including but not limited to options such as oral Naprosyn 500mg BID, once daily Meloxicam 15 mg, or 500-650 mg Tylenol versus over the counter oral medications and topical prescription NSAID Pennsaid vs over the counter Voltaren gel.  (2) There is a moderate risk of morbidity with further treatment, especially from use of prescription strength medications and possible side effects of these medications which include upset stomach with oral medications, skin reactions to topical medications and cardiac/renal issues with long term use.  (3) I recommended that the patient follow-up with their medical physician to discuss any significant specific potential issues with long term medication use such as interactions with current medications or with exacerbation of underlying medical comorbidities.  (4) The benefits and risks associated with use of injectable, oral or topical, prescription and over the counter anti-inflammatory medications were discussed with the patient. The patient voiced understanding of the risks including but not limited to bleeding, stroke, kidney dysfunction, heart disease, and were referred to the black box warning label for further information.    I spent >45 minutes seeing, evaluating, and documenting this encounter with >50% of that time spent discussing the treatment options and plan.  Plan for next visit: initiate PT, provide ASO. Consider CSI if failing to improve. *** Seen and examined.  Patient presents today for follow-up evaluation of left traumatic ankle pain.  Based on the history, physical examination, imaging I discussed with her that her symptoms are suggestive of a symptomatic low ankle sprain.  She is progressing well with the cam boot and compression sock and Mobic as her symptoms are improving and the swelling is improving.  Today we discussed at length treatment options for this which the next course of treatment would be transitioning to an ASO and performing functional ankle rehabilitation.  Prescription for physical therapy was given to her.  No active or passive inversion for another 3 weeks.  Additionally we discussed the importance of utilizing a plantar fascia night splint at night to help control ankle position.  I will see her back in approximately 4 weeks time to assess her symptoms.  Should her symptoms fail to improve or worsen we may consider an MRI and potentially a low-dose CSI injection to help get her over the hump of her recovery.  Plan for next visit: SS pain and tenderness along peroneal tendons as well as ATFL CFL.  Consider MRI versus CSI.   I spent >45 minutes seeing, evaluating, and documenting this encounter with >50% of that time spent discussing the treatment options and plan. ******** 11/13 Patient seen and examined.  Patient presents today following up for left ankle sprain.  Based on history, physical examination, imaging I discussed with her that she is making significant improvement in her ankle stability.  On examination there was no significant tenderness along peroneal tendons as well as at the ATFL CFL and ankle felt stable to anterior drawer.  I discussed with her at this juncture I would like her to continue with physical therapy and the inversion restriction is been removed.  I told her the mainstay of full recovery is performing functional ankle rehabilitation.  I instructed her to take the physical therapy prescription and the protocol that we gave her last time and give that to her therapist for what we want her to focus on.  I want her to continue to wear the ASO for another 6 weeks and to continue to use the night splint for another 6 weeks.  I will plan to see her back in approximately 4 weeks time.  Should her symptoms fail to improve or worsen we can consider a one-time low-dose steroid injection.  However I am optimistic that she will continue to improve without that being necessary.  All questions were asked and answered.  Patient in agreement with the plan.  Patient will plan to follow-up accordingly.  Plan for next visit: 1.  Assess drawer, assess peroneal tendon tenderness, assess joint line pain consider CSI *** L ankle low ankle sprain stable, lachman, mild ttp AL gutter, mild synovitis Recommend continued functional ankle PT Recommend/encourage HEP to strengthen ankle RTC 6 weeks if shes improving continue strengthening  Pt has a court case ,  Dr. Schroeder bc of her fall.    next visit: if not getting better will consider MR and consider L dose steroid injection

## 2024-05-06 NOTE — HISTORY OF PRESENT ILLNESS
[FreeTextEntry1] : lung nodule [de-identified] :  52 yo F w/ h/o stable chronic vertigo, recurrent hives, involuntary movements, allergic sinusitis,here for lung nodule  no issue with anestheisa with dental before swallowing issue for a while- been seeing gi and ent. been [facility] with [facility] - both knees bother her about the same-  feels knee and ankle weak and doing pt

## 2024-05-06 NOTE — HISTORY OF PRESENT ILLNESS
[de-identified] : Ms. SCOTT SCHMIDT is a very pleasant 53 year old female right hand dominant, who presents today for an evaluation of bilateral foot pain. Date of Injury/Onset: 09/03/2023 Pain: At Rest: 4/10 With Activity: 7/10 Mechanism of injury: Patient was at a wedding ceremony when she fell on a wet floor This is not a Work Related Injury being treated under Worker's Compensation. This is not an athletic injury occurring associated with an interscholastic or organized sports team. Quality of symptoms: Throbbing, sharp, shooting Improves with: None Worse with: When standing and walking Prior treatment: Occupational therapy, physical therapy, boot Prior Imaging: MRIs, X-rays done at [facility] Reports Available For Review Today: MRIs and X-rays done and X-ray done today @ [facility] Out of work/sport: Patient is disabled School/Sport/Position/Occupation: N/A Personal goal: Additional Information: [None]  Patient seen and examined. Patient is presenting today with acute on chronic exacerbation of left ankle and foot pain. Patient states she has had a prior ankle sprain and was at a wedding last month and had another fall sustaining further trauma to the ankle. She states she was brought to the emergency room at [facility] x-rays were performed as well as an MRI of the ankle. She was placed in a cam boot and instructed to be weightbearing as tolerated. She denies being seen or evaluated by any other doctors or had any formal treatment, therapy, injections or surgery to address this issue since she left the emergency room. She denies any fevers chills night sweats. Denies numbness or tingling left lower extremity. She is here for further evaluation and management. *** The patient is a 53 year old right hand dominant female who presents today for follow up evaluation of left foot pain and ankle pain. She says  Patient seen and examined.  Patient presents today following up for left ankle pain./Ankle sprain.  She has been compliant with protected weightbearing in cam boot immobilization.  She has been using it at night because she is been unable to obtain the plantar fascia night splint.  She been compliant taking the medication states that it helps.  She states her swelling is much improved.  Her symptoms are improving however they are still present.  She denies any interval trauma or injury.  Denies any numbness or tingling left lower extremity.  She is here for routine follow-up evaluation management.   *** 11/13 Patient seen and examined. Patient presents today following up for left ankle Sprain.  Patient states she was compliant with physical therapy she has been compliant wearing the ASO.  She has been compliant wearing the plantar fascia night splint at night.  She states her ankle swelling is also much improved with respect to the compression sock.  Her symptoms are improving however she still endorses occasional soreness/pain however she is significantly improved since our first evaluation.  She does endorse having worsening knee pain that she is currently being treated by doctors as a lot of as well as back pain that is being treated by a pain management physician.  Additionally her health has been compromised recently with a respiratory infection that she is currently on antibiotics and steroids for.  She denies any interval trauma or injury.  Denies fevers chills night sweats today.  She is here for further evaluation management.  **** 12/1/23 Patient seen and examined. Patient presents today following up for left ankle sprain.  05/06/2024 CRYSTAL 53 year F is here to follow up on B/L foot, s/p fall 9/3/23 where she injured her ankles, knees and her back. Patient states some mild improvement since last visit, however still has persistent shooting pains in B/L feet. Pt states she does not have the balance yet, patient had been compliant with ASO and cane or walker. Patient reports she is starting PT, however is starting her with back strengthening prior to addressing ankle. Of note, pt reports new onset decreased sensation in bilateral feet while biking in PT. Pt takes meloxicam every few days for pain.

## 2024-05-08 ENCOUNTER — NON-APPOINTMENT (OUTPATIENT)
Age: 54
End: 2024-05-08

## 2024-05-08 LAB
25(OH)D3 SERPL-MCNC: 29.6 NG/ML
ALBUMIN SERPL ELPH-MCNC: 4.5 G/DL
ALP BLD-CCNC: 91 U/L
ALT SERPL-CCNC: 9 U/L
ANION GAP SERPL CALC-SCNC: 16 MMOL/L
AST SERPL-CCNC: 15 U/L
BILIRUB SERPL-MCNC: 0.2 MG/DL
BUN SERPL-MCNC: 9 MG/DL
CALCIUM SERPL-MCNC: 9.4 MG/DL
CHLORIDE SERPL-SCNC: 100 MMOL/L
CHOLEST SERPL-MCNC: 236 MG/DL
CO2 SERPL-SCNC: 23 MMOL/L
CREAT SERPL-MCNC: 0.76 MG/DL
EGFR: 92 ML/MIN/1.73M2
FERRITIN SERPL-MCNC: 87 NG/ML
GLUCOSE SERPL-MCNC: 95 MG/DL
HDLC SERPL-MCNC: 71 MG/DL
IRON SATN MFR SERPL: 12 %
IRON SERPL-MCNC: 40 UG/DL
LDLC SERPL CALC-MCNC: 154 MG/DL
NONHDLC SERPL-MCNC: 165 MG/DL
POTASSIUM SERPL-SCNC: 5 MMOL/L
PROT SERPL-MCNC: 7.6 G/DL
SODIUM SERPL-SCNC: 140 MMOL/L
TIBC SERPL-MCNC: 328 UG/DL
TRIGL SERPL-MCNC: 67 MG/DL
TSH SERPL-ACNC: 1.07 UIU/ML
UIBC SERPL-MCNC: 288 UG/DL
VIT B12 SERPL-MCNC: 672 PG/ML

## 2024-05-09 ENCOUNTER — NON-APPOINTMENT (OUTPATIENT)
Age: 54
End: 2024-05-09

## 2024-05-13 ENCOUNTER — APPOINTMENT (OUTPATIENT)
Dept: ORTHOPEDIC SURGERY | Facility: CLINIC | Age: 54
End: 2024-05-13

## 2024-05-15 ENCOUNTER — APPOINTMENT (OUTPATIENT)
Dept: ORTHOPEDIC SURGERY | Facility: CLINIC | Age: 54
End: 2024-05-15
Payer: MEDICARE

## 2024-05-15 DIAGNOSIS — M17.0 BILATERAL PRIMARY OSTEOARTHRITIS OF KNEE: ICD-10-CM

## 2024-05-15 DIAGNOSIS — M54.16 RADICULOPATHY, LUMBAR REGION: ICD-10-CM

## 2024-05-15 PROCEDURE — 20610 DRAIN/INJ JOINT/BURSA W/O US: CPT | Mod: 50

## 2024-05-15 PROCEDURE — 99214 OFFICE O/P EST MOD 30 MIN: CPT | Mod: 25

## 2024-05-15 NOTE — HISTORY OF PRESENT ILLNESS
[de-identified] : SCOTT SCHMIDT 53-year female Follow-up for MRI review of right knee. Pt started Physical Therapy 2 weeks ago and is going 3 times a week.  She is improving with the spine but still needs primarily spine rehab and QUad core strengthening.  Right knee MRI reviewed shows lateral joint OA and s/p old previous menisectomy no new tear.  Left knee we know has meniscal tear and early OA. Ultimately she may need menisectomy but wouldn't rush till the spine is fully treated  and she could effectively move forward with PO knee PT.

## 2024-05-15 NOTE — PROCEDURE
[de-identified] : The left knee was prepped with alcohol and ethyl chloride was used to numb the skin. 1.5 cc of xylocaine 1% was placed for local anaesthesia. An injection of Durolane (60mg/3ml) was then given without complication into the joint. Patient instructed that there may be an inflammatory flare for 24 hrs , to use ice or advil if needed.  Lot umber [id number] EXP:08/31/2026    The right knee was prepped with alcohol and ethyl chloride was used to numb the skin. 1.5 cc of xylocaine 1% was placed for local anaesthesia. An injection of Durolane (60 mg/3 ml) was then given without complication into the joint. Patient instructed that there may be an inflammatory flare for 24 hrs , to use ice or advil if needed.   Lot umber [id number] EXP:08/31/2026

## 2024-05-15 NOTE — DISCUSSION/SUMMARY
[de-identified] : Se above note.  will continue PT for L/S spine and leg PRE  Bilateral HA injections to knees today for the OA.  F/U 2 months to re-evaluate.

## 2024-05-15 NOTE — PHYSICAL EXAM
[de-identified] : MRI right knee evaluated by me personally shows no new meniscal tear but positive lateral joint OA and evidence of previous partial meniscectomy.

## 2024-05-28 ENCOUNTER — APPOINTMENT (OUTPATIENT)
Dept: ORTHOPEDIC SURGERY | Facility: CLINIC | Age: 54
End: 2024-05-28

## 2024-05-28 DIAGNOSIS — S80.11XA CONTUSION OF RIGHT KNEE, INITIAL ENCOUNTER: ICD-10-CM

## 2024-05-28 DIAGNOSIS — S80.02XD CONTUSION OF LEFT KNEE, SUBSEQUENT ENCOUNTER: ICD-10-CM

## 2024-05-28 DIAGNOSIS — S80.01XA CONTUSION OF RIGHT KNEE, INITIAL ENCOUNTER: ICD-10-CM

## 2024-05-28 DIAGNOSIS — M23.301 OTHER MENISCUS DERANGEMENTS, UNSPECIFIED LATERAL MENISCUS, LEFT KNEE: ICD-10-CM

## 2024-05-28 DIAGNOSIS — S83.271D COMPLEX TEAR OF LATERAL MENISCUS, CURRENT INJURY, RIGHT KNEE, SUBSEQUENT ENCOUNTER: ICD-10-CM

## 2024-05-28 PROCEDURE — 99213 OFFICE O/P EST LOW 20 MIN: CPT

## 2024-05-28 NOTE — HISTORY OF PRESENT ILLNESS
[de-identified] : 52 y/o F 13 days  s/p B/L knee HA injections presents c/o increased pain initially post injection. She reports increased pain with weight bearing , and swelling in her knees. The symptoms are abaiting at this point. SHe is currently in PT for both knees. She describes he pain as being radicular in nature radiating to foot. Of note, Pt. has active symptoms from   known lumbar radiculopathy.  [3] : a current pain level of 3/10

## 2024-05-28 NOTE — PHYSICAL EXAM
[de-identified] : Knee Exam: Ecchymoses lateral No Edema No tissue induration No joint effusion ROM 0-125 degrees Moderated lateral jointline TTP Left knee

## 2024-05-28 NOTE — DISCUSSION/SUMMARY
[de-identified] : Impression: 54 y/o F with B/L knee OA and likely post injection inflammation now resolving.  Pt may also be experiencing concomitant lumbar radiculopathy confounding her source of her symptoms.  Plan: Now that symptoms are abating she will proceed with knee PT using spine precautions. She  will follow-up in 6 wks as scheduled.

## 2024-06-17 ENCOUNTER — APPOINTMENT (OUTPATIENT)
Dept: ORTHOPEDIC SURGERY | Facility: CLINIC | Age: 54
End: 2024-06-17
Payer: MEDICARE

## 2024-06-17 DIAGNOSIS — S93.492A SPRAIN OF OTHER LIGAMENT OF LEFT ANKLE, INITIAL ENCOUNTER: ICD-10-CM

## 2024-06-17 PROCEDURE — 99213 OFFICE O/P EST LOW 20 MIN: CPT

## 2024-06-17 NOTE — DISCUSSION/SUMMARY
[Medication Risks Reviewed] : Medication risks reviewed [de-identified] : Patient seen and examined.  Patient presents today for evaluation for acute on chronic exacerbation of left ankle and foot pain.  Based on her history, physical examination, imaging I discussed with her that her symptoms are suggestive of a low ankle sprain with associated midfoot arthritic flare.   I Discussed with patient the pathomechanics and pathophysiology of this condition.  Discussed with patient that I utilize a ladder analogy when determining treatment plans for ankle sprains.  Lowest rung on the ladder and easiest thing to do involves a trial of cam boot immobilization for 10 to 14 days, anti-inflammatories in the form of Mobic (a prescription was provided), weightbearing as tolerated, compression sock (information provided), and a plantar fascia night splint (information provided).  We discussed the risks and benefits associated with nonsteroidal anti-inflammatories.  Those risks include bleeding, kidney, GI issues.  Patient was instructed to take the medications with food.  And she was instructed to discontinue them should any abdominal or stomach discomfort occur.  I will see her back at 2 weeks time.  At that point we will reevaluate her degree of instability and initiate a functional rehabilitation protocol.  She will continue to maintain sleeping in the night splint for approximately 6 weeks in total.  Additionally she will begin on range of motion, edema control no passive or active inversion is to occur for at least 6 weeks.  Should her symptoms fail to improve or worsen by 6 weeks we will consider getting an advanced imaging in the form of MRI to ensure that we are not missing any other concurrent soft tissue pathology or chondral injuries that could be contributing to her symptomatology.  I am optimistic based on her history and physical examination that she will be able to heal this without requiring surgical fixation.  In the event that surgery is needed we discussed that appropriate surgical treatment would be based upon MRI findings, which would include either labral repair versus reconstruction and potentially arthroscopic surgery, limited versus extensive debridement and addressing any chondral lesions that are present and/or any other associated pathology.  All questions were asked and answered.  Patient is in agreement with the plan.  She will follow-up accordingly.  Patient has symptomatic midfoot arthrosis that I believe will calm down with the nonsteroidal anti-inflammatories and cam boot immobilization.  However we did discuss that subsequent treatment would involve stiff rigid soled shoes with a carbon fiber insert to help control the motion in addition to the medication.  Should her symptoms fail to improve or worsen we can consider low-dose steroid injections either into the ankle or into the midfoot to help alleviate symptoms.  The patient's current medication management of their orthopedic diagnosis was reviewed today:  (1) We discussed a comprehensive treatment plan that included possible pharmaceutical management involving the use of prescription strength medications including but not limited to options such as oral Naprosyn 500mg BID, once daily Meloxicam 15 mg, or 500-650 mg Tylenol versus over the counter oral medications and topical prescription NSAID Pennsaid vs over the counter Voltaren gel.  (2) There is a moderate risk of morbidity with further treatment, especially from use of prescription strength medications and possible side effects of these medications which include upset stomach with oral medications, skin reactions to topical medications and cardiac/renal issues with long term use.  (3) I recommended that the patient follow-up with their medical physician to discuss any significant specific potential issues with long term medication use such as interactions with current medications or with exacerbation of underlying medical comorbidities.  (4) The benefits and risks associated with use of injectable, oral or topical, prescription and over the counter anti-inflammatory medications were discussed with the patient. The patient voiced understanding of the risks including but not limited to bleeding, stroke, kidney dysfunction, heart disease, and were referred to the black box warning label for further information.    I spent >45 minutes seeing, evaluating, and documenting this encounter with >50% of that time spent discussing the treatment options and plan.  Plan for next visit: initiate PT, provide ASO. Consider CSI if failing to improve. *** Seen and examined.  Patient presents today for follow-up evaluation of left traumatic ankle pain.  Based on the history, physical examination, imaging I discussed with her that her symptoms are suggestive of a symptomatic low ankle sprain.  She is progressing well with the cam boot and compression sock and Mobic as her symptoms are improving and the swelling is improving.  Today we discussed at length treatment options for this which the next course of treatment would be transitioning to an ASO and performing functional ankle rehabilitation.  Prescription for physical therapy was given to her.  No active or passive inversion for another 3 weeks.  Additionally we discussed the importance of utilizing a plantar fascia night splint at night to help control ankle position.  I will see her back in approximately 4 weeks time to assess her symptoms.  Should her symptoms fail to improve or worsen we may consider an MRI and potentially a low-dose CSI injection to help get her over the hump of her recovery.  Plan for next visit: SS pain and tenderness along peroneal tendons as well as ATFL CFL.  Consider MRI versus CSI.   I spent >45 minutes seeing, evaluating, and documenting this encounter with >50% of that time spent discussing the treatment options and plan. ******** 11/13 Patient seen and examined.  Patient presents today following up for left ankle sprain.  Based on history, physical examination, imaging I discussed with her that she is making significant improvement in her ankle stability.  On examination there was no significant tenderness along peroneal tendons as well as at the ATFL CFL and ankle felt stable to anterior drawer.  I discussed with her at this juncture I would like her to continue with physical therapy and the inversion restriction is been removed.  I told her the mainstay of full recovery is performing functional ankle rehabilitation.  I instructed her to take the physical therapy prescription and the protocol that we gave her last time and give that to her therapist for what we want her to focus on.  I want her to continue to wear the ASO for another 6 weeks and to continue to use the night splint for another 6 weeks.  I will plan to see her back in approximately 4 weeks time.  Should her symptoms fail to improve or worsen we can consider a one-time low-dose steroid injection.  However I am optimistic that she will continue to improve without that being necessary.  All questions were asked and answered.  Patient in agreement with the plan.  Patient will plan to follow-up accordingly.  Plan for next visit: 1.  Assess drawer, assess peroneal tendon tenderness, assess joint line pain consider CSI *** L ankle low ankle sprain stable, lachman, mild ttp AL gutter, mild synovitis Recommend continued functional ankle PT Recommend/encourage HEP to strengthen ankle RTC 6 weeks if shes improving continue strengthening  Pt has a court case ,  Dr. Schroeder bc of her fall.    next visit: if not getting better will consider MR and consider L dose steroid injection ***** L ankle low ankle sprain stable, lachman, mild ttp AL gutter, mild synovitis, TTP along peroneal tendons Recommend MR L ankle Recommend/encourage HEP to strengthen ankle RTC 2 weeks after MR  Pt has a court case ,  Dr. Schroeder bc of her fall.    next visit: MR review, consider CSI

## 2024-06-17 NOTE — PHYSICAL EXAM
[Left] : left foot and ankle [Mild] : mild diffused ankle swelling [NL (40)] : plantar flexion 40 degrees [NL 30)] : inversion 30 degrees [NL (20)] : eversion 20 degrees [5___] : eversion 5[unfilled]/5 [] : no difficulty with single heel rise [FreeTextEntry9] : ankle ROM limited secondary to pain [de-identified] : bcr [TWNoteComboBox7] : dorsiflexion 0 degrees

## 2024-06-17 NOTE — HISTORY OF PRESENT ILLNESS
[de-identified] : Ms. SCOTT SCHMIDT is a very pleasant 53 year old female right hand dominant, who presents today for an evaluation of bilateral foot pain. Date of Injury/Onset: 09/03/2023 Pain: At Rest: 4/10 With Activity: 7/10 Mechanism of injury: Patient was at a wedding ceremony when she fell on a wet floor This is not a Work Related Injury being treated under Worker's Compensation. This is not an athletic injury occurring associated with an interscholastic or organized sports team. Quality of symptoms: Throbbing, sharp, shooting Improves with: None Worse with: When standing and walking Prior treatment: Occupational therapy, physical therapy, boot Prior Imaging: MRIs, X-rays done at [facility] Reports Available For Review Today: MRIs and X-rays done and X-ray done today @ [facility] Out of work/sport: Patient is disabled School/Sport/Position/Occupation: N/A Personal goal: Additional Information: [None]  Patient seen and examined. Patient is presenting today with acute on chronic exacerbation of left ankle and foot pain. Patient states she has had a prior ankle sprain and was at a wedding last month and had another fall sustaining further trauma to the ankle. She states she was brought to the emergency room at [facility] x-rays were performed as well as an MRI of the ankle. She was placed in a cam boot and instructed to be weightbearing as tolerated. She denies being seen or evaluated by any other doctors or had any formal treatment, therapy, injections or surgery to address this issue since she left the emergency room. She denies any fevers chills night sweats. Denies numbness or tingling left lower extremity. She is here for further evaluation and management. *** The patient is a 53 year old right hand dominant female who presents today for follow up evaluation of left foot pain and ankle pain. She says  Patient seen and examined.  Patient presents today following up for left ankle pain./Ankle sprain.  She has been compliant with protected weightbearing in cam boot immobilization.  She has been using it at night because she is been unable to obtain the plantar fascia night splint.  She been compliant taking the medication states that it helps.  She states her swelling is much improved.  Her symptoms are improving however they are still present.  She denies any interval trauma or injury.  Denies any numbness or tingling left lower extremity.  She is here for routine follow-up evaluation management.   *** 11/13 Patient seen and examined. Patient presents today following up for left ankle Sprain.  Patient states she was compliant with physical therapy she has been compliant wearing the ASO.  She has been compliant wearing the plantar fascia night splint at night.  She states her ankle swelling is also much improved with respect to the compression sock.  Her symptoms are improving however she still endorses occasional soreness/pain however she is significantly improved since our first evaluation.  She does endorse having worsening knee pain that she is currently being treated by doctors as a lot of as well as back pain that is being treated by a pain management physician.  Additionally her health has been compromised recently with a respiratory infection that she is currently on antibiotics and steroids for.  She denies any interval trauma or injury.  Denies fevers chills night sweats today.  She is here for further evaluation management.  **** 12/1/23 Patient seen and examined. Patient presents today following up for left ankle sprain.  05/06/2024 CRYSTAL 53 year F is here to follow up on L ankle, s/p fall 9/3/23 where she injured her ankles, knees and her back. Patient states some mild improvement since last visit, however still has persistent shooting pains in B/L feet. Pt states she does not have the balance yet, patient had been compliant with ASO and cane or walker. Patient reports she is starting PT, however is starting her with back strengthening prior to addressing ankle. Of note, pt reports new onset decreased sensation in bilateral feet while biking in PT. Pt takes meloxicam every few days for pain.   06/17/2024 CRYSTAL 54 year F is here to follow up on L ankle Patient states continued pain, swelling, and instability L>R since last visit. No new injury since last visit. Patient has tried physical therapy and HEP with little improvement. Still describing pain in back / knees/ankles. R has resolved. Pain in the left is lateral gutter, anterior ankle, peroneal tendons

## 2024-07-01 ENCOUNTER — EMERGENCY (EMERGENCY)
Facility: HOSPITAL | Age: 54
LOS: 1 days | Discharge: HOME | End: 2024-07-01
Attending: EMERGENCY MEDICINE | Admitting: EMERGENCY MEDICINE
Payer: MEDICARE

## 2024-07-01 VITALS
DIASTOLIC BLOOD PRESSURE: 59 MMHG | OXYGEN SATURATION: 100 % | HEART RATE: 59 BPM | SYSTOLIC BLOOD PRESSURE: 142 MMHG | TEMPERATURE: 98 F | RESPIRATION RATE: 18 BRPM

## 2024-07-01 VITALS
TEMPERATURE: 98 F | SYSTOLIC BLOOD PRESSURE: 144 MMHG | DIASTOLIC BLOOD PRESSURE: 79 MMHG | HEART RATE: 78 BPM | OXYGEN SATURATION: 99 % | RESPIRATION RATE: 18 BRPM

## 2024-07-01 DIAGNOSIS — R07.89 OTHER CHEST PAIN: ICD-10-CM

## 2024-07-01 DIAGNOSIS — Z96.652 PRESENCE OF LEFT ARTIFICIAL KNEE JOINT: ICD-10-CM

## 2024-07-01 DIAGNOSIS — Z88.2 ALLERGY STATUS TO SULFONAMIDES: ICD-10-CM

## 2024-07-01 DIAGNOSIS — Z98.89 OTHER SPECIFIED POSTPROCEDURAL STATES: Chronic | ICD-10-CM

## 2024-07-01 DIAGNOSIS — Z88.8 ALLERGY STATUS TO OTHER DRUGS, MEDICAMENTS AND BIOLOGICAL SUBSTANCES: ICD-10-CM

## 2024-07-01 DIAGNOSIS — Z87.39 PERSONAL HISTORY OF OTHER DISEASES OF THE MUSCULOSKELETAL SYSTEM AND CONNECTIVE TISSUE: ICD-10-CM

## 2024-07-01 DIAGNOSIS — K21.9 GASTRO-ESOPHAGEAL REFLUX DISEASE WITHOUT ESOPHAGITIS: ICD-10-CM

## 2024-07-01 DIAGNOSIS — Z88.1 ALLERGY STATUS TO OTHER ANTIBIOTIC AGENTS STATUS: ICD-10-CM

## 2024-07-01 DIAGNOSIS — R25.2 CRAMP AND SPASM: ICD-10-CM

## 2024-07-01 DIAGNOSIS — Z88.5 ALLERGY STATUS TO NARCOTIC AGENT: ICD-10-CM

## 2024-07-01 LAB
ALBUMIN SERPL ELPH-MCNC: 3.9 G/DL — SIGNIFICANT CHANGE UP (ref 3.3–5)
ALP SERPL-CCNC: 98 U/L — SIGNIFICANT CHANGE UP (ref 40–120)
ALT FLD-CCNC: 11 U/L — SIGNIFICANT CHANGE UP (ref 10–45)
ANION GAP SERPL CALC-SCNC: 12 MMOL/L — SIGNIFICANT CHANGE UP (ref 5–17)
AST SERPL-CCNC: 14 U/L — SIGNIFICANT CHANGE UP (ref 10–40)
BASOPHILS # BLD AUTO: 0.05 K/UL — SIGNIFICANT CHANGE UP (ref 0–0.2)
BASOPHILS NFR BLD AUTO: 0.5 % — SIGNIFICANT CHANGE UP (ref 0–2)
BILIRUB SERPL-MCNC: 0.4 MG/DL — SIGNIFICANT CHANGE UP (ref 0.2–1.2)
BUN SERPL-MCNC: 8 MG/DL — SIGNIFICANT CHANGE UP (ref 7–23)
CALCIUM SERPL-MCNC: 9.9 MG/DL — SIGNIFICANT CHANGE UP (ref 8.4–10.5)
CHLORIDE SERPL-SCNC: 103 MMOL/L — SIGNIFICANT CHANGE UP (ref 96–108)
CO2 SERPL-SCNC: 26 MMOL/L — SIGNIFICANT CHANGE UP (ref 22–31)
CREAT SERPL-MCNC: 0.74 MG/DL — SIGNIFICANT CHANGE UP (ref 0.5–1.3)
EGFR: 95 ML/MIN/1.73M2 — SIGNIFICANT CHANGE UP
EOSINOPHIL # BLD AUTO: 0.07 K/UL — SIGNIFICANT CHANGE UP (ref 0–0.5)
EOSINOPHIL NFR BLD AUTO: 0.7 % — SIGNIFICANT CHANGE UP (ref 0–6)
GLUCOSE SERPL-MCNC: 79 MG/DL — SIGNIFICANT CHANGE UP (ref 70–99)
HCT VFR BLD CALC: 41.1 % — SIGNIFICANT CHANGE UP (ref 34.5–45)
HGB BLD-MCNC: 12.9 G/DL — SIGNIFICANT CHANGE UP (ref 11.5–15.5)
IMM GRANULOCYTES NFR BLD AUTO: 0.5 % — SIGNIFICANT CHANGE UP (ref 0–0.9)
LYMPHOCYTES # BLD AUTO: 2.19 K/UL — SIGNIFICANT CHANGE UP (ref 1–3.3)
LYMPHOCYTES # BLD AUTO: 20.6 % — SIGNIFICANT CHANGE UP (ref 13–44)
MAGNESIUM SERPL-MCNC: 2.2 MG/DL — SIGNIFICANT CHANGE UP (ref 1.6–2.6)
MCHC RBC-ENTMCNC: 26.8 PG — LOW (ref 27–34)
MCHC RBC-ENTMCNC: 31.4 GM/DL — LOW (ref 32–36)
MCV RBC AUTO: 85.3 FL — SIGNIFICANT CHANGE UP (ref 80–100)
MONOCYTES # BLD AUTO: 0.89 K/UL — SIGNIFICANT CHANGE UP (ref 0–0.9)
MONOCYTES NFR BLD AUTO: 8.4 % — SIGNIFICANT CHANGE UP (ref 2–14)
NEUTROPHILS # BLD AUTO: 7.38 K/UL — SIGNIFICANT CHANGE UP (ref 1.8–7.4)
NEUTROPHILS NFR BLD AUTO: 69.3 % — SIGNIFICANT CHANGE UP (ref 43–77)
NRBC # BLD: 0 /100 WBCS — SIGNIFICANT CHANGE UP (ref 0–0)
NT-PROBNP SERPL-SCNC: 62 PG/ML — SIGNIFICANT CHANGE UP (ref 0–300)
PLATELET # BLD AUTO: 276 K/UL — SIGNIFICANT CHANGE UP (ref 150–400)
POTASSIUM SERPL-MCNC: 4.1 MMOL/L — SIGNIFICANT CHANGE UP (ref 3.5–5.3)
POTASSIUM SERPL-SCNC: 4.1 MMOL/L — SIGNIFICANT CHANGE UP (ref 3.5–5.3)
PROT SERPL-MCNC: 8.1 G/DL — SIGNIFICANT CHANGE UP (ref 6–8.3)
RBC # BLD: 4.82 M/UL — SIGNIFICANT CHANGE UP (ref 3.8–5.2)
RBC # FLD: 14.3 % — SIGNIFICANT CHANGE UP (ref 10.3–14.5)
SODIUM SERPL-SCNC: 141 MMOL/L — SIGNIFICANT CHANGE UP (ref 135–145)
TROPONIN T, HIGH SENSITIVITY RESULT: <6 NG/L — SIGNIFICANT CHANGE UP (ref 0–51)
WBC # BLD: 10.63 K/UL — HIGH (ref 3.8–10.5)
WBC # FLD AUTO: 10.63 K/UL — HIGH (ref 3.8–10.5)

## 2024-07-01 PROCEDURE — 71045 X-RAY EXAM CHEST 1 VIEW: CPT | Mod: 26

## 2024-07-01 PROCEDURE — 96374 THER/PROPH/DIAG INJ IV PUSH: CPT

## 2024-07-01 PROCEDURE — 84484 ASSAY OF TROPONIN QUANT: CPT

## 2024-07-01 PROCEDURE — 71045 X-RAY EXAM CHEST 1 VIEW: CPT

## 2024-07-01 PROCEDURE — 83735 ASSAY OF MAGNESIUM: CPT

## 2024-07-01 PROCEDURE — 93010 ELECTROCARDIOGRAM REPORT: CPT

## 2024-07-01 PROCEDURE — 83880 ASSAY OF NATRIURETIC PEPTIDE: CPT

## 2024-07-01 PROCEDURE — 80053 COMPREHEN METABOLIC PANEL: CPT

## 2024-07-01 PROCEDURE — 99285 EMERGENCY DEPT VISIT HI MDM: CPT

## 2024-07-01 PROCEDURE — 93005 ELECTROCARDIOGRAM TRACING: CPT | Mod: 76

## 2024-07-01 PROCEDURE — 36415 COLL VENOUS BLD VENIPUNCTURE: CPT

## 2024-07-01 PROCEDURE — 99285 EMERGENCY DEPT VISIT HI MDM: CPT | Mod: 25

## 2024-07-01 PROCEDURE — 85025 COMPLETE CBC W/AUTO DIFF WBC: CPT

## 2024-07-01 RX ORDER — DIAZEPAM 10 MG/1
5 TABLET ORAL ONCE
Refills: 0 | Status: DISCONTINUED | OUTPATIENT
Start: 2024-07-01 | End: 2024-07-01

## 2024-07-01 RX ORDER — KETOROLAC TROMETHAMINE 30 MG/ML
15 INJECTION, SOLUTION INTRAMUSCULAR ONCE
Refills: 0 | Status: DISCONTINUED | OUTPATIENT
Start: 2024-07-01 | End: 2024-07-01

## 2024-07-01 RX ORDER — ACETAMINOPHEN 325 MG
650 TABLET ORAL ONCE
Refills: 0 | Status: COMPLETED | OUTPATIENT
Start: 2024-07-01 | End: 2024-07-01

## 2024-07-01 RX ORDER — SODIUM CHLORIDE 0.9 % (FLUSH) 0.9 %
1000 SYRINGE (ML) INJECTION ONCE
Refills: 0 | Status: COMPLETED | OUTPATIENT
Start: 2024-07-01 | End: 2024-07-01

## 2024-07-01 RX ADMIN — DIAZEPAM 5 MILLIGRAM(S): 10 TABLET ORAL at 15:08

## 2024-07-01 RX ADMIN — Medication 650 MILLIGRAM(S): at 18:15

## 2024-07-01 RX ADMIN — Medication 2000 MILLILITER(S): at 15:08

## 2024-07-01 RX ADMIN — KETOROLAC TROMETHAMINE 15 MILLIGRAM(S): 30 INJECTION, SOLUTION INTRAMUSCULAR at 15:08

## 2024-07-01 RX ADMIN — Medication 10 MILLIGRAM(S): at 18:15

## 2024-07-09 ENCOUNTER — APPOINTMENT (OUTPATIENT)
Dept: ORTHOPEDIC SURGERY | Facility: CLINIC | Age: 54
End: 2024-07-09

## 2024-07-11 ENCOUNTER — APPOINTMENT (OUTPATIENT)
Dept: ORTHOPEDIC SURGERY | Facility: CLINIC | Age: 54
End: 2024-07-11
Payer: MEDICARE

## 2024-07-11 DIAGNOSIS — S93.492A SPRAIN OF OTHER LIGAMENT OF LEFT ANKLE, INITIAL ENCOUNTER: ICD-10-CM

## 2024-07-11 PROCEDURE — 99213 OFFICE O/P EST LOW 20 MIN: CPT

## 2024-07-11 RX ORDER — MELOXICAM 7.5 MG/1
7.5 TABLET ORAL
Qty: 30 | Refills: 0 | Status: ACTIVE | COMMUNITY
Start: 2024-07-11 | End: 1900-01-01

## 2024-07-12 ENCOUNTER — APPOINTMENT (OUTPATIENT)
Dept: INTERNAL MEDICINE | Facility: CLINIC | Age: 54
End: 2024-07-12
Payer: MEDICARE

## 2024-07-12 VITALS
SYSTOLIC BLOOD PRESSURE: 108 MMHG | BODY MASS INDEX: 31.5 KG/M2 | TEMPERATURE: 97.7 F | HEART RATE: 76 BPM | OXYGEN SATURATION: 100 % | DIASTOLIC BLOOD PRESSURE: 70 MMHG | WEIGHT: 220 LBS | HEIGHT: 70 IN

## 2024-07-12 DIAGNOSIS — M54.9 DORSALGIA, UNSPECIFIED: ICD-10-CM

## 2024-07-12 DIAGNOSIS — M79.10 MYALGIA, UNSPECIFIED SITE: ICD-10-CM

## 2024-07-12 DIAGNOSIS — R91.1 SOLITARY PULMONARY NODULE: ICD-10-CM

## 2024-07-12 DIAGNOSIS — E78.5 HYPERLIPIDEMIA, UNSPECIFIED: ICD-10-CM

## 2024-07-12 DIAGNOSIS — M89.9 DISORDER OF BONE, UNSPECIFIED: ICD-10-CM

## 2024-07-12 PROCEDURE — 99215 OFFICE O/P EST HI 40 MIN: CPT

## 2024-07-12 PROCEDURE — G2212 PROLONG OUTPT/OFFICE VIS: CPT

## 2024-07-12 PROCEDURE — G2211 COMPLEX E/M VISIT ADD ON: CPT

## 2024-07-12 RX ORDER — TRAMADOL HYDROCHLORIDE 50 MG/1
50 TABLET, COATED ORAL
Refills: 0 | Status: ACTIVE | COMMUNITY
Start: 2024-07-12

## 2024-07-15 ENCOUNTER — APPOINTMENT (OUTPATIENT)
Dept: ORTHOPEDIC SURGERY | Facility: CLINIC | Age: 54
End: 2024-07-15
Payer: MEDICARE

## 2024-07-15 DIAGNOSIS — M17.0 BILATERAL PRIMARY OSTEOARTHRITIS OF KNEE: ICD-10-CM

## 2024-07-15 DIAGNOSIS — M23.301 OTHER MENISCUS DERANGEMENTS, UNSPECIFIED LATERAL MENISCUS, LEFT KNEE: ICD-10-CM

## 2024-07-15 PROBLEM — M79.10 MYALGIA: Status: ACTIVE | Noted: 2024-07-12

## 2024-07-15 PROCEDURE — 99213 OFFICE O/P EST LOW 20 MIN: CPT

## 2024-07-17 ENCOUNTER — APPOINTMENT (OUTPATIENT)
Dept: ORTHOPEDIC SURGERY | Facility: CLINIC | Age: 54
End: 2024-07-17
Payer: MEDICARE

## 2024-07-17 VITALS
WEIGHT: 220 LBS | OXYGEN SATURATION: 98 % | SYSTOLIC BLOOD PRESSURE: 113 MMHG | HEART RATE: 78 BPM | TEMPERATURE: 98.8 F | DIASTOLIC BLOOD PRESSURE: 72 MMHG | HEIGHT: 70 IN | BODY MASS INDEX: 31.5 KG/M2

## 2024-07-17 DIAGNOSIS — M51.36 OTHER INTERVERTEBRAL DISC DEGENERATION, LUMBAR REGION: ICD-10-CM

## 2024-07-17 DIAGNOSIS — G89.29 LOW BACK PAIN, UNSPECIFIED: ICD-10-CM

## 2024-07-17 DIAGNOSIS — M54.50 LOW BACK PAIN, UNSPECIFIED: ICD-10-CM

## 2024-07-17 PROCEDURE — 72083 X-RAY EXAM ENTIRE SPI 4/5 VW: CPT

## 2024-07-17 PROCEDURE — 99213 OFFICE O/P EST LOW 20 MIN: CPT

## 2024-08-14 ENCOUNTER — APPOINTMENT (OUTPATIENT)
Dept: INTERNAL MEDICINE | Facility: CLINIC | Age: 54
End: 2024-08-14

## 2024-08-16 ENCOUNTER — APPOINTMENT (OUTPATIENT)
Dept: INTERNAL MEDICINE | Facility: CLINIC | Age: 54
End: 2024-08-16
Payer: MEDICARE

## 2024-08-16 VITALS
TEMPERATURE: 97.7 F | SYSTOLIC BLOOD PRESSURE: 124 MMHG | DIASTOLIC BLOOD PRESSURE: 80 MMHG | OXYGEN SATURATION: 100 % | HEART RATE: 104 BPM | HEIGHT: 70 IN

## 2024-08-16 DIAGNOSIS — M54.16 RADICULOPATHY, LUMBAR REGION: ICD-10-CM

## 2024-08-16 DIAGNOSIS — E16.2 HYPOGLYCEMIA, UNSPECIFIED: ICD-10-CM

## 2024-08-16 DIAGNOSIS — R25.1 TREMOR, UNSPECIFIED: ICD-10-CM

## 2024-08-16 PROCEDURE — 99215 OFFICE O/P EST HI 40 MIN: CPT

## 2024-08-16 PROCEDURE — G2211 COMPLEX E/M VISIT ADD ON: CPT

## 2024-08-16 RX ORDER — BLOOD-GLUCOSE METER
EACH MISCELLANEOUS
Qty: 1 | Refills: 0 | Status: ACTIVE | COMMUNITY
Start: 2024-08-16 | End: 1900-01-01

## 2024-08-16 RX ORDER — BLOOD-GLUCOSE METER
70 EACH MISCELLANEOUS
Qty: 1 | Refills: 2 | Status: ACTIVE | COMMUNITY
Start: 2024-08-16 | End: 1900-01-01

## 2024-08-22 ENCOUNTER — APPOINTMENT (OUTPATIENT)
Dept: ORTHOPEDIC SURGERY | Facility: CLINIC | Age: 54
End: 2024-08-22
Payer: MEDICARE

## 2024-08-22 DIAGNOSIS — M19.072 PRIMARY OSTEOARTHRITIS, LEFT ANKLE AND FOOT: ICD-10-CM

## 2024-08-22 DIAGNOSIS — S93.492A SPRAIN OF OTHER LIGAMENT OF LEFT ANKLE, INITIAL ENCOUNTER: ICD-10-CM

## 2024-08-22 PROCEDURE — 99213 OFFICE O/P EST LOW 20 MIN: CPT

## 2024-08-22 NOTE — PHYSICAL EXAM
[Left] : left foot and ankle [Mild] : mild diffused ankle swelling [NL (40)] : plantar flexion 40 degrees [NL 30)] : inversion 30 degrees [NL (20)] : eversion 20 degrees [5___] : eversion 5[unfilled]/5 [] : no difficulty with single heel rise [FreeTextEntry9] : ankle ROM limited secondary to pain [de-identified] : bcr [TWNoteComboBox7] : dorsiflexion 0 degrees

## 2024-08-22 NOTE — HISTORY OF PRESENT ILLNESS
[] : Resident
[de-identified] : Ms. SCOTT SCHMIDT is a very pleasant 53 year old female right hand dominant, who presents today for an evaluation of bilateral foot pain. Date of Injury/Onset: 09/03/2023 Pain: At Rest: 4/10 With Activity: 7/10 Mechanism of injury: Patient was at a wedding ceremony when she fell on a wet floor This is not a Work Related Injury being treated under Worker's Compensation. This is not an athletic injury occurring associated with an interscholastic or organized sports team. Quality of symptoms: Throbbing, sharp, shooting Improves with: None Worse with: When standing and walking Prior treatment: Occupational therapy, physical therapy, boot Prior Imaging: MRIs, X-rays done at [facility] Reports Available For Review Today: MRIs and X-rays done and X-ray done today @ [facility] Out of work/sport: Patient is disabled School/Sport/Position/Occupation: N/A Personal goal: Additional Information: [None]  Patient seen and examined. Patient is presenting today with acute on chronic exacerbation of left ankle and foot pain. Patient states she has had a prior ankle sprain and was at a wedding last month and had another fall sustaining further trauma to the ankle. She states she was brought to the emergency room at [facility] x-rays were performed as well as an MRI of the ankle. She was placed in a cam boot and instructed to be weightbearing as tolerated. She denies being seen or evaluated by any other doctors or had any formal treatment, therapy, injections or surgery to address this issue since she left the emergency room. She denies any fevers chills night sweats. Denies numbness or tingling left lower extremity. She is here for further evaluation and management. *** The patient is a 53 year old right hand dominant female who presents today for follow up evaluation of left foot pain and ankle pain. She says  Patient seen and examined.  Patient presents today following up for left ankle pain./Ankle sprain.  She has been compliant with protected weightbearing in cam boot immobilization.  She has been using it at night because she is been unable to obtain the plantar fascia night splint.  She been compliant taking the medication states that it helps.  She states her swelling is much improved.  Her symptoms are improving however they are still present.  She denies any interval trauma or injury.  Denies any numbness or tingling left lower extremity.  She is here for routine follow-up evaluation management.   *** 11/13 Patient seen and examined. Patient presents today following up for left ankle Sprain.  Patient states she was compliant with physical therapy she has been compliant wearing the ASO.  She has been compliant wearing the plantar fascia night splint at night.  She states her ankle swelling is also much improved with respect to the compression sock.  Her symptoms are improving however she still endorses occasional soreness/pain however she is significantly improved since our first evaluation.  She does endorse having worsening knee pain that she is currently being treated by doctors as a lot of as well as back pain that is being treated by a pain management physician.  Additionally her health has been compromised recently with a respiratory infection that she is currently on antibiotics and steroids for.  She denies any interval trauma or injury.  Denies fevers chills night sweats today.  She is here for further evaluation management.  **** 12/1/23 Patient seen and examined. Patient presents today following up for left ankle sprain.  05/06/2024 CRYSTAL 53 year F is here to follow up on L ankle, s/p fall 9/3/23 where she injured her ankles, knees and her back. Patient states some mild improvement since last visit, however still has persistent shooting pains in B/L feet. Pt states she does not have the balance yet, patient had been compliant with ASO and cane or walker. Patient reports she is starting PT, however is starting her with back strengthening prior to addressing ankle. Of note, pt reports new onset decreased sensation in bilateral feet while biking in PT. Pt takes meloxicam every few days for pain.   06/17/2024 CRYSTAL 54 year F is here to follow up on L ankle Patient states continued pain, swelling, and instability L>R since last visit. No new injury since last visit. Patient has tried physical therapy and HEP with little improvement. Still describing pain in back / knees/ankles. R has resolved. Pain in the left is lateral gutter, anterior ankle, peroneal tendons   07/11/2024 SCOTT 54 year F is here today to follow up on MRI results of left ankle. Patient reports mild improvement with pain since last visit. Patient is doing PT. Patient had been compliant with ankle braces. Denies trauma injury accident.   08/22/2024 CRYSTAL 54 year F is here today to follow up on b/l ankles. Patient reports some mild improvement with pain, reports some intermittent sharp pain on left ankle. patient had been compliant with ASO braces, patient is doing PT and HEP.

## 2024-08-22 NOTE — DATA REVIEWED
[FreeTextEntry1] : MR L ankle synovitis, peroneal tendinitis, chronically torn atfl  No significant OCD/OLT

## 2024-08-22 NOTE — DISCUSSION/SUMMARY
[Medication Risks Reviewed] : Medication risks reviewed [de-identified] : Patient seen and examined.  Patient presents today for evaluation for acute on chronic exacerbation of left ankle and foot pain.  Based on her history, physical examination, imaging I discussed with her that her symptoms are suggestive of a low ankle sprain with associated midfoot arthritic flare.   I Discussed with patient the pathomechanics and pathophysiology of this condition.  Discussed with patient that I utilize a ladder analogy when determining treatment plans for ankle sprains.  Lowest rung on the ladder and easiest thing to do involves a trial of cam boot immobilization for 10 to 14 days, anti-inflammatories in the form of Mobic (a prescription was provided), weightbearing as tolerated, compression sock (information provided), and a plantar fascia night splint (information provided).  We discussed the risks and benefits associated with nonsteroidal anti-inflammatories.  Those risks include bleeding, kidney, GI issues.  Patient was instructed to take the medications with food.  And she was instructed to discontinue them should any abdominal or stomach discomfort occur.  I will see her back at 2 weeks time.  At that point we will reevaluate her degree of instability and initiate a functional rehabilitation protocol.  She will continue to maintain sleeping in the night splint for approximately 6 weeks in total.  Additionally she will begin on range of motion, edema control no passive or active inversion is to occur for at least 6 weeks.  Should her symptoms fail to improve or worsen by 6 weeks we will consider getting an advanced imaging in the form of MRI to ensure that we are not missing any other concurrent soft tissue pathology or chondral injuries that could be contributing to her symptomatology.  I am optimistic based on her history and physical examination that she will be able to heal this without requiring surgical fixation.  In the event that surgery is needed we discussed that appropriate surgical treatment would be based upon MRI findings, which would include either labral repair versus reconstruction and potentially arthroscopic surgery, limited versus extensive debridement and addressing any chondral lesions that are present and/or any other associated pathology.  All questions were asked and answered.  Patient is in agreement with the plan.  She will follow-up accordingly.  Patient has symptomatic midfoot arthrosis that I believe will calm down with the nonsteroidal anti-inflammatories and cam boot immobilization.  However we did discuss that subsequent treatment would involve stiff rigid soled shoes with a carbon fiber insert to help control the motion in addition to the medication.  Should her symptoms fail to improve or worsen we can consider low-dose steroid injections either into the ankle or into the midfoot to help alleviate symptoms.  The patient's current medication management of their orthopedic diagnosis was reviewed today:  (1) We discussed a comprehensive treatment plan that included possible pharmaceutical management involving the use of prescription strength medications including but not limited to options such as oral Naprosyn 500mg BID, once daily Meloxicam 15 mg, or 500-650 mg Tylenol versus over the counter oral medications and topical prescription NSAID Pennsaid vs over the counter Voltaren gel.  (2) There is a moderate risk of morbidity with further treatment, especially from use of prescription strength medications and possible side effects of these medications which include upset stomach with oral medications, skin reactions to topical medications and cardiac/renal issues with long term use.  (3) I recommended that the patient follow-up with their medical physician to discuss any significant specific potential issues with long term medication use such as interactions with current medications or with exacerbation of underlying medical comorbidities.  (4) The benefits and risks associated with use of injectable, oral or topical, prescription and over the counter anti-inflammatory medications were discussed with the patient. The patient voiced understanding of the risks including but not limited to bleeding, stroke, kidney dysfunction, heart disease, and were referred to the black box warning label for further information.    I spent >45 minutes seeing, evaluating, and documenting this encounter with >50% of that time spent discussing the treatment options and plan.  Plan for next visit: initiate PT, provide ASO. Consider CSI if failing to improve. *** Seen and examined.  Patient presents today for follow-up evaluation of left traumatic ankle pain.  Based on the history, physical examination, imaging I discussed with her that her symptoms are suggestive of a symptomatic low ankle sprain.  She is progressing well with the cam boot and compression sock and Mobic as her symptoms are improving and the swelling is improving.  Today we discussed at length treatment options for this which the next course of treatment would be transitioning to an ASO and performing functional ankle rehabilitation.  Prescription for physical therapy was given to her.  No active or passive inversion for another 3 weeks.  Additionally we discussed the importance of utilizing a plantar fascia night splint at night to help control ankle position.  I will see her back in approximately 4 weeks time to assess her symptoms.  Should her symptoms fail to improve or worsen we may consider an MRI and potentially a low-dose CSI injection to help get her over the hump of her recovery.  Plan for next visit: SS pain and tenderness along peroneal tendons as well as ATFL CFL.  Consider MRI versus CSI.   I spent >45 minutes seeing, evaluating, and documenting this encounter with >50% of that time spent discussing the treatment options and plan. ******** 11/13 Patient seen and examined.  Patient presents today following up for left ankle sprain.  Based on history, physical examination, imaging I discussed with her that she is making significant improvement in her ankle stability.  On examination there was no significant tenderness along peroneal tendons as well as at the ATFL CFL and ankle felt stable to anterior drawer.  I discussed with her at this juncture I would like her to continue with physical therapy and the inversion restriction is been removed.  I told her the mainstay of full recovery is performing functional ankle rehabilitation.  I instructed her to take the physical therapy prescription and the protocol that we gave her last time and give that to her therapist for what we want her to focus on.  I want her to continue to wear the ASO for another 6 weeks and to continue to use the night splint for another 6 weeks.  I will plan to see her back in approximately 4 weeks time.  Should her symptoms fail to improve or worsen we can consider a one-time low-dose steroid injection.  However I am optimistic that she will continue to improve without that being necessary.  All questions were asked and answered.  Patient in agreement with the plan.  Patient will plan to follow-up accordingly.  Plan for next visit: 1.  Assess drawer, assess peroneal tendon tenderness, assess joint line pain consider CSI *** L ankle low ankle sprain stable, lachman, mild ttp AL gutter, mild synovitis Recommend continued functional ankle PT Recommend/encourage HEP to strengthen ankle RTC 6 weeks if shes improving continue strengthening  Pt has a court case ,  Dr. Schroeder bc of her fall.    next visit: if not getting better will consider MR and consider L dose steroid injection ***** L ankle low ankle sprain stable, lachman, mild ttp AL gutter, mild synovitis, TTP along peroneal tendons Recommend MR L ankle Recommend/encourage HEP to strengthen ankle RTC 2 weeks after MR  Pt has a court case ,  Dr. Schroeder bc of her fall.    next visit: MR review, consider CSI  **** L ankle low ankle sprain/synovitis stable, lachman, mild ttp AL gutter, mild synovitis, TTP along peroneal tendons MR L ankle synovitis, peroneal tendinitis, chronically torn atfl  No significant OCD/OLT Recommend/encourage HEP to strengthen ankle CSI offered today but pt not interested , will reassess in 6 weeks RTC 6 weeks after MR Pt has a court case ,  Dr. Schroeder bc of her fall.    next visit: consider CSI next visit *** 8/22 L ankle low ankle sprain/synovitis stable, lachman, mild ttp AL gutter, mild synovitis, no TTP along peroneal tendons MR L ankle synovitis, peroneal tendinitis, chronically torn atfl  No significant OCD/OLT Recommend/encourage HEP to strengthen ankle CSI offered today but pt not interested , will reassess in 6 weeks RTC 6 weeks  Pt has a court case ,  Dr. Schroeder bc of her fall.    next visit: consider CSI next visit

## 2024-08-22 NOTE — ASSESSMENT
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

## 2024-09-03 NOTE — ED PROVIDER NOTE - NS_EDPROVIDERDISPOUSERTYPE_ED_A_ED
Is This A New Presentation, Or A Follow-Up?: Nail Disorder How Severe Is It?: moderate Attending Attestation (For Attendings USE Only)...

## 2024-10-02 ENCOUNTER — APPOINTMENT (OUTPATIENT)
Dept: ENDOCRINOLOGY | Facility: CLINIC | Age: 54
End: 2024-10-02

## 2024-10-02 ENCOUNTER — APPOINTMENT (OUTPATIENT)
Dept: ORTHOPEDIC SURGERY | Facility: CLINIC | Age: 54
End: 2024-10-02
Payer: MEDICARE

## 2024-10-02 VITALS
WEIGHT: 215 LBS | BODY MASS INDEX: 30.85 KG/M2 | HEART RATE: 72 BPM | SYSTOLIC BLOOD PRESSURE: 130 MMHG | DIASTOLIC BLOOD PRESSURE: 66 MMHG

## 2024-10-02 DIAGNOSIS — Z23 ENCOUNTER FOR IMMUNIZATION: ICD-10-CM

## 2024-10-02 DIAGNOSIS — M51.369: ICD-10-CM

## 2024-10-02 PROCEDURE — 99213 OFFICE O/P EST LOW 20 MIN: CPT

## 2024-10-02 PROCEDURE — 90656 IIV3 VACC NO PRSV 0.5 ML IM: CPT

## 2024-10-02 PROCEDURE — 99204 OFFICE O/P NEW MOD 45 MIN: CPT | Mod: 25

## 2024-10-02 PROCEDURE — G0008: CPT

## 2024-10-02 NOTE — DISCUSSION/SUMMARY
[de-identified] : L5/S1 disc degeneration with modic changes; cervical radiculopathy I have discussed my findings with the patient.  She will continue with her current program given her improvement.  If this is not effective for the lumbar, I have recommended intercept procedure with Dr. Chuy Caban.  Referrals given to the patient at last visit.  She should follow up with me if the pain management and intercept is unsuccessful.  we discussed if this were to fail she would be a candidate for a L5/S1 fusion.  All questions answered.

## 2024-10-02 NOTE — HISTORY OF PRESENT ILLNESS
[FreeTextEntry1] : Small thyroid nodules found during workup for choking on her saliva. no FH of thyroid disease  Esophogram was normal (1/24). No hiatal hernia.  Foreign body sensation experienced following barium pill. Sonogram reports reviewed from 2/24 and 5/23:  heterogeneous gland, bilateral small nodules and cysts. She had anaphylactic reaction in March, after taking sumatriptan pill. She had hypoglycemia episode during PT.  She had not eaten breakfast that morning, but she does that frequently.  Then during PT< she had tremor, then vertigo when she was in the hospital.  Glucose was in the 80s, but this could have been after glucose was administered.  PCP rx'd her glucometer and she has been testing once/day at home.  Morning sugars 83-95.   Max 160, after meal. In the last 6 months, she has lost 30 lb due to eating healthier diet (less fried and fatty foods) and eating smaller portions. She had an identical twin sister who passed 2 years ago from PE. labs reviewed from 5/24: TSH 1.07, tot chol 236, trig 67, HDL 71, , 25D 29.6.  Lipids to be repeated next week at [facility].  PMH:  tremor, vertigo orthostatic hypotension, vasovagal reaction hives lap hysterectomy 11/2010, myomectomy 2002 cervical radiculopathy  Meds Zyrtec, famotidine, fexofenadine, Xolair cyclobenzaprine TID, meloxicam prn,  diclofenac prn. recently weaned off gabapentin.

## 2024-10-02 NOTE — PHYSICAL EXAM
[de-identified] : Physical Exam:\par  \par  General: patient is well developed, well nourished, in no acute \par  distress, alert and oriented x 3. \par  \par  Mood and affect: normal\par  \par  Respiratory: no respiratory distress noted\par  \par  Skin: no scars over spine, skin intact, no erythema, increased warmth\par  \par  Alignment:The spine is well compensated in the coronal and sagittal plane.\par  \par  Gait: Slow with walker\par  \par  Palpation: no tenderness to palpation midline along spine or paraspinal region\par  \par  Range of motion: Cervical and Lumbar spine ROM is restricted\par  \par  Neurologic Exam:\par  Motor: Manual Muscle testing in the upper and lower extremities is 5 out of 5 in all muscle groups. There is no evidence of muscular atrophy in the upper extremities. Sensory: Sensation to light touch is grossly intact in the upper and lower extremities\par  \par  Reflexes: DTR are present and symmetric throughout, negative toscano bilaterally, negative inverted radial reflex bilaterally, no clonus, plantar responses are flexor\par  \par  Special tests: Spurlings sign absent. Lhermitte's sign is absent. Straight Leg Raise Negative, Cross Straight Leg Raise Negative, VENITA Test Negative\par  \par  Hip Exam: Full painless ROM of bilateral hips\par  \par  Vascular: Examination of the peripheral vascular system demonstrates no evidence of congestion or edema. no lymphedema bilateral lower extremities, pulses are present and symmetric in both lower extremities. [de-identified] : MRI Lumbar 10/22/2022 my read: L5/S1 disc degeneration moderate with modic changes.  No significant neurocompressive lesion.  There is evidence of facet hypertrophy in the lumbar region.\par  \par  MRI cervical 10/2021: C3/C4 small left sided disc herniation and facet artrosis with severe left and moderate right foramianl stenosis; no cord compression or cord signal change; C4/5 moderate bilateral foraminal steonsis; C5/6: no significant stenosis; C6/7: left disc osteophyte with some compression of the left c7 root.\par  \par  XR AP/lateral cervical 9/2021: mild multilevel degenerative changes, no spondylolisthesis, no fractures seen.

## 2024-10-02 NOTE — DATA REVIEWED
[FreeTextEntry1] : 5/24  TSH 1.07, tot chol 236, trig 67, HDL 71, , 25D 29.6  thyroid sono, 2/24: heterogeneous.  normal sized gland. R upper nodule 10 x 6 x 7mm.  L upper nodule 2mm, hypoechoic  R lower cystic lesion 5mm  thyroid sono, 5/23 ([facility], seen on pt phone) R lobe 44 x 14 x 22m, L lobe 43 x 10 x 17mm. scattered benign colloid cysts and spongiform nodules.  diffusely heterogeneous gland.

## 2024-10-02 NOTE — ASSESSMENT
[FreeTextEntry1] : Sonogram c/w Hashimoto's.  Euthyroid. Small nodules and cysts do not need biopsy at this time.   Neck symptoms (choking feeling, dysphagia) are not due to her thyroid (her gland is not enlarged). [facility] form given to check thyroid antibodies and TSH.  As long as TSH (thyroid function) is normal, no medication is needed, however high titer of thyroid antibodies increases risk of hypothyroidism.  Symptoms of hypothyroidism reviewed -- fatigue, weight gain, constipation, cold intolerance. Recommend yearly monitoring of TSH, which can be done with PCP and she can return to me if TSH becomes abnormal.  ? hypoglycemia.  I think she wasn't really hypoglycemic.  (tremor mistaken for hypoglycemia?) Obesity BMI 30 I recommended she eat mixed meals--avoid high carb/simple carbs foods.  She can stop testing glucose at home. continue lifestyle changes. flu vaccine given, L deltoid. RTO prn

## 2024-10-02 NOTE — REASON FOR VISIT
[Initial Evaluation] : an initial evaluation [Thyroid nodule/ MNG] : thyroid nodule/ MNG [FreeTextEntry2] : Dr Blas

## 2024-10-02 NOTE — HISTORY OF PRESENT ILLNESS
[de-identified] : Doing home exercises.  Doing PT.  Did RFA with Justyna. Feels back is improved. No radicular symptoms.

## 2024-10-02 NOTE — PHYSICAL EXAM
[Alert] : alert [No Acute Distress] : no acute distress [No Proptosis] : no proptosis [No Lid Lag] : no lid lag [Normal Hearing] : hearing was normal [No LAD] : no lymphadenopathy [Clear to Auscultation] : lungs were clear to auscultation bilaterally [Normal S1, S2] : normal S1 and S2 [Regular Rhythm] : with a regular rhythm [Normal Affect] : the affect was normal [Normal Mood] : the mood was normal [de-identified] : using walker [de-identified] : thyroid palpable, soft

## 2024-10-08 ENCOUNTER — APPOINTMENT (OUTPATIENT)
Dept: INTERNAL MEDICINE | Facility: CLINIC | Age: 54
End: 2024-10-08
Payer: MEDICARE

## 2024-10-08 VITALS
SYSTOLIC BLOOD PRESSURE: 152 MMHG | TEMPERATURE: 97.3 F | OXYGEN SATURATION: 100 % | DIASTOLIC BLOOD PRESSURE: 79 MMHG | HEART RATE: 59 BPM | BODY MASS INDEX: 30.64 KG/M2 | HEIGHT: 70 IN | WEIGHT: 214 LBS

## 2024-10-08 DIAGNOSIS — K08.89 OTHER SPECIFIED DISORDERS OF TEETH AND SUPPORTING STRUCTURES: ICD-10-CM

## 2024-10-08 DIAGNOSIS — E78.5 HYPERLIPIDEMIA, UNSPECIFIED: ICD-10-CM

## 2024-10-08 DIAGNOSIS — M54.9 DORSALGIA, UNSPECIFIED: ICD-10-CM

## 2024-10-08 PROCEDURE — G2211 COMPLEX E/M VISIT ADD ON: CPT

## 2024-10-08 PROCEDURE — 99215 OFFICE O/P EST HI 40 MIN: CPT

## 2024-10-10 ENCOUNTER — APPOINTMENT (OUTPATIENT)
Dept: ORTHOPEDIC SURGERY | Facility: CLINIC | Age: 54
End: 2024-10-10

## 2024-10-10 DIAGNOSIS — S93.492A SPRAIN OF OTHER LIGAMENT OF LEFT ANKLE, INITIAL ENCOUNTER: ICD-10-CM

## 2024-10-10 PROCEDURE — 20605 DRAIN/INJ JOINT/BURSA W/O US: CPT | Mod: LT

## 2024-10-10 PROCEDURE — 99213 OFFICE O/P EST LOW 20 MIN: CPT | Mod: 25

## 2024-10-18 ENCOUNTER — NON-APPOINTMENT (OUTPATIENT)
Age: 54
End: 2024-10-18

## 2024-10-25 ENCOUNTER — APPOINTMENT (OUTPATIENT)
Dept: INTERNAL MEDICINE | Facility: CLINIC | Age: 54
End: 2024-10-25
Payer: MEDICARE

## 2024-10-25 VITALS
HEIGHT: 70 IN | BODY MASS INDEX: 30.35 KG/M2 | OXYGEN SATURATION: 100 % | WEIGHT: 212 LBS | HEART RATE: 66 BPM | TEMPERATURE: 97.2 F | DIASTOLIC BLOOD PRESSURE: 84 MMHG | SYSTOLIC BLOOD PRESSURE: 127 MMHG

## 2024-10-25 DIAGNOSIS — E78.5 HYPERLIPIDEMIA, UNSPECIFIED: ICD-10-CM

## 2024-10-25 DIAGNOSIS — R05.9 COUGH, UNSPECIFIED: ICD-10-CM

## 2024-10-25 PROCEDURE — G2211 COMPLEX E/M VISIT ADD ON: CPT

## 2024-10-25 PROCEDURE — 99214 OFFICE O/P EST MOD 30 MIN: CPT

## 2024-11-29 ENCOUNTER — NON-APPOINTMENT (OUTPATIENT)
Age: 54
End: 2024-11-29

## 2024-12-02 ENCOUNTER — APPOINTMENT (OUTPATIENT)
Dept: ORTHOPEDIC SURGERY | Facility: CLINIC | Age: 54
End: 2024-12-02
Payer: MEDICARE

## 2024-12-02 DIAGNOSIS — M54.16 RADICULOPATHY, LUMBAR REGION: ICD-10-CM

## 2024-12-02 DIAGNOSIS — M51.369: ICD-10-CM

## 2024-12-02 PROCEDURE — 99214 OFFICE O/P EST MOD 30 MIN: CPT

## 2024-12-05 ENCOUNTER — APPOINTMENT (OUTPATIENT)
Dept: ORTHOPEDIC SURGERY | Facility: CLINIC | Age: 54
End: 2024-12-05
Payer: MEDICARE

## 2024-12-05 DIAGNOSIS — M19.072 PRIMARY OSTEOARTHRITIS, LEFT ANKLE AND FOOT: ICD-10-CM

## 2024-12-05 DIAGNOSIS — S93.492A SPRAIN OF OTHER LIGAMENT OF LEFT ANKLE, INITIAL ENCOUNTER: ICD-10-CM

## 2024-12-05 PROCEDURE — 99213 OFFICE O/P EST LOW 20 MIN: CPT

## 2024-12-16 ENCOUNTER — APPOINTMENT (OUTPATIENT)
Dept: ORTHOPEDIC SURGERY | Facility: CLINIC | Age: 54
End: 2024-12-16
Payer: MEDICARE

## 2024-12-16 DIAGNOSIS — M17.0 BILATERAL PRIMARY OSTEOARTHRITIS OF KNEE: ICD-10-CM

## 2024-12-16 DIAGNOSIS — M23.301 OTHER MENISCUS DERANGEMENTS, UNSPECIFIED LATERAL MENISCUS, LEFT KNEE: ICD-10-CM

## 2024-12-16 PROCEDURE — 99213 OFFICE O/P EST LOW 20 MIN: CPT | Mod: 25

## 2024-12-16 PROCEDURE — 20610 DRAIN/INJ JOINT/BURSA W/O US: CPT | Mod: LT

## 2025-01-02 ENCOUNTER — APPOINTMENT (OUTPATIENT)
Dept: NEUROSURGERY | Facility: CLINIC | Age: 55
End: 2025-01-02

## 2025-01-13 ENCOUNTER — APPOINTMENT (OUTPATIENT)
Dept: ORTHOPEDIC SURGERY | Facility: CLINIC | Age: 55
End: 2025-01-13
Payer: MEDICARE

## 2025-01-13 DIAGNOSIS — M54.16 RADICULOPATHY, LUMBAR REGION: ICD-10-CM

## 2025-01-13 DIAGNOSIS — M51.369: ICD-10-CM

## 2025-01-13 PROCEDURE — 99213 OFFICE O/P EST LOW 20 MIN: CPT

## 2025-01-22 ENCOUNTER — APPOINTMENT (OUTPATIENT)
Dept: ORTHOPEDIC SURGERY | Facility: CLINIC | Age: 55
End: 2025-01-22

## 2025-01-22 NOTE — ED PROVIDER NOTE - CONSTITUTIONAL NEGATIVE STATEMENT, MLM
I reviewed the lipid panel from 3/6/24: , , TG 74, .   LDL is above goal of <100   no fever and no chills.

## 2025-02-03 ENCOUNTER — APPOINTMENT (OUTPATIENT)
Dept: DERMATOLOGY | Facility: CLINIC | Age: 55
End: 2025-02-03
Payer: MEDICARE

## 2025-02-03 ENCOUNTER — NON-APPOINTMENT (OUTPATIENT)
Age: 55
End: 2025-02-03

## 2025-02-03 ENCOUNTER — APPOINTMENT (OUTPATIENT)
Dept: ORTHOPEDIC SURGERY | Facility: CLINIC | Age: 55
End: 2025-02-03
Payer: MEDICARE

## 2025-02-03 VITALS — HEIGHT: 70 IN | WEIGHT: 212 LBS | BODY MASS INDEX: 30.35 KG/M2

## 2025-02-03 DIAGNOSIS — R23.8 OTHER SKIN CHANGES: ICD-10-CM

## 2025-02-03 DIAGNOSIS — D48.5 NEOPLASM OF UNCERTAIN BEHAVIOR OF SKIN: ICD-10-CM

## 2025-02-03 PROCEDURE — 99202 OFFICE O/P NEW SF 15 MIN: CPT | Mod: 25

## 2025-02-03 PROCEDURE — 11104 PUNCH BX SKIN SINGLE LESION: CPT

## 2025-02-03 PROCEDURE — 20610 DRAIN/INJ JOINT/BURSA W/O US: CPT | Mod: 50

## 2025-02-03 PROCEDURE — 73564 X-RAY EXAM KNEE 4 OR MORE: CPT | Mod: 50

## 2025-02-03 RX ORDER — OMALIZUMAB 300 MG/2ML
300 INJECTION, SOLUTION SUBCUTANEOUS
Refills: 0 | Status: ACTIVE | COMMUNITY

## 2025-02-06 PROBLEM — D48.5 NEOPLASM OF UNCERTAIN BEHAVIOR OF SKIN: Status: ACTIVE | Noted: 2025-02-03

## 2025-02-06 PROBLEM — R23.8 PAPULE OF SKIN: Status: ACTIVE | Noted: 2025-02-06

## 2025-02-11 LAB — DERMATOLOGY BIOPSY: NORMAL

## 2025-02-21 ENCOUNTER — APPOINTMENT (OUTPATIENT)
Dept: DERMATOLOGY | Facility: CLINIC | Age: 55
End: 2025-02-21
Payer: MEDICARE

## 2025-02-21 DIAGNOSIS — L73.9 FOLLICULAR DISORDER, UNSPECIFIED: ICD-10-CM

## 2025-02-21 DIAGNOSIS — L81.0 POSTINFLAMMATORY HYPERPIGMENTATION: ICD-10-CM

## 2025-02-21 DIAGNOSIS — R23.8 OTHER SKIN CHANGES: ICD-10-CM

## 2025-02-21 PROCEDURE — 99214 OFFICE O/P EST MOD 30 MIN: CPT

## 2025-02-22 PROBLEM — L81.0 POST-INFLAMMATORY HYPERPIGMENTATION: Status: ACTIVE | Noted: 2025-02-22

## 2025-02-22 PROBLEM — L73.9 FOLLICULITIS: Status: ACTIVE | Noted: 2025-02-22

## 2025-02-22 RX ORDER — CLINDAMYCIN PHOSPHATE 10 MG/ML
1 LOTION TOPICAL
Qty: 1 | Refills: 5 | Status: ACTIVE | COMMUNITY
Start: 2025-02-22 | End: 1900-01-01

## 2025-03-03 DIAGNOSIS — E04.2 NONTOXIC MULTINODULAR GOITER: ICD-10-CM

## 2025-03-03 DIAGNOSIS — E04.1 NONTOXIC SINGLE THYROID NODULE: ICD-10-CM

## 2025-03-06 NOTE — ED PROVIDER NOTE - ATTESTATION, MLM
numerical 0-10
I have reviewed and confirmed nurses' notes for patient's medications, allergies, medical history, and surgical history.

## 2025-03-14 ENCOUNTER — APPOINTMENT (OUTPATIENT)
Dept: ORTHOPEDIC SURGERY | Facility: CLINIC | Age: 55
End: 2025-03-14
Payer: MEDICARE

## 2025-03-14 DIAGNOSIS — S93.492A SPRAIN OF OTHER LIGAMENT OF LEFT ANKLE, INITIAL ENCOUNTER: ICD-10-CM

## 2025-03-14 PROCEDURE — 99213 OFFICE O/P EST LOW 20 MIN: CPT

## 2025-03-20 ENCOUNTER — APPOINTMENT (OUTPATIENT)
Dept: INTERNAL MEDICINE | Facility: CLINIC | Age: 55
End: 2025-03-20
Payer: MEDICARE

## 2025-03-20 ENCOUNTER — NON-APPOINTMENT (OUTPATIENT)
Age: 55
End: 2025-03-20

## 2025-03-20 VITALS
HEIGHT: 70 IN | SYSTOLIC BLOOD PRESSURE: 118 MMHG | OXYGEN SATURATION: 99 % | DIASTOLIC BLOOD PRESSURE: 69 MMHG | BODY MASS INDEX: 30.06 KG/M2 | HEART RATE: 91 BPM | TEMPERATURE: 97.3 F | WEIGHT: 210 LBS

## 2025-03-20 DIAGNOSIS — R91.1 SOLITARY PULMONARY NODULE: ICD-10-CM

## 2025-03-20 DIAGNOSIS — E04.1 NONTOXIC SINGLE THYROID NODULE: ICD-10-CM

## 2025-03-20 DIAGNOSIS — M54.9 DORSALGIA, UNSPECIFIED: ICD-10-CM

## 2025-03-20 DIAGNOSIS — M89.9 DISORDER OF BONE, UNSPECIFIED: ICD-10-CM

## 2025-03-20 DIAGNOSIS — Z01.818 ENCOUNTER FOR OTHER PREPROCEDURAL EXAMINATION: ICD-10-CM

## 2025-03-20 PROCEDURE — G2212 PROLONG OUTPT/OFFICE VIS: CPT

## 2025-03-20 PROCEDURE — 93000 ELECTROCARDIOGRAM COMPLETE: CPT

## 2025-03-20 PROCEDURE — 36415 COLL VENOUS BLD VENIPUNCTURE: CPT

## 2025-03-20 PROCEDURE — G2211 COMPLEX E/M VISIT ADD ON: CPT

## 2025-03-20 PROCEDURE — 99215 OFFICE O/P EST HI 40 MIN: CPT

## 2025-03-21 LAB
ALBUMIN SERPL ELPH-MCNC: 4.3 G/DL
ALP BLD-CCNC: 95 U/L
ALT SERPL-CCNC: 11 U/L
ANION GAP SERPL CALC-SCNC: 14 MMOL/L
APPEARANCE: CLEAR
APTT BLD: 33.4 SEC
AST SERPL-CCNC: 14 U/L
BASOPHILS # BLD AUTO: 0.03 K/UL
BASOPHILS NFR BLD AUTO: 0.5 %
BILIRUB SERPL-MCNC: 0.4 MG/DL
BILIRUBIN URINE: NEGATIVE
BLOOD URINE: NEGATIVE
BUN SERPL-MCNC: 11 MG/DL
CALCIUM SERPL-MCNC: 9.3 MG/DL
CHLORIDE SERPL-SCNC: 100 MMOL/L
CO2 SERPL-SCNC: 28 MMOL/L
COLOR: YELLOW
CREAT SERPL-MCNC: 0.65 MG/DL
EGFRCR SERPLBLD CKD-EPI 2021: 103 ML/MIN/1.73M2
EOSINOPHIL # BLD AUTO: 0.06 K/UL
EOSINOPHIL NFR BLD AUTO: 0.9 %
GLUCOSE QUALITATIVE U: NEGATIVE MG/DL
GLUCOSE SERPL-MCNC: 90 MG/DL
HCT VFR BLD CALC: 40.1 %
HGB BLD-MCNC: 12.6 G/DL
IMM GRANULOCYTES NFR BLD AUTO: 0.3 %
INR PPP: 1.03 RATIO
KETONES URINE: NEGATIVE MG/DL
LEUKOCYTE ESTERASE URINE: NEGATIVE
LYMPHOCYTES # BLD AUTO: 2.21 K/UL
LYMPHOCYTES NFR BLD AUTO: 35 %
MAN DIFF?: NORMAL
MCHC RBC-ENTMCNC: 26.5 PG
MCHC RBC-ENTMCNC: 31.4 G/DL
MCV RBC AUTO: 84.4 FL
MONOCYTES # BLD AUTO: 0.57 K/UL
MONOCYTES NFR BLD AUTO: 9 %
NEUTROPHILS # BLD AUTO: 3.43 K/UL
NEUTROPHILS NFR BLD AUTO: 54.3 %
NITRITE URINE: NEGATIVE
PH URINE: 6
PLATELET # BLD AUTO: 300 K/UL
POTASSIUM SERPL-SCNC: 3.6 MMOL/L
PROT SERPL-MCNC: 7.4 G/DL
PROTEIN URINE: NEGATIVE MG/DL
PT BLD: 12.1 SEC
RBC # BLD: 4.75 M/UL
RBC # FLD: 13.7 %
SODIUM SERPL-SCNC: 141 MMOL/L
SPECIFIC GRAVITY URINE: 1.02
UROBILINOGEN URINE: 1 MG/DL
WBC # FLD AUTO: 6.32 K/UL

## 2025-04-01 ENCOUNTER — APPOINTMENT (OUTPATIENT)
Dept: INTERNAL MEDICINE | Facility: CLINIC | Age: 55
End: 2025-04-01

## 2025-04-15 ENCOUNTER — APPOINTMENT (OUTPATIENT)
Dept: ORTHOPEDIC SURGERY | Facility: CLINIC | Age: 55
End: 2025-04-15
Payer: MEDICARE

## 2025-04-15 DIAGNOSIS — M54.16 RADICULOPATHY, LUMBAR REGION: ICD-10-CM

## 2025-04-15 DIAGNOSIS — M51.9 UNSPECIFIED THORACIC, THORACOLUMBAR AND LUMBOSACRAL INTERVERTEBRAL DISC DISORDER: ICD-10-CM

## 2025-04-15 PROCEDURE — 99213 OFFICE O/P EST LOW 20 MIN: CPT

## 2025-04-30 ENCOUNTER — OUTPATIENT (OUTPATIENT)
Dept: OUTPATIENT SERVICES | Facility: HOSPITAL | Age: 55
LOS: 1 days | End: 2025-04-30

## 2025-04-30 ENCOUNTER — APPOINTMENT (OUTPATIENT)
Dept: CT IMAGING | Facility: CLINIC | Age: 55
End: 2025-04-30
Payer: MEDICARE

## 2025-04-30 ENCOUNTER — APPOINTMENT (OUTPATIENT)
Dept: ULTRASOUND IMAGING | Facility: CLINIC | Age: 55
End: 2025-04-30
Payer: MEDICARE

## 2025-04-30 DIAGNOSIS — Z98.89 OTHER SPECIFIED POSTPROCEDURAL STATES: Chronic | ICD-10-CM

## 2025-04-30 PROCEDURE — 70491 CT SOFT TISSUE NECK W/DYE: CPT | Mod: 26

## 2025-04-30 PROCEDURE — 76536 US EXAM OF HEAD AND NECK: CPT | Mod: 26

## 2025-04-30 PROCEDURE — 71250 CT THORAX DX C-: CPT | Mod: 26

## 2025-05-12 ENCOUNTER — APPOINTMENT (OUTPATIENT)
Dept: ORTHOPEDIC SURGERY | Facility: CLINIC | Age: 55
End: 2025-05-12
Payer: MEDICARE

## 2025-05-12 DIAGNOSIS — S93.492A SPRAIN OF OTHER LIGAMENT OF LEFT ANKLE, INITIAL ENCOUNTER: ICD-10-CM

## 2025-05-12 PROCEDURE — 99213 OFFICE O/P EST LOW 20 MIN: CPT

## 2025-06-08 ENCOUNTER — EMERGENCY (EMERGENCY)
Facility: HOSPITAL | Age: 55
LOS: 1 days | End: 2025-06-08
Attending: EMERGENCY MEDICINE | Admitting: EMERGENCY MEDICINE
Payer: MEDICARE

## 2025-06-08 VITALS
RESPIRATION RATE: 18 BRPM | HEART RATE: 64 BPM | OXYGEN SATURATION: 100 % | SYSTOLIC BLOOD PRESSURE: 131 MMHG | DIASTOLIC BLOOD PRESSURE: 77 MMHG | TEMPERATURE: 99 F

## 2025-06-08 VITALS
HEIGHT: 70 IN | TEMPERATURE: 98 F | RESPIRATION RATE: 18 BRPM | WEIGHT: 210.1 LBS | OXYGEN SATURATION: 100 % | SYSTOLIC BLOOD PRESSURE: 118 MMHG | HEART RATE: 80 BPM | DIASTOLIC BLOOD PRESSURE: 76 MMHG

## 2025-06-08 DIAGNOSIS — Z88.1 ALLERGY STATUS TO OTHER ANTIBIOTIC AGENTS: ICD-10-CM

## 2025-06-08 DIAGNOSIS — M54.50 LOW BACK PAIN, UNSPECIFIED: ICD-10-CM

## 2025-06-08 DIAGNOSIS — Z88.8 ALLERGY STATUS TO OTHER DRUGS, MEDICAMENTS AND BIOLOGICAL SUBSTANCES: ICD-10-CM

## 2025-06-08 DIAGNOSIS — Z88.5 ALLERGY STATUS TO NARCOTIC AGENT: ICD-10-CM

## 2025-06-08 DIAGNOSIS — Z98.89 OTHER SPECIFIED POSTPROCEDURAL STATES: Chronic | ICD-10-CM

## 2025-06-08 DIAGNOSIS — Z96.82 PRESENCE OF NEUROSTIMULATOR: ICD-10-CM

## 2025-06-08 DIAGNOSIS — M48.9 SPONDYLOPATHY, UNSPECIFIED: ICD-10-CM

## 2025-06-08 DIAGNOSIS — M79.601 PAIN IN RIGHT ARM: ICD-10-CM

## 2025-06-08 PROCEDURE — 93971 EXTREMITY STUDY: CPT | Mod: 26,RT

## 2025-06-08 PROCEDURE — 99284 EMERGENCY DEPT VISIT MOD MDM: CPT | Mod: 25

## 2025-06-08 PROCEDURE — 72100 X-RAY EXAM L-S SPINE 2/3 VWS: CPT

## 2025-06-08 PROCEDURE — 93971 EXTREMITY STUDY: CPT

## 2025-06-08 PROCEDURE — 72100 X-RAY EXAM L-S SPINE 2/3 VWS: CPT | Mod: 26

## 2025-06-08 PROCEDURE — 99284 EMERGENCY DEPT VISIT MOD MDM: CPT

## 2025-06-08 RX ORDER — ACETAMINOPHEN 500 MG/5ML
975 LIQUID (ML) ORAL ONCE
Refills: 0 | Status: COMPLETED | OUTPATIENT
Start: 2025-06-08 | End: 2025-06-08

## 2025-06-08 RX ADMIN — Medication 975 MILLIGRAM(S): at 15:22

## 2025-06-08 NOTE — ED ADULT NURSE NOTE - NSFALLUNIVINTERV_ED_ALL_ED
Bed/Stretcher in lowest position, wheels locked, appropriate side rails in place/Call bell, personal items and telephone in reach/Instruct patient to call for assistance before getting out of bed/chair/stretcher/Non-slip footwear applied when patient is off stretcher/[place] to call system/Physically safe environment - no spills, clutter or unnecessary equipment/Purposeful proactive rounding/Room/bathroom lighting operational, light cord in reach

## 2025-06-08 NOTE — ED ADULT TRIAGE NOTE - HEIGHT IN FEET
[No Acute Distress] : no acute distress [Normal Oropharynx] : normal oropharynx [Normal Appearance] : normal appearance [No Neck Mass] : no neck mass [Normal Rate/Rhythm] : normal rate/rhythm [Normal S1, S2] : normal s1, s2 [No Murmurs] : no murmurs [No Resp Distress] : no resp distress [Clear to Auscultation Bilaterally] : clear to auscultation bilaterally [No Abnormalities] : no abnormalities [Benign] : benign [Normal Gait] : normal gait [No Clubbing] : no clubbing [No Cyanosis] : no cyanosis [No Edema] : no edema [FROM] : FROM [Normal Color/ Pigmentation] : normal color/ pigmentation [No Focal Deficits] : no focal deficits [Oriented x3] : oriented x3 [Normal Affect] : normal affect 5

## 2025-06-08 NOTE — ED ADULT TRIAGE NOTE - CHIEF COMPLAINT QUOTE
"I am having some issues with my back stimulator that was put in, in march and I am also having pain to my right arm and I want to make sure I do not have a blood clot". Patient has other allergies that she is not clear on and requested for pharmacy to see her to help

## 2025-06-08 NOTE — ED ADULT NURSE NOTE - OBJECTIVE STATEMENT
55yoF came to ED c/o R arm pain x1 day. Pt states " Its a sharp stabbing pain that radiates up my arm to my neck. I am nervous because my identical twin  from a blood clot". Pt denies warmth to arm, discoloration, injury/ trauma to area. chest pain, SOB, numbness/tingling, long travel days. Pt takes aspirin daily. Pt also c/o " different back pain". Pt recently had a pain pump surgically inserted in her back in March. recently said the pain " feels different than her usual back pain". Pt has been following up with her surgeon. Incision site clean dry and intact. Pt ambulates with walker and steady gait. Takes ASA

## 2025-06-08 NOTE — ED PROVIDER NOTE - NSDCPRINTRESULTS_ED_ALL_ED
Patient requests all Lab, Cardiology, and Radiology Results on their Discharge Instructions Skin Substitute Text: The defect edges were debeveled with a #15 scalpel blade.  Given the location of the defect, shape of the defect and the proximity to free margins a skin substitute graft was deemed most appropriate.  The graft material was trimmed to fit the size of the defect. The graft was then placed in the primary defect and oriented appropriately.

## 2025-06-08 NOTE — ED PROVIDER NOTE - NSFOLLOWUPINSTRUCTIONS_ED_ALL_ED_FT
YOUR SONOGRAM DOES NOT SHOW A BLOOD CLOT    YOUR XRAY SHOWS THAT THE STIMULATOR IS IN POSITION     THE ARM PAIN MAY BE COMING FROM THE PROBLEM WITH YOUR NECK    FOR NOW:  TAKE YOUR USUAL PAIN MEDIATIONS  YOU CAN TRY APPLYING AN ICE PACK TO THE NECK AND BACK IN THE AREAS OF PAIN FOR 20 MINUTES 4 TIMES A DAY.  YOU CAN ALTERNATE WITH A HEATING PAD TO SEE IF THAT HELPS.   DO NOT SLEEP WITH EITHER AN ICE PACK OR HEATING PAD.    VERY IMPORTANT:  CALL YOUR PAIN MANAGEMENT DOCTOR IN THE MORNING   TELL THE STAFF ABOUT YOUR ONGOING SYMPTOMS AND ER VISIT  THEY SHOULD SEE YOU THIS WEEK    RETURN TO THE EMERGENCY ROOM FOR:  FEVER  TROUBLE BREATHING  WORSENING PAIN  NEW NUMBNESS OR WEAKNESS IN THE ARMS OR LEGS

## 2025-06-08 NOTE — ED PROVIDER NOTE - PATIENT PORTAL LINK FT
You can access the FollowMyHealth Patient Portal offered by [facility] by registering at the following website: [email or web address]. By joining [facility]’s FollowMyHealth portal, you will also be able to view your health information using other applications (apps) compatible with our system.

## 2025-06-08 NOTE — ED PROVIDER NOTE - CLINICAL SUMMARY MEDICAL DECISION MAKING FREE TEXT BOX
56 yo F PMH noted on chart below with two primary issues:  1.  Ongoing pain in the area of a spine stimulator placed earlier this year.  Pain is in the area of the right lower back, constant, worse with certain movements.  Has spoken to her pain management team but has not yet been for an appointment to address the issue.  The pain has been for 2-3 wks.  No change in pain today, no new areas of pain, numbness or weakness.    2.  Right arm pain for the past week.  Pain radiates from the neck. Patient has a history of cervical spine disease and thinks it could be related but is concerned about a clot.  No ha, numbness, weakness, cp, sob.    Lower back pain may be part of her chronic pain vs issue with stimulator.  As it has been present for several weeks without acute changes, will check xray for position of device.  If there is no issue will refer to pain mgmt tomorrow for further evluation.  Exam of arm/neck suggests MSK origin of pain, but will assess for DVT with sono.    patient remained well throughout visit.  We discussed results, dx, treatment and f/u plan as well as return precautions.  Patient verbalized understanding of the discussion and plan.

## 2025-06-08 NOTE — ED PROVIDER NOTE - PHYSICAL EXAMINATION
General:  Well appearing, no distress  HEENT:  No conjunctival injection, neck supple, no   Spine:  Tenderness to the trap on the right which reproduces patient's pain.  No swelling, no midline tenderness to cervical/thoraci/lumbar spine.  There is tenderness to the right lower back in the area of the implant but there is no surrounding swelling, erythema, no underlying induration or fluctuance.   Chest:  Non-tender, no crepitance  Lungs:  Clear to auscultation bilaterally   Heart:  s1s2 normal, no murmur  Abdomen:  soft, non-tender, non-distended  :  Deferred  Rectal:  Deferred  Extremities: No edema, normal perfusion, patient with ongoing knee pain but there are no effusions or overlying erythema

## 2025-06-08 NOTE — ED PROVIDER NOTE - OBJECTIVE STATEMENT
54 yo F with two primary issues: 56 yo F PMH noted on chart below with two primary issues:  1.  Ongoing pain in the area of a spine stimulator placed earlier this year.  Pain is in the area of the right lower back, constant, worse with certain movements.  Has spoken to her pain management team but has not yet been for an appointment to address the issue.  The pain has been for 2-3 wks.  No change in pain today, no new areas of pain, numbness or weakness.    2.  Right arm pain for the past week.  Pain radiates from the neck. Patient has a history of cervical spine disease and thinks it could be related but is concerned about a clot.  No ha, numbness, weakness, cp, sob.

## 2025-06-20 ENCOUNTER — APPOINTMENT (OUTPATIENT)
Dept: DERMATOLOGY | Facility: CLINIC | Age: 55
End: 2025-06-20
Payer: MEDICARE

## 2025-06-20 PROBLEM — L70.0 ACNE VULGARIS: Status: ACTIVE | Noted: 2025-06-20

## 2025-06-20 PROCEDURE — 99214 OFFICE O/P EST MOD 30 MIN: CPT

## 2025-06-20 RX ORDER — DAPSONE 75 MG/G
7.5 GEL TOPICAL
Qty: 1 | Refills: 11 | Status: ACTIVE | COMMUNITY
Start: 2025-06-20 | End: 1900-01-01

## 2025-06-20 RX ORDER — BENZOYL PEROXIDE 5 G/100G
5 LIQUID TOPICAL DAILY
Qty: 1 | Refills: 4 | Status: ACTIVE | COMMUNITY
Start: 2025-06-20 | End: 1900-01-01

## 2025-07-08 ENCOUNTER — APPOINTMENT (OUTPATIENT)
Dept: ORTHOPEDIC SURGERY | Facility: CLINIC | Age: 55
End: 2025-07-08
Payer: MEDICARE

## 2025-07-08 PROCEDURE — 99213 OFFICE O/P EST LOW 20 MIN: CPT

## 2025-09-18 ENCOUNTER — APPOINTMENT (OUTPATIENT)
Dept: ORTHOPEDIC SURGERY | Age: 55
End: 2025-09-18
Payer: MEDICARE

## 2025-09-18 DIAGNOSIS — S83.271D COMPLEX TEAR OF LATERAL MENISCUS, CURRENT INJURY, RIGHT KNEE, SUBSEQUENT ENCOUNTER: ICD-10-CM

## 2025-09-18 DIAGNOSIS — M17.0 BILATERAL PRIMARY OSTEOARTHRITIS OF KNEE: ICD-10-CM

## 2025-09-18 PROCEDURE — 20610 DRAIN/INJ JOINT/BURSA W/O US: CPT | Mod: 50

## 2025-09-18 PROCEDURE — 99213 OFFICE O/P EST LOW 20 MIN: CPT | Mod: 25
